# Patient Record
Sex: FEMALE | Race: WHITE | Employment: OTHER
[De-identification: names, ages, dates, MRNs, and addresses within clinical notes are randomized per-mention and may not be internally consistent; named-entity substitution may affect disease eponyms.]

---

## 2017-01-06 RX ORDER — MECLIZINE HCL 12.5 MG/1
TABLET ORAL
Qty: 270 TABLET | Refills: 1 | Status: SHIPPED | OUTPATIENT
Start: 2017-01-06 | End: 2017-10-12 | Stop reason: SDUPTHER

## 2017-01-11 DIAGNOSIS — K57.32 DIVERTICULITIS OF LARGE INTESTINE WITHOUT PERFORATION OR ABSCESS WITHOUT BLEEDING: Primary | ICD-10-CM

## 2017-01-11 DIAGNOSIS — K31.89 MASS OF DUODENUM: ICD-10-CM

## 2017-02-06 PROBLEM — R93.5 ABNORMAL CT OF THE ABDOMEN: Status: ACTIVE | Noted: 2017-02-06

## 2017-02-08 ENCOUNTER — TELEPHONE (OUTPATIENT)
Dept: FAMILY MEDICINE CLINIC | Facility: CLINIC | Age: 77
End: 2017-02-08

## 2017-02-08 DIAGNOSIS — Z12.39 BREAST CANCER SCREENING: ICD-10-CM

## 2017-02-08 DIAGNOSIS — Z92.89 HISTORY OF MAMMOGRAM: ICD-10-CM

## 2017-02-08 DIAGNOSIS — Z12.31 ENCOUNTER FOR SCREENING MAMMOGRAM FOR BREAST CANCER: Primary | ICD-10-CM

## 2017-02-10 ENCOUNTER — TELEPHONE (OUTPATIENT)
Dept: FAMILY MEDICINE CLINIC | Facility: CLINIC | Age: 77
End: 2017-02-10

## 2017-02-10 DIAGNOSIS — Z00.00 ROUTINE ADULT HEALTH MAINTENANCE: Primary | ICD-10-CM

## 2017-02-10 DIAGNOSIS — Z79.4 TYPE 2 DIABETES MELLITUS WITHOUT COMPLICATION, WITH LONG-TERM CURRENT USE OF INSULIN (HCC): ICD-10-CM

## 2017-02-10 DIAGNOSIS — E11.9 TYPE 2 DIABETES MELLITUS WITHOUT COMPLICATION, WITH LONG-TERM CURRENT USE OF INSULIN (HCC): ICD-10-CM

## 2017-02-10 DIAGNOSIS — R73.9 ELEVATED BLOOD SUGAR: ICD-10-CM

## 2017-02-16 ENCOUNTER — TELEPHONE (OUTPATIENT)
Dept: FAMILY MEDICINE CLINIC | Facility: CLINIC | Age: 77
End: 2017-02-16

## 2017-02-16 DIAGNOSIS — Z12.31 ENCOUNTER FOR SCREENING MAMMOGRAM FOR BREAST CANCER: Primary | ICD-10-CM

## 2017-02-20 ENCOUNTER — HOSPITAL ENCOUNTER (OUTPATIENT)
Dept: MAMMOGRAPHY | Age: 77
Discharge: HOME | End: 2017-02-20
Payer: MEDICARE

## 2017-02-20 DIAGNOSIS — Z12.31 ENCOUNTER FOR SCREENING MAMMOGRAM FOR BREAST CANCER: ICD-10-CM

## 2017-02-20 PROCEDURE — 7025F PT INFOSYS ALARM 4 NXT MAMMO: CPT | Performed by: RADIOLOGY

## 2017-02-20 PROCEDURE — G0202 SCR MAMMO BI INCL CAD: HCPCS | Performed by: RADIOLOGY

## 2017-02-20 PROCEDURE — G0202 SCR MAMMO BI INCL CAD: HCPCS

## 2017-03-10 ENCOUNTER — OFFICE VISIT (OUTPATIENT)
Dept: GASTROENTEROLOGY | Facility: CLINIC | Age: 77
End: 2017-03-10

## 2017-03-10 VITALS
OXYGEN SATURATION: 95 % | WEIGHT: 170 LBS | SYSTOLIC BLOOD PRESSURE: 143 MMHG | BODY MASS INDEX: 32.1 KG/M2 | HEART RATE: 93 BPM | DIASTOLIC BLOOD PRESSURE: 59 MMHG | HEIGHT: 61 IN | TEMPERATURE: 97.6 F | RESPIRATION RATE: 14 BRPM

## 2017-03-10 DIAGNOSIS — R93.5 ABNORMAL CT OF THE ABDOMEN: Primary | ICD-10-CM

## 2017-03-10 DIAGNOSIS — K57.92 DIVERTICULITIS OF INTESTINE WITHOUT PERFORATION OR ABSCESS WITHOUT BLEEDING, UNSPECIFIED PART OF INTESTINAL TRACT: ICD-10-CM

## 2017-03-10 DIAGNOSIS — K31.7 POLYP OF DUODENUM: ICD-10-CM

## 2017-03-10 PROCEDURE — 99204 OFFICE O/P NEW MOD 45 MIN: CPT | Performed by: INTERNAL MEDICINE

## 2017-03-10 ASSESSMENT — ENCOUNTER SYMPTOMS
RECTAL PAIN: 0
ALLERGIC/IMMUNOLOGIC NEGATIVE: 1
ANAL BLEEDING: 0
NAUSEA: 0
CONSTIPATION: 0
BLOOD IN STOOL: 0
VOMITING: 0
DIARRHEA: 0
BACK PAIN: 1
SHORTNESS OF BREATH: 1
ABDOMINAL PAIN: 0
EYES NEGATIVE: 1
ABDOMINAL DISTENTION: 0
GASTROINTESTINAL NEGATIVE: 1

## 2017-04-03 ENCOUNTER — HOSPITAL ENCOUNTER (OUTPATIENT)
Age: 77
Setting detail: SPECIMEN
Discharge: HOME | End: 2017-04-03
Payer: MEDICARE

## 2017-04-03 ENCOUNTER — HOSPITAL ENCOUNTER (OUTPATIENT)
Facility: CLINIC | Age: 77
Setting detail: OUTPATIENT SURGERY
Discharge: HOME | End: 2017-04-03
Attending: INTERNAL MEDICINE | Admitting: INTERNAL MEDICINE
Payer: MEDICARE

## 2017-04-03 ENCOUNTER — ANESTHESIA (OUTPATIENT)
Dept: OPERATING ROOM | Facility: CLINIC | Age: 77
End: 2017-04-03
Payer: MEDICARE

## 2017-04-03 ENCOUNTER — ANESTHESIA EVENT (OUTPATIENT)
Dept: OPERATING ROOM | Facility: CLINIC | Age: 77
End: 2017-04-03
Payer: MEDICARE

## 2017-04-03 VITALS
RESPIRATION RATE: 15 BRPM | TEMPERATURE: 97.7 F | WEIGHT: 165 LBS | OXYGEN SATURATION: 94 % | SYSTOLIC BLOOD PRESSURE: 155 MMHG | HEART RATE: 90 BPM | HEIGHT: 61 IN | BODY MASS INDEX: 31.15 KG/M2 | DIASTOLIC BLOOD PRESSURE: 86 MMHG

## 2017-04-03 VITALS
SYSTOLIC BLOOD PRESSURE: 148 MMHG | OXYGEN SATURATION: 97 % | DIASTOLIC BLOOD PRESSURE: 81 MMHG | RESPIRATION RATE: 19 BRPM

## 2017-04-03 LAB
GLUCOSE BLD-MCNC: 154 MG/DL (ref 65–105)
GLUCOSE BLD-MCNC: 156 MG/DL (ref 65–105)

## 2017-04-03 PROCEDURE — 3700000000 HC ANESTHESIA ATTENDED CARE: Performed by: INTERNAL MEDICINE

## 2017-04-03 PROCEDURE — 7100000001 HC PACU RECOVERY - ADDTL 15 MIN: Performed by: INTERNAL MEDICINE

## 2017-04-03 PROCEDURE — 7100000010 HC PHASE II RECOVERY - FIRST 15 MIN: Performed by: INTERNAL MEDICINE

## 2017-04-03 PROCEDURE — 2500000003 HC RX 250 WO HCPCS: Performed by: NURSE ANESTHETIST, CERTIFIED REGISTERED

## 2017-04-03 PROCEDURE — 7100000011 HC PHASE II RECOVERY - ADDTL 15 MIN: Performed by: INTERNAL MEDICINE

## 2017-04-03 PROCEDURE — 82947 ASSAY GLUCOSE BLOOD QUANT: CPT

## 2017-04-03 PROCEDURE — 2580000003 HC RX 258: Performed by: ANESTHESIOLOGY

## 2017-04-03 PROCEDURE — 6360000002 HC RX W HCPCS: Performed by: NURSE ANESTHETIST, CERTIFIED REGISTERED

## 2017-04-03 PROCEDURE — 3609013500 HC EGD REMOVAL TUMOR POLYP/OTHER LESION SNARE TECH: Performed by: INTERNAL MEDICINE

## 2017-04-03 PROCEDURE — 7100000000 HC PACU RECOVERY - FIRST 15 MIN: Performed by: INTERNAL MEDICINE

## 2017-04-03 RX ORDER — ONDANSETRON 2 MG/ML
4 INJECTION INTRAMUSCULAR; INTRAVENOUS
Status: DISCONTINUED | OUTPATIENT
Start: 2017-04-03 | End: 2017-04-03 | Stop reason: HOSPADM

## 2017-04-03 RX ORDER — SODIUM CHLORIDE, SODIUM LACTATE, POTASSIUM CHLORIDE, CALCIUM CHLORIDE 600; 310; 30; 20 MG/100ML; MG/100ML; MG/100ML; MG/100ML
INJECTION, SOLUTION INTRAVENOUS CONTINUOUS
Status: DISCONTINUED | OUTPATIENT
Start: 2017-04-03 | End: 2017-04-03 | Stop reason: HOSPADM

## 2017-04-03 RX ORDER — MIDAZOLAM HYDROCHLORIDE 1 MG/ML
1 INJECTION INTRAMUSCULAR; INTRAVENOUS EVERY 10 MIN PRN
Status: DISCONTINUED | OUTPATIENT
Start: 2017-04-03 | End: 2017-04-03 | Stop reason: HOSPADM

## 2017-04-03 RX ORDER — PROPOFOL 10 MG/ML
INJECTION, EMULSION INTRAVENOUS PRN
Status: DISCONTINUED | OUTPATIENT
Start: 2017-04-03 | End: 2017-04-03 | Stop reason: SDUPTHER

## 2017-04-03 RX ORDER — LIDOCAINE HYDROCHLORIDE 10 MG/ML
INJECTION, SOLUTION EPIDURAL; INFILTRATION; INTRACAUDAL; PERINEURAL PRN
Status: DISCONTINUED | OUTPATIENT
Start: 2017-04-03 | End: 2017-04-03 | Stop reason: SDUPTHER

## 2017-04-03 RX ORDER — MEPERIDINE HYDROCHLORIDE 50 MG/ML
12.5 INJECTION INTRAMUSCULAR; INTRAVENOUS; SUBCUTANEOUS EVERY 5 MIN PRN
Status: DISCONTINUED | OUTPATIENT
Start: 2017-04-03 | End: 2017-04-03 | Stop reason: HOSPADM

## 2017-04-03 RX ORDER — FENTANYL CITRATE 50 UG/ML
50 INJECTION, SOLUTION INTRAMUSCULAR; INTRAVENOUS EVERY 5 MIN PRN
Status: DISCONTINUED | OUTPATIENT
Start: 2017-04-03 | End: 2017-04-03 | Stop reason: HOSPADM

## 2017-04-03 RX ORDER — FENTANYL CITRATE 50 UG/ML
25 INJECTION, SOLUTION INTRAMUSCULAR; INTRAVENOUS EVERY 5 MIN PRN
Status: DISCONTINUED | OUTPATIENT
Start: 2017-04-03 | End: 2017-04-03 | Stop reason: HOSPADM

## 2017-04-03 RX ADMIN — PROPOFOL 10 MG: 10 INJECTION, EMULSION INTRAVENOUS at 09:28

## 2017-04-03 RX ADMIN — SODIUM CHLORIDE, POTASSIUM CHLORIDE, SODIUM LACTATE AND CALCIUM CHLORIDE: 600; 310; 30; 20 INJECTION, SOLUTION INTRAVENOUS at 08:45

## 2017-04-03 RX ADMIN — LIDOCAINE HYDROCHLORIDE 30 MG: 10 INJECTION, SOLUTION EPIDURAL; INFILTRATION; INTRACAUDAL; PERINEURAL at 09:24

## 2017-04-03 RX ADMIN — PROPOFOL 10 MG: 10 INJECTION, EMULSION INTRAVENOUS at 09:26

## 2017-04-03 RX ADMIN — PROPOFOL 10 MG: 10 INJECTION, EMULSION INTRAVENOUS at 09:27

## 2017-04-03 RX ADMIN — PROPOFOL 20 MG: 10 INJECTION, EMULSION INTRAVENOUS at 09:25

## 2017-04-03 RX ADMIN — PROPOFOL 100 MG: 10 INJECTION, EMULSION INTRAVENOUS at 09:24

## 2017-04-03 ASSESSMENT — PAIN - FUNCTIONAL ASSESSMENT: PAIN_FUNCTIONAL_ASSESSMENT: 0-10

## 2017-04-05 LAB — SURGICAL PATHOLOGY REPORT: NORMAL

## 2017-04-17 RX ORDER — LISINOPRIL 10 MG/1
TABLET ORAL
Qty: 90 TABLET | Refills: 3 | Status: SHIPPED | OUTPATIENT
Start: 2017-04-17 | End: 2018-03-23 | Stop reason: SDUPTHER

## 2017-04-17 RX ORDER — GLIMEPIRIDE 2 MG/1
TABLET ORAL
Qty: 90 TABLET | Refills: 3 | Status: SHIPPED | OUTPATIENT
Start: 2017-04-17 | End: 2018-03-23 | Stop reason: SDUPTHER

## 2017-04-17 RX ORDER — SYRINGE AND NEEDLE,INSULIN,1ML 30 GX5/16"
SYRINGE, EMPTY DISPOSABLE MISCELLANEOUS
Qty: 100 EACH | Refills: 1 | Status: SHIPPED | OUTPATIENT
Start: 2017-04-17 | End: 2017-07-10 | Stop reason: SDUPTHER

## 2017-04-18 PROBLEM — D13.2 DUODENAL ADENOMA: Status: ACTIVE | Noted: 2017-04-01

## 2017-05-01 ENCOUNTER — OFFICE VISIT (OUTPATIENT)
Dept: GASTROENTEROLOGY | Age: 77
End: 2017-05-01
Payer: MEDICARE

## 2017-05-01 VITALS
OXYGEN SATURATION: 96 % | TEMPERATURE: 98.1 F | BODY MASS INDEX: 32.85 KG/M2 | HEART RATE: 96 BPM | HEIGHT: 61 IN | SYSTOLIC BLOOD PRESSURE: 159 MMHG | WEIGHT: 174 LBS | DIASTOLIC BLOOD PRESSURE: 67 MMHG

## 2017-05-01 DIAGNOSIS — R93.5 ABNORMAL CT OF THE ABDOMEN: ICD-10-CM

## 2017-05-01 DIAGNOSIS — K31.89 DUODENAL MASS: Primary | ICD-10-CM

## 2017-05-01 PROCEDURE — 99214 OFFICE O/P EST MOD 30 MIN: CPT | Performed by: INTERNAL MEDICINE

## 2017-05-01 ASSESSMENT — ENCOUNTER SYMPTOMS
ABDOMINAL DISTENTION: 0
ABDOMINAL PAIN: 0
NAUSEA: 0
BLOOD IN STOOL: 0
DIARRHEA: 0
ANAL BLEEDING: 0
BACK PAIN: 1
GASTROINTESTINAL NEGATIVE: 1
ALLERGIC/IMMUNOLOGIC NEGATIVE: 1
RECTAL PAIN: 0
SHORTNESS OF BREATH: 1
CONSTIPATION: 0
EYES NEGATIVE: 1
VOMITING: 0

## 2017-05-03 ENCOUNTER — TELEPHONE (OUTPATIENT)
Dept: GASTROENTEROLOGY | Age: 77
End: 2017-05-03

## 2017-05-12 ENCOUNTER — CARE COORDINATION (OUTPATIENT)
Dept: CARE COORDINATION | Age: 77
End: 2017-05-12

## 2017-06-05 ENCOUNTER — TELEPHONE (OUTPATIENT)
Dept: FAMILY MEDICINE CLINIC | Age: 77
End: 2017-06-05

## 2017-06-05 DIAGNOSIS — Z79.4 TYPE 2 DIABETES MELLITUS WITH DIABETIC POLYNEUROPATHY, WITH LONG-TERM CURRENT USE OF INSULIN (HCC): ICD-10-CM

## 2017-06-05 DIAGNOSIS — E11.42 TYPE 2 DIABETES MELLITUS WITH DIABETIC POLYNEUROPATHY, WITH LONG-TERM CURRENT USE OF INSULIN (HCC): ICD-10-CM

## 2017-06-05 DIAGNOSIS — I10 ESSENTIAL HYPERTENSION: Primary | ICD-10-CM

## 2017-06-05 DIAGNOSIS — E78.5 DYSLIPIDEMIA: ICD-10-CM

## 2017-06-14 ENCOUNTER — HOSPITAL ENCOUNTER (OUTPATIENT)
Age: 77
Setting detail: SPECIMEN
Discharge: HOME | End: 2017-06-14
Payer: MEDICARE

## 2017-06-14 DIAGNOSIS — E11.42 TYPE 2 DIABETES MELLITUS WITH DIABETIC POLYNEUROPATHY, WITH LONG-TERM CURRENT USE OF INSULIN (HCC): ICD-10-CM

## 2017-06-14 DIAGNOSIS — I10 ESSENTIAL HYPERTENSION: ICD-10-CM

## 2017-06-14 DIAGNOSIS — Z79.4 TYPE 2 DIABETES MELLITUS WITH DIABETIC POLYNEUROPATHY, WITH LONG-TERM CURRENT USE OF INSULIN (HCC): ICD-10-CM

## 2017-06-14 DIAGNOSIS — E78.5 DYSLIPIDEMIA: ICD-10-CM

## 2017-06-14 LAB
ABSOLUTE EOS #: 0.2 K/UL (ref 0–0.4)
ABSOLUTE LYMPH #: 3.7 K/UL (ref 1–4.8)
ABSOLUTE MONO #: 0.9 K/UL (ref 0.1–1.2)
ALBUMIN SERPL-MCNC: 4.1 G/DL (ref 3.5–5.2)
ALBUMIN/GLOBULIN RATIO: 1.2 (ref 1–2.5)
ALP BLD-CCNC: 85 U/L (ref 35–104)
ALT SERPL-CCNC: 20 U/L (ref 5–33)
ANION GAP SERPL CALCULATED.3IONS-SCNC: 18 MMOL/L (ref 9–17)
AST SERPL-CCNC: 21 U/L
BASOPHILS # BLD: 0 %
BASOPHILS ABSOLUTE: 0 K/UL (ref 0–0.2)
BILIRUB SERPL-MCNC: 0.54 MG/DL (ref 0.3–1.2)
BUN BLDV-MCNC: 15 MG/DL (ref 8–23)
BUN/CREAT BLD: ABNORMAL (ref 9–20)
CALCIUM SERPL-MCNC: 9.6 MG/DL (ref 8.6–10.4)
CHLORIDE BLD-SCNC: 100 MMOL/L (ref 98–107)
CHOLESTEROL/HDL RATIO: 2.6
CHOLESTEROL: 208 MG/DL
CO2: 24 MMOL/L (ref 20–31)
CREAT SERPL-MCNC: 0.66 MG/DL (ref 0.5–0.9)
DIFFERENTIAL TYPE: NORMAL
EOSINOPHILS RELATIVE PERCENT: 2 %
ESTIMATED AVERAGE GLUCOSE: 220 MG/DL
GFR AFRICAN AMERICAN: >60 ML/MIN
GFR NON-AFRICAN AMERICAN: >60 ML/MIN
GFR SERPL CREATININE-BSD FRML MDRD: ABNORMAL ML/MIN/{1.73_M2}
GFR SERPL CREATININE-BSD FRML MDRD: ABNORMAL ML/MIN/{1.73_M2}
GLUCOSE BLD-MCNC: 108 MG/DL (ref 70–99)
HBA1C MFR BLD: 9.3 % (ref 4–6)
HCT VFR BLD CALC: 42.6 % (ref 36–46)
HDLC SERPL-MCNC: 81 MG/DL
HEMOGLOBIN: 13.7 G/DL (ref 12–16)
LDL CHOLESTEROL: 107 MG/DL (ref 0–130)
LYMPHOCYTES # BLD: 38 %
MCH RBC QN AUTO: 28.7 PG (ref 26–34)
MCHC RBC AUTO-ENTMCNC: 32.2 G/DL (ref 31–37)
MCV RBC AUTO: 89 FL (ref 80–100)
MONOCYTES # BLD: 9 %
PDW BLD-RTO: 15 % (ref 12.5–15.4)
PLATELET # BLD: 298 K/UL (ref 140–450)
PLATELET ESTIMATE: NORMAL
PMV BLD AUTO: 8.9 FL (ref 6–12)
POTASSIUM SERPL-SCNC: 4.4 MMOL/L (ref 3.7–5.3)
RBC # BLD: 4.79 M/UL (ref 4–5.2)
RBC # BLD: NORMAL 10*6/UL
SEG NEUTROPHILS: 51 %
SEGMENTED NEUTROPHILS ABSOLUTE COUNT: 5.1 K/UL (ref 1.8–7.7)
SODIUM BLD-SCNC: 142 MMOL/L (ref 135–144)
TOTAL PROTEIN: 7.5 G/DL (ref 6.4–8.3)
TRIGL SERPL-MCNC: 98 MG/DL
VLDLC SERPL CALC-MCNC: ABNORMAL MG/DL (ref 1–30)
WBC # BLD: 9.9 K/UL (ref 3.5–11)
WBC # BLD: NORMAL 10*3/UL

## 2017-06-16 ENCOUNTER — OFFICE VISIT (OUTPATIENT)
Dept: FAMILY MEDICINE CLINIC | Age: 77
End: 2017-06-16
Payer: MEDICARE

## 2017-06-16 VITALS
HEART RATE: 96 BPM | HEIGHT: 61 IN | WEIGHT: 170 LBS | BODY MASS INDEX: 32.1 KG/M2 | SYSTOLIC BLOOD PRESSURE: 120 MMHG | DIASTOLIC BLOOD PRESSURE: 64 MMHG

## 2017-06-16 DIAGNOSIS — D13.2 DUODENAL ADENOMA: Primary | ICD-10-CM

## 2017-06-16 DIAGNOSIS — E11.42 DIABETIC POLYNEUROPATHY ASSOCIATED WITH TYPE 2 DIABETES MELLITUS (HCC): ICD-10-CM

## 2017-06-16 DIAGNOSIS — E11.42 TYPE 2 DIABETES MELLITUS WITH DIABETIC POLYNEUROPATHY, WITH LONG-TERM CURRENT USE OF INSULIN (HCC): ICD-10-CM

## 2017-06-16 DIAGNOSIS — E78.5 DYSLIPIDEMIA: ICD-10-CM

## 2017-06-16 DIAGNOSIS — Z79.4 TYPE 2 DIABETES MELLITUS WITH DIABETIC POLYNEUROPATHY, WITH LONG-TERM CURRENT USE OF INSULIN (HCC): ICD-10-CM

## 2017-06-16 DIAGNOSIS — G89.29 CHRONIC BILATERAL LOW BACK PAIN WITHOUT SCIATICA: ICD-10-CM

## 2017-06-16 DIAGNOSIS — I10 ESSENTIAL HYPERTENSION: ICD-10-CM

## 2017-06-16 DIAGNOSIS — J44.9 CHRONIC OBSTRUCTIVE PULMONARY DISEASE, UNSPECIFIED COPD TYPE (HCC): ICD-10-CM

## 2017-06-16 DIAGNOSIS — L82.1 SK (SEBORRHEIC KERATOSIS): ICD-10-CM

## 2017-06-16 DIAGNOSIS — M54.50 CHRONIC BILATERAL LOW BACK PAIN WITHOUT SCIATICA: ICD-10-CM

## 2017-06-16 PROCEDURE — G8510 SCR DEP NEG, NO PLAN REQD: HCPCS | Performed by: FAMILY MEDICINE

## 2017-06-16 PROCEDURE — 3288F FALL RISK ASSESSMENT DOCD: CPT | Performed by: FAMILY MEDICINE

## 2017-06-16 PROCEDURE — 99213 OFFICE O/P EST LOW 20 MIN: CPT | Performed by: FAMILY MEDICINE

## 2017-06-16 RX ORDER — OXYCODONE HYDROCHLORIDE AND ACETAMINOPHEN 5; 325 MG/1; MG/1
1 TABLET ORAL EVERY 4 HOURS PRN
Qty: 100 TABLET | Refills: 0 | Status: SHIPPED | OUTPATIENT
Start: 2017-06-16 | End: 2017-06-23

## 2017-06-16 RX ORDER — GABAPENTIN 100 MG/1
100 CAPSULE ORAL 3 TIMES DAILY
Qty: 90 CAPSULE | Refills: 3 | Status: SHIPPED | OUTPATIENT
Start: 2017-06-16 | End: 2017-08-24

## 2017-06-16 ASSESSMENT — PATIENT HEALTH QUESTIONNAIRE - PHQ9
1. LITTLE INTEREST OR PLEASURE IN DOING THINGS: 0
SUM OF ALL RESPONSES TO PHQ QUESTIONS 1-9: 3
2. FEELING DOWN, DEPRESSED OR HOPELESS: 3
SUM OF ALL RESPONSES TO PHQ9 QUESTIONS 1 & 2: 3

## 2017-06-16 ASSESSMENT — ENCOUNTER SYMPTOMS
SORE THROAT: 0
NAUSEA: 0
SHORTNESS OF BREATH: 0
BACK PAIN: 1
VOMITING: 0
COUGH: 0
DIARRHEA: 0
SINUS PRESSURE: 0

## 2017-06-23 ENCOUNTER — TELEPHONE (OUTPATIENT)
Dept: FAMILY MEDICINE CLINIC | Age: 77
End: 2017-06-23

## 2017-06-23 RX ORDER — AZELASTINE 1 MG/ML
1 SPRAY, METERED NASAL 2 TIMES DAILY
Qty: 1 BOTTLE | Refills: 0 | Status: SHIPPED | OUTPATIENT
Start: 2017-06-23 | End: 2017-07-18 | Stop reason: ALTCHOICE

## 2017-06-23 RX ORDER — AMOXICILLIN AND CLAVULANATE POTASSIUM 875; 125 MG/1; MG/1
1 TABLET, FILM COATED ORAL 2 TIMES DAILY
Qty: 20 TABLET | Refills: 0 | Status: SHIPPED | OUTPATIENT
Start: 2017-06-23 | End: 2017-07-03

## 2017-07-11 RX ORDER — SYRINGE AND NEEDLE,INSULIN,1ML 30 GX5/16"
SYRINGE, EMPTY DISPOSABLE MISCELLANEOUS
Qty: 100 EACH | Refills: 0 | Status: SHIPPED | OUTPATIENT
Start: 2017-07-11 | End: 2017-10-12 | Stop reason: SDUPTHER

## 2017-07-13 ENCOUNTER — OFFICE VISIT (OUTPATIENT)
Dept: FAMILY MEDICINE CLINIC | Age: 77
End: 2017-07-13
Payer: MEDICARE

## 2017-07-13 VITALS — SYSTOLIC BLOOD PRESSURE: 120 MMHG | DIASTOLIC BLOOD PRESSURE: 72 MMHG | HEART RATE: 72 BPM

## 2017-07-13 DIAGNOSIS — M17.11 PRIMARY OSTEOARTHRITIS OF RIGHT KNEE: Primary | ICD-10-CM

## 2017-07-13 PROCEDURE — 99213 OFFICE O/P EST LOW 20 MIN: CPT

## 2017-07-13 RX ORDER — TRAMADOL HYDROCHLORIDE 50 MG/1
50 TABLET ORAL EVERY 6 HOURS PRN
Qty: 30 TABLET | Refills: 0 | Status: SHIPPED | OUTPATIENT
Start: 2017-07-13 | End: 2017-07-23

## 2017-07-14 DIAGNOSIS — M17.11 PRIMARY OSTEOARTHRITIS OF RIGHT KNEE: ICD-10-CM

## 2017-07-18 ENCOUNTER — OFFICE VISIT (OUTPATIENT)
Dept: FAMILY MEDICINE CLINIC | Age: 77
End: 2017-07-18
Payer: MEDICARE

## 2017-07-18 VITALS
WEIGHT: 171 LBS | HEART RATE: 88 BPM | DIASTOLIC BLOOD PRESSURE: 74 MMHG | BODY MASS INDEX: 32.31 KG/M2 | OXYGEN SATURATION: 89 % | SYSTOLIC BLOOD PRESSURE: 134 MMHG

## 2017-07-18 DIAGNOSIS — M17.11 PRIMARY OSTEOARTHRITIS OF RIGHT KNEE: Primary | ICD-10-CM

## 2017-07-18 PROCEDURE — 99213 OFFICE O/P EST LOW 20 MIN: CPT | Performed by: FAMILY MEDICINE

## 2017-07-18 RX ORDER — OXYCODONE HYDROCHLORIDE AND ACETAMINOPHEN 5; 325 MG/1; MG/1
1 TABLET ORAL EVERY 4 HOURS PRN
COMMUNITY
End: 2017-12-18 | Stop reason: ALTCHOICE

## 2017-07-18 ASSESSMENT — ENCOUNTER SYMPTOMS
BACK PAIN: 0
COUGH: 0
SINUS PRESSURE: 0
DIARRHEA: 0
SHORTNESS OF BREATH: 0
VOMITING: 0
NAUSEA: 0
SORE THROAT: 0

## 2017-08-22 ENCOUNTER — TELEPHONE (OUTPATIENT)
Dept: FAMILY MEDICINE CLINIC | Age: 77
End: 2017-08-22

## 2017-08-24 ENCOUNTER — OFFICE VISIT (OUTPATIENT)
Dept: FAMILY MEDICINE CLINIC | Age: 77
End: 2017-08-24
Payer: MEDICARE

## 2017-08-24 VITALS
WEIGHT: 171 LBS | DIASTOLIC BLOOD PRESSURE: 74 MMHG | HEART RATE: 94 BPM | BODY MASS INDEX: 32.31 KG/M2 | SYSTOLIC BLOOD PRESSURE: 130 MMHG

## 2017-08-24 DIAGNOSIS — M17.11 PRIMARY OSTEOARTHRITIS OF RIGHT KNEE: Primary | ICD-10-CM

## 2017-08-24 PROCEDURE — 96372 THER/PROPH/DIAG INJ SC/IM: CPT | Performed by: FAMILY MEDICINE

## 2017-08-24 PROCEDURE — 99213 OFFICE O/P EST LOW 20 MIN: CPT | Performed by: FAMILY MEDICINE

## 2017-08-24 RX ORDER — METHYLPREDNISOLONE ACETATE 80 MG/ML
80 INJECTION, SUSPENSION INTRA-ARTICULAR; INTRALESIONAL; INTRAMUSCULAR; SOFT TISSUE ONCE
Status: COMPLETED | OUTPATIENT
Start: 2017-08-24 | End: 2017-08-24

## 2017-08-24 RX ADMIN — METHYLPREDNISOLONE ACETATE 80 MG: 80 INJECTION, SUSPENSION INTRA-ARTICULAR; INTRALESIONAL; INTRAMUSCULAR; SOFT TISSUE at 14:22

## 2017-08-24 ASSESSMENT — ENCOUNTER SYMPTOMS
VOMITING: 0
BACK PAIN: 0
COUGH: 0
SHORTNESS OF BREATH: 0
NAUSEA: 0
SORE THROAT: 0
SINUS PRESSURE: 0
DIARRHEA: 0

## 2017-08-28 ENCOUNTER — TELEPHONE (OUTPATIENT)
Dept: FAMILY MEDICINE CLINIC | Age: 77
End: 2017-08-28

## 2017-08-28 DIAGNOSIS — M25.561 RIGHT KNEE PAIN, UNSPECIFIED CHRONICITY: Primary | ICD-10-CM

## 2017-09-01 ENCOUNTER — HOSPITAL ENCOUNTER (OUTPATIENT)
Dept: MRI IMAGING | Age: 77
Discharge: HOME | End: 2017-09-01
Payer: MEDICARE

## 2017-09-01 DIAGNOSIS — M25.561 RIGHT KNEE PAIN, UNSPECIFIED CHRONICITY: ICD-10-CM

## 2017-09-01 PROCEDURE — 73721 MRI JNT OF LWR EXTRE W/O DYE: CPT

## 2017-09-06 DIAGNOSIS — M25.561 RIGHT KNEE PAIN, UNSPECIFIED CHRONICITY: Primary | ICD-10-CM

## 2017-09-06 DIAGNOSIS — S83.8X1A MENISCAL INJURY, RIGHT, INITIAL ENCOUNTER: ICD-10-CM

## 2017-10-03 ENCOUNTER — OFFICE VISIT (OUTPATIENT)
Dept: ORTHOPEDIC SURGERY | Age: 77
End: 2017-10-03
Payer: MEDICARE

## 2017-10-03 VITALS — BODY MASS INDEX: 31.1 KG/M2 | WEIGHT: 169 LBS | HEIGHT: 62 IN

## 2017-10-03 DIAGNOSIS — M87.251: Primary | ICD-10-CM

## 2017-10-03 PROCEDURE — 99204 OFFICE O/P NEW MOD 45 MIN: CPT | Performed by: ORTHOPAEDIC SURGERY

## 2017-10-03 RX ORDER — HYDROCODONE BITARTRATE AND ACETAMINOPHEN 5; 325 MG/1; MG/1
1 TABLET ORAL EVERY 8 HOURS PRN
Qty: 50 TABLET | Refills: 0 | Status: SHIPPED | OUTPATIENT
Start: 2017-10-03 | End: 2017-12-18 | Stop reason: ALTCHOICE

## 2017-10-03 NOTE — MR AVS SNAPSHOT
your BMI, the greater your risk of heart disease, high blood pressure, type 2 diabetes, stroke, gallstones, arthritis, sleep apnea, and certain cancers. BMI is not perfect. It may overestimate body fat in athletes and people who are more muscular. Even a small weight loss (between 5 and 10 percent of your current weight) by decreasing your calorie intake and becoming more physically active will help lower your risk of developing or worsening diseases associated with obesity. Learn more at: [email or web address]             Today's Medication Changes          These changes are accurate as of: 10/3/17 12:00 PM.  If you have any questions, ask your nurse or doctor. START taking these medications           HYDROcodone-acetaminophen 5-325 MG per tablet   Commonly known as:  NORCO   Instructions: Take 1 tablet by mouth every 8 hours as needed for Pain . Quantity:  50 tablet   Refills:  0   Started by:  Franklin Bocanegra MD            Where to Get Your Medications      These medications were sent to [address] [place] [address] 16, [address] [facility] Syeda Drum [phone]  [facility]      Phone:  [phone]     HYDROcodone-acetaminophen 5-325 MG per tablet               Your Current Medications Are              HYDROcodone-acetaminophen (NORCO) 5-325 MG per tablet Take 1 tablet by mouth every 8 hours as needed for Pain . oxyCODONE-acetaminophen (PERCOCET) 5-325 MG per tablet Take 1 tablet by mouth every 4 hours as needed for Pain . MONOJECT INS SYR 1CC/30G 30G X 5/16\" 1 ML MISC USE  TO INJECT TWICE DAILY    insulin 70-30 (NOVOLIN 70/30) (70-30) 100 UNIT per ML injection vial 80 units in am before breakfast and 50 units before evening meal daily    lisinopril (PRINIVIL;ZESTRIL) 10 MG tablet TAKE 1 TABLET EVERY DAY    glimepiride (AMARYL) 2 MG tablet TAKE 1 TABLET EVERY MORNING (BEFORE BREAKFAST). meclizine (ANTIVERT) 12.5 MG tablet TAKE 1 TABLET THREE TIMES DAILY AS NEEDED    aspirin 325 MG tablet Take 325 mg by mouth daily. ezetimibe-simvastatin (VYTORIN) 10-20 MG per tablet Take 1 tablet by mouth nightly. naproxen sodium (ALEVE) 220 MG tablet Take 220 mg by mouth 2 times daily as needed. Allergies              Nubain [Nalbuphine Hcl]     Vistaril [Hydroxyzine Hcl] Nausea And Vomiting         Additional Information        Basic Information     Date Of Birth Sex Race Ethnicity Preferred Language    1940 Female White Non-/Non  English      Problem List as of 10/3/2017  Date Reviewed: 7/18/2017                Duodenal adenoma    Diverticulitis of intestine without perforation or abscess without bleeding    Abnormal CT of the abdomen    Type 2 diabetes mellitus with diabetic polyneuropathy (HCC)    COPD (chronic obstructive pulmonary disease) ([facility] [address]    Hypertension    Dyslipidemia    Osteoporosis    Osteoarthritis      Your Goals as of 10/3/2017 at 12:00 PM              6/14/17    10/4/16    3/28/16       Lifestyle    Affordable medications (pt-stated)              Result Component    HEMOGLOBIN A1C < 8.0   9.3  10.5  8.8      Immunizations as of 10/3/2017     Name Date    Influenza Virus Vaccine 10/27/2015, 10/22/2014, 10/22/2013, 10/15/2012    Influenza, Inoa Pettit, 3 Years and older, IM 10/5/2016    Pneumococcal 13-valent Conjugate (Veena Leonel) 10/27/2015      Preventive Care        Date Due    Tetanus Combination Vaccine (1 - Tdap) 4/21/1960    Zoster Vaccine 4/21/2001    Osteoporosis screening or a bone density scan (Dexa) is recommended once at age 72. Based upon the results and risk factors for bone loss, your provider will recommend whether this needs to be repeated.  4/21/2006    Pneumococcal Vaccines (two) for all adults aged 72 and over (2 of 2 - PPSV23) 10/27/2016    Yearly Flu Vaccine (1) 9/1/2017    Cholesterol Screening 6/14/2022            MyChart Signup Our records indicate that you have declined MyChart signup.

## 2017-10-03 NOTE — PROGRESS NOTES
This pleasant 52-year-old patient is seen here because of pain in the right knee which came on suddenly about 2-1/2 months ago. She woke up with pain. She had an x-ray carried out and then an MRI. The MRI suggested a possible fracture and therefore she was referred here. In the past she has tried 1 Tycron with some improvement. She is also still been on pain medication. She has had tramadol. However she says that the pain is definitely improved since this started. Initially she could not even walk on it. She is now able to ambulate weightbearing. The patient says that the pain has always been on the medial side. There is no history of instability or locking episodes. She denies any numbness or tingling. The patient does have a past medical history of osteoporosis, osteoarthritis, diabetes, spinal stenosis and a stress fracture in the left foot in the past.    The patient is also looking after her  who had a major stroke 14 years ago and he is hardly able to communicate. .  This makes it difficult for her to care for him with his knee problem. Examination: Her gait is still slightly antalgic. In standing position there is no malalignment. Her hip examination showed excellent painless motion. Knee examination shows definite effusion. There is tenderness over the medial femoral condyle and medial joint space. She has excellent range of motion and no flexion contracture. There is no instability. X-rays: She had plain films carried out at [address] which had shown a primary osteoarthritis particularly affecting the patellofemoral and medial compartments. The only have the report in the chart from that. 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 Treatment: I have given her a prescription for pain medication. She says that she did have a corticosteroid injection given by Dr. Jonathan Gomez but this did not help her. Because of the avascular necrosis and separation of the fragment I decided against her trying another corticosteroid injection particularly since he is spontaneously improving. I given her further pain medication and will see her again in 2 weeks' time. Next visit should have standing AP of both the knees and supine lateral and the sunrise view of the right knee.

## 2017-10-12 RX ORDER — SYRINGE AND NEEDLE,INSULIN,1ML 30 GX5/16"
SYRINGE, EMPTY DISPOSABLE MISCELLANEOUS
Qty: 100 EACH | Refills: 5 | Status: SHIPPED | OUTPATIENT
Start: 2017-10-12 | End: 2018-08-10 | Stop reason: SDUPTHER

## 2017-10-12 RX ORDER — MECLIZINE HCL 12.5 MG/1
TABLET ORAL
Qty: 270 TABLET | Refills: 1 | Status: SHIPPED | OUTPATIENT
Start: 2017-10-12 | End: 2018-03-07 | Stop reason: SDUPTHER

## 2017-10-18 RX ORDER — ATORVASTATIN CALCIUM 10 MG/1
TABLET, FILM COATED ORAL
Qty: 90 TABLET | Refills: 3 | Status: SHIPPED | OUTPATIENT
Start: 2017-10-18 | End: 2018-02-01 | Stop reason: SDUPTHER

## 2017-10-30 ENCOUNTER — HOSPITAL ENCOUNTER (OUTPATIENT)
Dept: GENERAL RADIOLOGY | Facility: CLINIC | Age: 77
Discharge: HOME | End: 2017-10-30
Payer: MEDICARE

## 2017-10-30 ENCOUNTER — HOSPITAL ENCOUNTER (OUTPATIENT)
Facility: CLINIC | Age: 77
Discharge: HOME | End: 2017-10-30
Payer: MEDICARE

## 2017-10-30 DIAGNOSIS — M87.051 AVASCULAR NECROSIS OF MEDIAL CONDYLE OF RIGHT FEMUR (HCC): Primary | ICD-10-CM

## 2017-10-30 DIAGNOSIS — M87.051 AVASCULAR NECROSIS OF MEDIAL CONDYLE OF RIGHT FEMUR (HCC): ICD-10-CM

## 2017-10-30 PROCEDURE — 73560 X-RAY EXAM OF KNEE 1 OR 2: CPT

## 2017-10-30 PROCEDURE — 73565 X-RAY EXAM OF KNEES: CPT

## 2017-10-31 ENCOUNTER — OFFICE VISIT (OUTPATIENT)
Dept: ORTHOPEDIC SURGERY | Age: 77
End: 2017-10-31
Payer: MEDICARE

## 2017-10-31 VITALS — WEIGHT: 169.09 LBS | HEIGHT: 62 IN | BODY MASS INDEX: 31.12 KG/M2

## 2017-10-31 DIAGNOSIS — M87.051 AVASCULAR NECROSIS OF MEDIAL CONDYLE OF RIGHT FEMUR (HCC): Primary | ICD-10-CM

## 2017-10-31 PROCEDURE — 1036F TOBACCO NON-USER: CPT | Performed by: ORTHOPAEDIC SURGERY

## 2017-10-31 PROCEDURE — 99213 OFFICE O/P EST LOW 20 MIN: CPT | Performed by: ORTHOPAEDIC SURGERY

## 2017-10-31 PROCEDURE — G8417 CALC BMI ABV UP PARAM F/U: HCPCS | Performed by: ORTHOPAEDIC SURGERY

## 2017-10-31 PROCEDURE — 1090F PRES/ABSN URINE INCON ASSESS: CPT | Performed by: ORTHOPAEDIC SURGERY

## 2017-10-31 PROCEDURE — G8427 DOCREV CUR MEDS BY ELIG CLIN: HCPCS | Performed by: ORTHOPAEDIC SURGERY

## 2017-10-31 PROCEDURE — 4040F PNEUMOC VAC/ADMIN/RCVD: CPT | Performed by: ORTHOPAEDIC SURGERY

## 2017-10-31 PROCEDURE — G8400 PT W/DXA NO RESULTS DOC: HCPCS | Performed by: ORTHOPAEDIC SURGERY

## 2017-10-31 PROCEDURE — G8484 FLU IMMUNIZE NO ADMIN: HCPCS | Performed by: ORTHOPAEDIC SURGERY

## 2017-10-31 PROCEDURE — 1123F ACP DISCUSS/DSCN MKR DOCD: CPT | Performed by: ORTHOPAEDIC SURGERY

## 2017-10-31 RX ORDER — OXYCODONE AND ACETAMINOPHEN 7.5; 325 MG/1; MG/1
1 TABLET ORAL NIGHTLY PRN
Qty: 60 TABLET | Refills: 0 | Status: SHIPPED | OUTPATIENT
Start: 2017-10-31 | End: 2017-12-18 | Stop reason: ALTCHOICE

## 2017-11-29 ENCOUNTER — TELEPHONE (OUTPATIENT)
Dept: FAMILY MEDICINE CLINIC | Age: 77
End: 2017-11-29

## 2017-11-30 NOTE — TELEPHONE ENCOUNTER
Spoke with pharmacist. Pharmacist says that Relion 70/30 is over the counter and the same as Novelin 70/30. Relion is [facility] brand and it cost significantly less and no prescription needed. Discussed with Dr. Beto Lopez and he says have patient purchase this and to please bring in prescription so that he can verify that this is accurate.  Left voicemail for patient to return phone call. :)

## 2017-12-01 NOTE — TELEPHONE ENCOUNTER
Health Maintenance   Topic Date Due    DTaP/Tdap/Td vaccine (1 - Tdap) 04/21/1960    Zostavax vaccine  04/21/2001    DEXA (modify frequency per FRAX score)  04/21/2006    Pneumococcal low/med risk (2 of 2 - PPSV23) 10/27/2016    Flu vaccine (1) 09/01/2017    Lipid screen  06/14/2022       Hemoglobin A1C (%)   Date Value   06/14/2017 9.3 (H)   10/04/2016 10.5 (H)   03/28/2016 8.8 (H)             ( goal A1C is < 7)   No results found for: LABMICR  LDL Cholesterol (mg/dL)   Date Value   06/14/2017 107     LDL Calculated (mg/dL)   Date Value   10/04/2016 71       (goal LDL is <100)   AST (U/L)   Date Value   06/14/2017 21     ALT (U/L)   Date Value   06/14/2017 20     BUN (mg/dL)   Date Value   06/14/2017 15     BP Readings from Last 3 Encounters:   08/24/17 130/74   07/18/17 134/74   07/13/17 120/72          (goal 120/80)    All Future Testing planned in [facility]  Lab Frequency Next Occurrence   RUMA Diagnostic Bilateral Once 12/19/2017   CBC Auto Differential Once 12/14/2017   Comprehensive Metabolic Panel Once 02/88/2019   Lipid Panel Once 12/14/2017   Hemoglobin A1C Once 12/14/2017       Next Visit Date:  Future Appointments  Date Time Provider Jessica Ashraf   12[facility]18/2017 11:00 AM [facility]            Patient Active Problem List:     COPD (chronic obstructive pulmonary disease) ([address]     Hypertension     Dyslipidemia     Osteoporosis     Osteoarthritis     Type 2 diabetes mellitus with diabetic polyneuropathy (HCC)     Abnormal CT of the abdomen     Diverticulitis of intestine without perforation or abscess without bleeding     Duodenal adenoma

## 2017-12-13 LAB
ABSOLUTE BASO #: 0.1 K/UL (ref 0–0.1)
ABSOLUTE EOS #: 0.1 K/UL (ref 0.1–0.4)
ABSOLUTE LYMPH #: 3.9 K/UL (ref 0.8–5.2)
ABSOLUTE MONO #: 0.8 K/UL (ref 0.1–0.9)
ABSOLUTE NEUT #: 5.1 K/UL (ref 1.3–9.1)
ALBUMIN SERPL-MCNC: 4.1 G/DL (ref 3.2–5.3)
ALBUMIN SERPL-MCNC: NORMAL G/DL
ALK PHOSPHATASE: 78 IU/L (ref 35–121)
ALP BLD-CCNC: NORMAL U/L
ALT SERPL-CCNC: 19 IU/L (ref 5–59)
ALT SERPL-CCNC: NORMAL U/L
ANION GAP SERPL CALCULATED.3IONS-SCNC: 10 MMOL/L
ANION GAP SERPL CALCULATED.3IONS-SCNC: NORMAL MMOL/L
AST SERPL-CCNC: 20 IU/L (ref 10–42)
AST SERPL-CCNC: NORMAL U/L
AVERAGE GLUCOSE: NORMAL
BASOPHILS ABSOLUTE: NORMAL /ΜL
BASOPHILS RELATIVE PERCENT: 0.7 %
BASOPHILS RELATIVE PERCENT: NORMAL %
BILIRUB SERPL-MCNC: 0.4 MG/DL (ref 0.2–1.3)
BILIRUB SERPL-MCNC: NORMAL MG/DL (ref 0.1–1.4)
BUN BLDV-MCNC: 13 MG/DL (ref 9–24)
BUN BLDV-MCNC: NORMAL MG/DL
CALCIUM SERPL-MCNC: 9.7 MG/DL (ref 8.7–10.8)
CALCIUM SERPL-MCNC: NORMAL MG/DL
CHLORIDE BLD-SCNC: 104 MMOL/L (ref 95–111)
CHLORIDE BLD-SCNC: NORMAL MMOL/L
CHOLESTEROL, TOTAL: 176 MG/DL
CHOLESTEROL/HDL RATIO: 2.3
CHOLESTEROL/HDL RATIO: 2.3
CHOLESTEROL: 176 MG/DL
CO2: 31 MMOL/L (ref 21–32)
CO2: NORMAL MMOL/L
CREAT SERPL-MCNC: 0.7 MG/DL (ref 0.5–1.3)
CREAT SERPL-MCNC: NORMAL MG/DL
EGFR AFRICAN AMERICAN: 98
EGFR IF NONAFRICAN AMERICAN: 81
EOSINOPHILS ABSOLUTE: NORMAL /ΜL
EOSINOPHILS RELATIVE PERCENT: 1.3 %
EOSINOPHILS RELATIVE PERCENT: NORMAL %
GFR CALCULATED: NORMAL
GLUCOSE BLD-MCNC: NORMAL MG/DL
GLUCOSE: 175 MG/DL (ref 70–100)
HBA1C MFR BLD: 7.5 %
HCT VFR BLD CALC: 41.2 % (ref 36–48)
HCT VFR BLD CALC: NORMAL % (ref 36–46)
HDLC SERPL-MCNC: 76 MG/DL (ref 35–70)
HDLC SERPL-MCNC: 76 MG/DL (ref 40–60)
HEMOGLOBIN: 13.3 G/DL (ref 12–16)
HEMOGLOBIN: NORMAL G/DL (ref 12–16)
LDL CHOLESTEROL CALCULATED: 78 MG/DL
LDL CHOLESTEROL CALCULATED: 78 MG/DL (ref 0–160)
LDL/HDL RATIO: 1
LYMPHOCYTE %: 38.8 %
LYMPHOCYTES ABSOLUTE: NORMAL /ΜL
LYMPHOCYTES RELATIVE PERCENT: NORMAL %
MCH RBC QN AUTO: 28.1 PG (ref 27–34)
MCH RBC QN AUTO: NORMAL PG
MCHC RBC AUTO-ENTMCNC: 32.3 G/DL (ref 31–36)
MCHC RBC AUTO-ENTMCNC: NORMAL G/DL
MCV RBC AUTO: 87.1 FL (ref 80–100)
MCV RBC AUTO: NORMAL FL
MONOCYTES # BLD: 8.1 %
MONOCYTES ABSOLUTE: NORMAL /ΜL
MONOCYTES RELATIVE PERCENT: NORMAL %
NEUTROPHILS ABSOLUTE: NORMAL /ΜL
NEUTROPHILS RELATIVE PERCENT: 50.8 %
NEUTROPHILS RELATIVE PERCENT: NORMAL %
PDW BLD-RTO: 13 % (ref 10.8–14.8)
PDW BLD-RTO: NORMAL %
PLATELET # BLD: NORMAL K/ΜL
PLATELETS: 338 K/UL (ref 150–450)
PMV BLD AUTO: NORMAL FL
POTASSIUM SERPL-SCNC: 4.5 MMOL/L (ref 3.5–5.4)
POTASSIUM SERPL-SCNC: NORMAL MMOL/L
RBC # BLD: NORMAL 10^6/ΜL
RBC: 4.73 M/UL (ref 4–5.5)
SODIUM BLD-SCNC: 140 MMOL/L (ref 134–147)
SODIUM BLD-SCNC: NORMAL MMOL/L
TOTAL PROTEIN: 6.8 G/DL (ref 5.8–8)
TOTAL PROTEIN: NORMAL
TRIGL SERPL-MCNC: 112 MG/DL
TRIGL SERPL-MCNC: 112 MG/DL
VLDLC SERPL CALC-MCNC: 22 MG/DL
VLDLC SERPL CALC-MCNC: ABNORMAL MG/DL
WBC # BLD: NORMAL 10^3/ML
WBC: 10.1 K/UL (ref 3.7–10.8)

## 2017-12-14 LAB
AVERAGE GLUCOSE: 169 MG/DL (ref 66–114)
HBA1C MFR BLD: 7.5 % (ref 4.2–5.8)

## 2017-12-18 ENCOUNTER — OFFICE VISIT (OUTPATIENT)
Dept: FAMILY MEDICINE CLINIC | Age: 77
End: 2017-12-18
Payer: MEDICARE

## 2017-12-18 VITALS
WEIGHT: 171 LBS | HEART RATE: 106 BPM | SYSTOLIC BLOOD PRESSURE: 130 MMHG | BODY MASS INDEX: 31.27 KG/M2 | DIASTOLIC BLOOD PRESSURE: 60 MMHG

## 2017-12-18 DIAGNOSIS — I10 ESSENTIAL HYPERTENSION: ICD-10-CM

## 2017-12-18 DIAGNOSIS — M17.11 PRIMARY OSTEOARTHRITIS OF RIGHT KNEE: ICD-10-CM

## 2017-12-18 DIAGNOSIS — Z23 IMMUNIZATION DUE: ICD-10-CM

## 2017-12-18 DIAGNOSIS — E78.5 DYSLIPIDEMIA: ICD-10-CM

## 2017-12-18 DIAGNOSIS — E11.42 TYPE 2 DIABETES MELLITUS WITH DIABETIC POLYNEUROPATHY, WITH LONG-TERM CURRENT USE OF INSULIN (HCC): Primary | ICD-10-CM

## 2017-12-18 DIAGNOSIS — Z79.4 TYPE 2 DIABETES MELLITUS WITH DIABETIC POLYNEUROPATHY, WITH LONG-TERM CURRENT USE OF INSULIN (HCC): Primary | ICD-10-CM

## 2017-12-18 PROCEDURE — G8427 DOCREV CUR MEDS BY ELIG CLIN: HCPCS | Performed by: FAMILY MEDICINE

## 2017-12-18 PROCEDURE — 99213 OFFICE O/P EST LOW 20 MIN: CPT | Performed by: FAMILY MEDICINE

## 2017-12-18 PROCEDURE — G0008 ADMIN INFLUENZA VIRUS VAC: HCPCS | Performed by: FAMILY MEDICINE

## 2017-12-18 PROCEDURE — 1090F PRES/ABSN URINE INCON ASSESS: CPT | Performed by: FAMILY MEDICINE

## 2017-12-18 PROCEDURE — 1123F ACP DISCUSS/DSCN MKR DOCD: CPT | Performed by: FAMILY MEDICINE

## 2017-12-18 PROCEDURE — 90688 IIV4 VACCINE SPLT 0.5 ML IM: CPT | Performed by: FAMILY MEDICINE

## 2017-12-18 PROCEDURE — G8417 CALC BMI ABV UP PARAM F/U: HCPCS | Performed by: FAMILY MEDICINE

## 2017-12-18 PROCEDURE — G8400 PT W/DXA NO RESULTS DOC: HCPCS | Performed by: FAMILY MEDICINE

## 2017-12-18 PROCEDURE — 1036F TOBACCO NON-USER: CPT | Performed by: FAMILY MEDICINE

## 2017-12-18 PROCEDURE — 4040F PNEUMOC VAC/ADMIN/RCVD: CPT | Performed by: FAMILY MEDICINE

## 2017-12-18 PROCEDURE — G8484 FLU IMMUNIZE NO ADMIN: HCPCS | Performed by: FAMILY MEDICINE

## 2017-12-18 RX ORDER — OXYCODONE AND ACETAMINOPHEN 7.5; 325 MG/1; MG/1
1 TABLET ORAL EVERY 4 HOURS PRN
Qty: 100 TABLET | Refills: 0 | Status: SHIPPED | OUTPATIENT
Start: 2017-12-18 | End: 2018-01-03 | Stop reason: SDUPTHER

## 2017-12-18 ASSESSMENT — ENCOUNTER SYMPTOMS
SORE THROAT: 0
SHORTNESS OF BREATH: 0
NAUSEA: 0
BACK PAIN: 1
VOMITING: 0
DIARRHEA: 0
COUGH: 0
SINUS PRESSURE: 0

## 2017-12-18 NOTE — PROGRESS NOTES
Topic Date Due    DTaP/Tdap/Td vaccine (1 - Tdap) 04/21/1960    Zostavax vaccine  04/21/2001    DEXA (modify frequency per FRAX score)  04/21/2006    Pneumococcal low/med risk (2 of 2 - PPSV23) 10/27/2016    Flu vaccine (1) 09/01/2017    Lipid screen  12/13/2022         Past Medical History:   Diagnosis Date    DM2 (diabetes mellitus, type 2) ([address]     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    Hyperlipidemia     Hypertension     Osteoarthritis       Past Surgical History:   Procedure Laterality Date    BREAST SURGERY      COLONOSCOPY      last one 4 years, pt is unsure of where    RI EGD REMOVAL TUMOR POLYP/OTHER LESION SNARE TECH N/A 4/3/2017    EGD POLYP SNARE performed by Emily Mina MD at [address] [place] [address]  04/03/2017    In the 2nd part of the duodenum there is a 3-4 cm mass lesion seen, occupies about half of the lumen-pathology--adenoma     Family History   Problem Relation Age of Onset    Heart Disease Mother     No Known Problems Father      Social History   Substance Use Topics    Smoking status: Former Smoker     Types: Cigarettes     Quit date: 10/15/2003    Smokeless tobacco: Never Used    Alcohol use No      Current Outpatient Prescriptions   Medication Sig Dispense Refill    oxyCODONE-acetaminophen (PERCOCET) 7.5-325 MG per tablet Take 1 tablet by mouth every 4 hours as needed for Pain . 100 tablet 0    insulin 70-30 (HUMULIN 70/30) (70-30) 100 UNIT per ML injection vial 80 units in am and 50 units in pm 4 vial 3    atorvastatin (LIPITOR) 10 MG tablet TAKE 1 TABLET BY MOUTH ONE TIME A DAY  90 tablet 3    MONOJECT INS SYR 1CC/30G 30G X 5/16\" 1 ML MISC USE  TO INJECT TWICE DAILY 100 each 5    meclizine (ANTIVERT) 12.5 MG tablet TAKE 1 TABLET THREE TIMES DAILY AS NEEDED 270 tablet 1    lisinopril (PRINIVIL;ZESTRIL) 10 MG tablet TAKE 1 TABLET EVERY DAY 90 tablet 3    glimepiride (AMARYL) 2 MG tablet TAKE 1 TABLET EVERY MORNING (BEFORE BREAKFAST). 90 tablet 3    aspirin 325 MG tablet Take 325 mg by mouth daily.  ezetimibe-simvastatin (VYTORIN) 10-20 MG per tablet Take 1 tablet by mouth nightly.  naproxen sodium (ALEVE) 220 MG tablet Take 220 mg by mouth 2 times daily as needed. No current facility-administered medications for this visit. Allergies   Allergen Reactions    Nubain [Nalbuphine Hcl]     Vistaril [Hydroxyzine Hcl] Nausea And Vomiting         Subjective:   Review of Systems   Constitutional: Negative for chills, diaphoresis and fever. HENT: Negative for congestion, sinus pressure and sore throat. Eyes: Negative for visual disturbance. Respiratory: Negative for cough and shortness of breath. Cardiovascular: Negative for chest pain and leg swelling. Gastrointestinal: Negative for diarrhea, nausea and vomiting. Genitourinary: Negative for dysuria, menstrual problem, urgency and vaginal discharge. Musculoskeletal: Positive for arthralgias, back pain and gait problem. Negative for myalgias. Skin: Negative for rash. Neurological: Positive for weakness and numbness. Negative for headaches. Psychiatric/Behavioral: Negative for sleep disturbance. Objective:   /60   Pulse 106   Wt 171 lb (77.6 kg)   BMI 31.27 kg/m²     Physical Exam   Constitutional: She is oriented to person, place, and time. She appears well-developed and well-nourished. No distress. HENT:   Head: Normocephalic and atraumatic. Mouth/Throat: No oropharyngeal exudate. Eyes: No scleral icterus. Neck: Neck supple. Carotid bruit is not present. Cardiovascular: Exam reveals no gallop and no friction rub. No murmur heard. Pulmonary/Chest: No respiratory distress. She has no wheezes. She has no rales. She exhibits no tenderness. Musculoskeletal: She exhibits tenderness. She exhibits no edema. Right knee: She exhibits decreased range of motion and bony tenderness. She exhibits normal patellar mobility. Tenderness found. Lateral joint line and patellar tendon tenderness noted. No medial joint line tenderness noted. Lymphadenopathy:     She has no cervical adenopathy. Neurological: She is alert and oriented to person, place, and time. No cranial nerve deficit. Skin: No rash noted. She is not diaphoretic. Psychiatric: She has a normal mood and affect. Her behavior is normal. Judgment and thought content normal.       Assessment:      1. Type 2 diabetes mellitus with diabetic polyneuropathy, with long-term current use of insulin ([facility] [address]     2. Immunization due  INFLUENZA, QUADV, 3 YRS AND OLDER, IM, MDV, 0.5ML (FLUZONE QUADV)   3. Essential hypertension     4. Dyslipidemia     5. Primary osteoarthritis of right knee  oxyCODONE-acetaminophen (PERCOCET) 7.5-325 MG per tablet         Plan:      No Follow-up on file. Orders Placed This Encounter   Procedures    INFLUENZA, QUADV, 3 YRS AND OLDER, IM, MDV, 0.5ML (FLUZONE QUADV)     Orders Placed This Encounter   Medications    oxyCODONE-acetaminophen (PERCOCET) 7.5-325 MG per tablet     Sig: Take 1 tablet by mouth every 4 hours as needed for Pain . Dispense:  100 tablet     Refill:  0     Controlled Substances Monitoring:     Attestation: The Prescription Monitoring Report for this patient was reviewed today.  Mekhi Jolly MD)

## 2017-12-20 DIAGNOSIS — Z79.4 TYPE 2 DIABETES MELLITUS WITH DIABETIC POLYNEUROPATHY, WITH LONG-TERM CURRENT USE OF INSULIN (HCC): ICD-10-CM

## 2017-12-20 DIAGNOSIS — E11.42 TYPE 2 DIABETES MELLITUS WITH DIABETIC POLYNEUROPATHY, WITH LONG-TERM CURRENT USE OF INSULIN (HCC): ICD-10-CM

## 2017-12-20 DIAGNOSIS — I10 ESSENTIAL HYPERTENSION: ICD-10-CM

## 2017-12-20 DIAGNOSIS — E78.5 DYSLIPIDEMIA: ICD-10-CM

## 2018-01-03 DIAGNOSIS — M17.11 PRIMARY OSTEOARTHRITIS OF RIGHT KNEE: ICD-10-CM

## 2018-01-03 RX ORDER — OXYCODONE AND ACETAMINOPHEN 7.5; 325 MG/1; MG/1
1 TABLET ORAL EVERY 4 HOURS PRN
Qty: 100 TABLET | Refills: 0 | Status: SHIPPED | OUTPATIENT
Start: 2018-01-03 | End: 2018-02-02

## 2018-02-01 RX ORDER — ATORVASTATIN CALCIUM 10 MG/1
TABLET, FILM COATED ORAL
Qty: 90 TABLET | Refills: 3 | Status: SHIPPED | OUTPATIENT
Start: 2018-02-01 | End: 2019-04-09 | Stop reason: SDUPTHER

## 2018-03-07 ENCOUNTER — TELEPHONE (OUTPATIENT)
Dept: FAMILY MEDICINE CLINIC | Age: 78
End: 2018-03-07

## 2018-03-07 RX ORDER — MECLIZINE HCL 12.5 MG/1
TABLET ORAL
Qty: 270 TABLET | Refills: 1 | Status: SHIPPED | OUTPATIENT
Start: 2018-03-07 | End: 2018-09-10 | Stop reason: SDUPTHER

## 2018-03-07 NOTE — TELEPHONE ENCOUNTER
LR:  10/12/17  LV:   12/18/17    Next Visit Date:  Future Appointments  Date Time Provider Jessica Webbisti   3/8/2018 11:30 AM [facility] [place] [facility]SE [facility] [place]   6/22/2018 11:00 AM Mic Dill  [address] [facility] Maintenance   Topic Date Due    DTaP/Tdap/Td vaccine (1 - Tdap) 04/21/1960    Shingles Vaccine (1 of 2 - 2 Dose Series) 04/21/1991    DEXA (modify frequency per FRAX score)  04/21/2006    Pneumococcal low/med risk (2 of 2 - PPSV23) 10/27/2016    Potassium monitoring  12/13/2018    Creatinine monitoring  12/13/2018    Lipid screen  12/13/2022    Flu vaccine  Completed       Hemoglobin A1C (%)   Date Value   12/13/2017 7.5 (H)   12/13/2017 7.5   06/14/2017 9.3 (H)             ( goal A1C is < 7)   No results found for: LABMICR  LDL Cholesterol (mg/dL)   Date Value   06/14/2017 107     LDL Calculated (mg/dL)   Date Value   12/13/2017 78       (goal LDL is <100)   AST (IU/L)   Date Value   12/13/2017 20     ALT (IU/L)   Date Value   12/13/2017 19     BUN (mg/dl)   Date Value   12/13/2017 13     BP Readings from Last 3 Encounters:   12/18/17 130/60   08/24/17 130/74   07/18/17 134/74          (goal 120/80)    All Future Testing planned in [facility]  Lab Frequency Next Occurrence   RUMA Diagnostic Bilateral Once 12/19/2017               Patient Active Problem List:     COPD (chronic obstructive pulmonary disease) ([facility] [address]     Hypertension     Dyslipidemia     Osteoporosis     Osteoarthritis     Type 2 diabetes mellitus with diabetic polyneuropathy (HCC)     Abnormal CT of the abdomen     Diverticulitis of intestine without perforation or abscess without bleeding     Duodenal adenoma

## 2018-03-08 ENCOUNTER — HOSPITAL ENCOUNTER (OUTPATIENT)
Dept: MAMMOGRAPHY | Age: 78
Discharge: HOME | End: 2018-03-10
Payer: MEDICARE

## 2018-03-08 DIAGNOSIS — Z12.39 BREAST CANCER SCREENING: ICD-10-CM

## 2018-03-08 PROCEDURE — 77067 SCR MAMMO BI INCL CAD: CPT

## 2018-03-21 ENCOUNTER — HOSPITAL ENCOUNTER (OUTPATIENT)
Dept: MAMMOGRAPHY | Age: 78
Discharge: HOME | End: 2018-03-23
Payer: MEDICARE

## 2018-03-21 DIAGNOSIS — R92.8 ABNORMAL MAMMOGRAM: ICD-10-CM

## 2018-03-21 PROCEDURE — G0279 TOMOSYNTHESIS, MAMMO: HCPCS

## 2018-03-23 RX ORDER — LISINOPRIL 10 MG/1
TABLET ORAL
Qty: 90 TABLET | Refills: 3 | Status: SHIPPED | OUTPATIENT
Start: 2018-03-23 | End: 2019-04-09 | Stop reason: SDUPTHER

## 2018-03-23 RX ORDER — GLIMEPIRIDE 2 MG/1
TABLET ORAL
Qty: 90 TABLET | Refills: 3 | Status: SHIPPED | OUTPATIENT
Start: 2018-03-23 | End: 2018-09-19 | Stop reason: SINTOL

## 2018-04-03 RX ORDER — HUMAN INSULIN 100 [IU]/ML
INJECTION, SUSPENSION SUBCUTANEOUS
Qty: 3 VIAL | Refills: 3 | Status: SHIPPED | OUTPATIENT
Start: 2018-04-03 | End: 2018-05-03 | Stop reason: SDUPTHER

## 2018-05-03 RX ORDER — GLUCOSAMINE HCL/CHONDROITIN SU 500-400 MG
CAPSULE ORAL
Qty: 100 STRIP | Refills: 11 | Status: SHIPPED | OUTPATIENT
Start: 2018-05-03 | End: 2018-05-04 | Stop reason: SDUPTHER

## 2018-05-04 RX ORDER — GLUCOSAMINE HCL/CHONDROITIN SU 500-400 MG
CAPSULE ORAL
Qty: 100 STRIP | Refills: 11 | Status: SHIPPED | OUTPATIENT
Start: 2018-05-04 | End: 2019-05-15 | Stop reason: SDUPTHER

## 2018-06-15 ENCOUNTER — TELEPHONE (OUTPATIENT)
Dept: FAMILY MEDICINE CLINIC | Age: 78
End: 2018-06-15

## 2018-06-15 ENCOUNTER — OFFICE VISIT (OUTPATIENT)
Dept: FAMILY MEDICINE CLINIC | Age: 78
End: 2018-06-15
Payer: MEDICARE

## 2018-06-15 VITALS
DIASTOLIC BLOOD PRESSURE: 50 MMHG | HEART RATE: 103 BPM | BODY MASS INDEX: 31.27 KG/M2 | SYSTOLIC BLOOD PRESSURE: 130 MMHG | OXYGEN SATURATION: 92 % | WEIGHT: 171 LBS

## 2018-06-15 DIAGNOSIS — I10 ESSENTIAL HYPERTENSION: Primary | ICD-10-CM

## 2018-06-15 DIAGNOSIS — M17.11 PRIMARY OSTEOARTHRITIS OF RIGHT KNEE: ICD-10-CM

## 2018-06-15 DIAGNOSIS — M75.81 TENDINITIS OF RIGHT ROTATOR CUFF: ICD-10-CM

## 2018-06-15 DIAGNOSIS — E78.5 DYSLIPIDEMIA: ICD-10-CM

## 2018-06-15 DIAGNOSIS — Z79.4 TYPE 2 DIABETES MELLITUS WITH DIABETIC POLYNEUROPATHY, WITH LONG-TERM CURRENT USE OF INSULIN (HCC): ICD-10-CM

## 2018-06-15 DIAGNOSIS — E11.42 TYPE 2 DIABETES MELLITUS WITH DIABETIC POLYNEUROPATHY, WITH LONG-TERM CURRENT USE OF INSULIN (HCC): ICD-10-CM

## 2018-06-15 LAB — HBA1C MFR BLD: 9.4 %

## 2018-06-15 PROCEDURE — 1090F PRES/ABSN URINE INCON ASSESS: CPT | Performed by: FAMILY MEDICINE

## 2018-06-15 PROCEDURE — 1123F ACP DISCUSS/DSCN MKR DOCD: CPT | Performed by: FAMILY MEDICINE

## 2018-06-15 PROCEDURE — G8427 DOCREV CUR MEDS BY ELIG CLIN: HCPCS | Performed by: FAMILY MEDICINE

## 2018-06-15 PROCEDURE — 4040F PNEUMOC VAC/ADMIN/RCVD: CPT | Performed by: FAMILY MEDICINE

## 2018-06-15 PROCEDURE — 99213 OFFICE O/P EST LOW 20 MIN: CPT | Performed by: FAMILY MEDICINE

## 2018-06-15 PROCEDURE — G8400 PT W/DXA NO RESULTS DOC: HCPCS | Performed by: FAMILY MEDICINE

## 2018-06-15 PROCEDURE — 1036F TOBACCO NON-USER: CPT | Performed by: FAMILY MEDICINE

## 2018-06-15 PROCEDURE — 83036 HEMOGLOBIN GLYCOSYLATED A1C: CPT | Performed by: FAMILY MEDICINE

## 2018-06-15 PROCEDURE — G8417 CALC BMI ABV UP PARAM F/U: HCPCS | Performed by: FAMILY MEDICINE

## 2018-06-15 RX ORDER — OXYCODONE HYDROCHLORIDE AND ACETAMINOPHEN 5; 325 MG/1; MG/1
1 TABLET ORAL EVERY 6 HOURS PRN
Qty: 120 TABLET | Refills: 0 | Status: SHIPPED | OUTPATIENT
Start: 2018-06-15 | End: 2019-03-07 | Stop reason: SDUPTHER

## 2018-06-15 ASSESSMENT — ENCOUNTER SYMPTOMS
COUGH: 0
SINUS PRESSURE: 0
VOMITING: 0
DIARRHEA: 0
BACK PAIN: 1
SHORTNESS OF BREATH: 0
NAUSEA: 0
SORE THROAT: 0

## 2018-06-15 NOTE — TELEPHONE ENCOUNTER
Does patient need lab orders for her September appointment? If yes please print them and I will mail them to her.       Thanks,  Trevor Gaffney

## 2018-07-25 ENCOUNTER — TELEPHONE (OUTPATIENT)
Dept: FAMILY MEDICINE CLINIC | Age: 78
End: 2018-07-25

## 2018-07-25 NOTE — TELEPHONE ENCOUNTER
Patient is calling to ask if you will prescribe gabapentin for the \"intense pain\" in both hands. She says she has been suffering with this for months. She has been using aspercreme, but it doesn't work. She says that she would only use it when she goes to bed at night because she is her husbands caregiver.   [facility] on [address]

## 2018-07-26 RX ORDER — GABAPENTIN 100 MG/1
CAPSULE ORAL
Qty: 60 CAPSULE | Refills: 2 | Status: SHIPPED | OUTPATIENT
Start: 2018-07-26 | End: 2018-09-19 | Stop reason: CLARIF

## 2018-07-26 NOTE — TELEPHONE ENCOUNTER
Ok gabapentin 100 mg one po hs for one week, may increase to 200 mg if not effective at 100 mg. #60 x 2 refill.

## 2018-08-10 RX ORDER — SYRINGE AND NEEDLE,INSULIN,1ML 30 GX5/16"
SYRINGE, EMPTY DISPOSABLE MISCELLANEOUS
Qty: 200 EACH | Refills: 5 | Status: SHIPPED | OUTPATIENT
Start: 2018-08-10 | End: 2019-12-27

## 2018-08-10 NOTE — TELEPHONE ENCOUNTER
Health Maintenance   Topic Date Due    DTaP/Tdap/Td vaccine (1 - Tdap) 04/21/1960    Shingles Vaccine (1 of 2 - 2 Dose Series) 04/21/1991    DEXA (modify frequency per FRAX score)  04/21/2006    Pneumococcal low/med risk (2 of 2 - PPSV23) 10/27/2016    Flu vaccine (1) 09/01/2018    Potassium monitoring  12/13/2018    Creatinine monitoring  12/13/2018       Hemoglobin A1C (%)   Date Value   06/15/2018 9.4   12/13/2017 7.5 (H)   12/13/2017 7.5             ( goal A1C is < 7)   No results found for: LABMICR  LDL Cholesterol (mg/dL)   Date Value   06/14/2017 107     LDL Calculated (mg/dL)   Date Value   12/13/2017 78       (goal LDL is <100)   AST (IU/L)   Date Value   12/13/2017 20     ALT (IU/L)   Date Value   12/13/2017 19     BUN (mg/dl)   Date Value   12/13/2017 13     BP Readings from Last 3 Encounters:   06/15/18 (!) 130/50   12/18/17 130/60   08/24/17 130/74          (goal 120/80)    All Future Testing planned in [facility]  Lab Frequency Next Occurrence       Next Visit Date:  Future Appointments  Date Time Provider Jessica Ashraf   9/17/2018 10:45 AM Lino Brooke MD [facility]            Patient Active Problem List:     COPD (chronic obstructive pulmonary disease) ([address]     Hypertension     Dyslipidemia     Osteoporosis     Osteoarthritis     Type 2 diabetes mellitus with diabetic polyneuropathy (HCC)     Abnormal CT of the abdomen     Diverticulitis of intestine without perforation or abscess without bleeding     Duodenal adenoma

## 2018-08-30 ENCOUNTER — TELEPHONE (OUTPATIENT)
Dept: FAMILY MEDICINE CLINIC | Age: 78
End: 2018-08-30

## 2018-08-30 RX ORDER — GABAPENTIN 300 MG/1
300 CAPSULE ORAL 3 TIMES DAILY
Qty: 90 CAPSULE | Refills: 0 | Status: SHIPPED | OUTPATIENT
Start: 2018-08-30 | End: 2018-11-18 | Stop reason: SDUPTHER

## 2018-08-30 NOTE — TELEPHONE ENCOUNTER
If she's not tired from it, there's a lot of room to increase the dose if needed. The maximum dose is 2,700mg per day. If she's not tired, I would suggest we write the new Rx for 300mg capsules starting at 3 per day and continue to increase it as needed. Please make sure she's ok with that.

## 2018-09-11 RX ORDER — MECLIZINE HCL 12.5 MG/1
TABLET ORAL
Qty: 270 TABLET | Refills: 1 | Status: SHIPPED | OUTPATIENT
Start: 2018-09-11 | End: 2019-09-19 | Stop reason: SDUPTHER

## 2018-09-11 NOTE — TELEPHONE ENCOUNTER
Health Maintenance   Topic Date Due    DTaP/Tdap/Td vaccine (1 - Tdap) 04/21/1960    Shingles Vaccine (1 of 2 - 2 Dose Series) 04/21/1991    DEXA (modify frequency per FRAX score)  04/21/2006    Pneumococcal low/med risk (2 of 2 - PPSV23) 10/27/2016    Flu vaccine (1) 09/01/2018    Potassium monitoring  12/13/2018    Creatinine monitoring  12/13/2018       Hemoglobin A1C (%)   Date Value   06/15/2018 9.4   12/13/2017 7.5 (H)   12/13/2017 7.5             ( goal A1C is < 7)   No results found for: LABMICR  LDL Cholesterol (mg/dL)   Date Value   06/14/2017 107     LDL Calculated (mg/dL)   Date Value   12/13/2017 78       (goal LDL is <100)   AST (IU/L)   Date Value   12/13/2017 20     ALT (IU/L)   Date Value   12/13/2017 19     BUN (mg/dl)   Date Value   12/13/2017 13     BP Readings from Last 3 Encounters:   06/15/18 (!) 130/50   12/18/17 130/60   08/24/17 130/74          (goal 120/80)    All Future Testing planned in [facility]  Lab Frequency Next Occurrence       Next Visit Date:  Future Appointments  Date Time Provider Jessica Ashraf   9/17/2018 10:45 AM MD DENZEL Pete            Patient Active Problem List:     COPD (chronic obstructive pulmonary disease) ([address]     Hypertension     Dyslipidemia     Osteoporosis     Osteoarthritis     Type 2 diabetes mellitus with diabetic polyneuropathy (HCC)     Abnormal CT of the abdomen     Diverticulitis of intestine without perforation or abscess without bleeding     Duodenal adenoma

## 2018-09-19 ENCOUNTER — OFFICE VISIT (OUTPATIENT)
Dept: FAMILY MEDICINE CLINIC | Age: 78
End: 2018-09-19
Payer: MEDICARE

## 2018-09-19 VITALS
HEART RATE: 99 BPM | OXYGEN SATURATION: 95 % | SYSTOLIC BLOOD PRESSURE: 134 MMHG | WEIGHT: 172 LBS | DIASTOLIC BLOOD PRESSURE: 68 MMHG | BODY MASS INDEX: 31.45 KG/M2

## 2018-09-19 DIAGNOSIS — Z23 IMMUNIZATION DUE: ICD-10-CM

## 2018-09-19 DIAGNOSIS — L21.9 SEBORRHEIC DERMATITIS: ICD-10-CM

## 2018-09-19 DIAGNOSIS — E11.42 TYPE 2 DIABETES MELLITUS WITH DIABETIC POLYNEUROPATHY, WITH LONG-TERM CURRENT USE OF INSULIN (HCC): ICD-10-CM

## 2018-09-19 DIAGNOSIS — Z79.4 TYPE 2 DIABETES MELLITUS WITH DIABETIC POLYNEUROPATHY, WITH LONG-TERM CURRENT USE OF INSULIN (HCC): ICD-10-CM

## 2018-09-19 DIAGNOSIS — I10 ESSENTIAL HYPERTENSION: Primary | ICD-10-CM

## 2018-09-19 PROCEDURE — G8400 PT W/DXA NO RESULTS DOC: HCPCS | Performed by: FAMILY MEDICINE

## 2018-09-19 PROCEDURE — G0008 ADMIN INFLUENZA VIRUS VAC: HCPCS | Performed by: FAMILY MEDICINE

## 2018-09-19 PROCEDURE — 1101F PT FALLS ASSESS-DOCD LE1/YR: CPT | Performed by: FAMILY MEDICINE

## 2018-09-19 PROCEDURE — 90688 IIV4 VACCINE SPLT 0.5 ML IM: CPT | Performed by: FAMILY MEDICINE

## 2018-09-19 PROCEDURE — 99213 OFFICE O/P EST LOW 20 MIN: CPT | Performed by: FAMILY MEDICINE

## 2018-09-19 PROCEDURE — 1123F ACP DISCUSS/DSCN MKR DOCD: CPT | Performed by: FAMILY MEDICINE

## 2018-09-19 PROCEDURE — G8427 DOCREV CUR MEDS BY ELIG CLIN: HCPCS | Performed by: FAMILY MEDICINE

## 2018-09-19 PROCEDURE — 4040F PNEUMOC VAC/ADMIN/RCVD: CPT | Performed by: FAMILY MEDICINE

## 2018-09-19 PROCEDURE — 1090F PRES/ABSN URINE INCON ASSESS: CPT | Performed by: FAMILY MEDICINE

## 2018-09-19 PROCEDURE — 1036F TOBACCO NON-USER: CPT | Performed by: FAMILY MEDICINE

## 2018-09-19 PROCEDURE — G8417 CALC BMI ABV UP PARAM F/U: HCPCS | Performed by: FAMILY MEDICINE

## 2018-09-19 RX ORDER — KETOCONAZOLE 20 MG/ML
SHAMPOO TOPICAL
Qty: 120 ML | Refills: 5 | Status: SHIPPED | OUTPATIENT
Start: 2018-09-19 | End: 2018-10-08 | Stop reason: SDUPTHER

## 2018-09-19 ASSESSMENT — ENCOUNTER SYMPTOMS
BACK PAIN: 0
DIARRHEA: 0
VOMITING: 0
COUGH: 0
SORE THROAT: 0
SHORTNESS OF BREATH: 0
NAUSEA: 0
SINUS PRESSURE: 0

## 2018-09-19 ASSESSMENT — PATIENT HEALTH QUESTIONNAIRE - PHQ9
1. LITTLE INTEREST OR PLEASURE IN DOING THINGS: 1
2. FEELING DOWN, DEPRESSED OR HOPELESS: 1
SUM OF ALL RESPONSES TO PHQ QUESTIONS 1-9: 2
SUM OF ALL RESPONSES TO PHQ QUESTIONS 1-9: 2
SUM OF ALL RESPONSES TO PHQ9 QUESTIONS 1 & 2: 2

## 2018-09-19 NOTE — PROGRESS NOTES
She is not diaphoretic. Psychiatric: She has a normal mood and affect. Her behavior is normal. Judgment and thought content normal.       Assessment:       Diagnosis Orders   1. Essential hypertension  CBC Auto Differential    Comprehensive Metabolic Panel    Lipid Panel   2. Immunization due  INFLUENZA, QUADV, 3 YRS AND OLDER, IM, MDV, 0.5ML (FLUZONE QUADV)   3. Seborrheic dermatitis  ketoconazole (NIZORAL) 2 % shampoo   4. Type 2 diabetes mellitus with diabetic polyneuropathy, with long-term current use of insulin ([facility])  Comprehensive Metabolic Panel    Lipid Panel    Hemoglobin A1C         Plan:      Return in about 3 months (around 12/19/2018). Orders Placed This Encounter   Procedures    INFLUENZA, QUADV, 3 YRS AND OLDER, IM, MDV, 0.5ML (FLUZONE QUADV)    CBC Auto Differential     Standing Status:   Future     Standing Expiration Date:   9/19/2019    Comprehensive Metabolic Panel     Standing Status:   Future     Standing Expiration Date:   9/19/2019    Lipid Panel     Standing Status:   Future     Standing Expiration Date:   9/19/2019     Order Specific Question:   Is Patient Fasting?/# of Hours     Answer:   12 hour fast    Hemoglobin A1C     Standing Status:   Future     Standing Expiration Date:   9/19/2019     Orders Placed This Encounter   Medications    ketoconazole (NIZORAL) 2 % shampoo     Sig: Apply topically daily as needed. Dispense:  120 mL     Refill:  5     Stop glimepiride.

## 2018-10-08 ENCOUNTER — TELEPHONE (OUTPATIENT)
Dept: FAMILY MEDICINE CLINIC | Age: 78
End: 2018-10-08

## 2018-10-08 DIAGNOSIS — L21.9 SEBORRHEIC DERMATITIS: ICD-10-CM

## 2018-10-08 RX ORDER — KETOCONAZOLE 20 MG/ML
SHAMPOO TOPICAL
Qty: 120 ML | Refills: 5 | Status: SHIPPED | OUTPATIENT
Start: 2018-10-08 | End: 2020-02-07

## 2018-10-08 RX ORDER — FLUOCINOLONE ACETONIDE 0.1 MG/ML
SOLUTION TOPICAL
Qty: 1 BOTTLE | Refills: 0 | Status: SHIPPED | OUTPATIENT
Start: 2018-10-08 | End: 2020-02-07

## 2018-11-19 RX ORDER — GABAPENTIN 300 MG/1
CAPSULE ORAL
Qty: 90 CAPSULE | Refills: 5 | Status: SHIPPED | OUTPATIENT
Start: 2018-11-19 | End: 2019-07-05 | Stop reason: SDUPTHER

## 2018-11-19 RX ORDER — GABAPENTIN 300 MG/1
CAPSULE ORAL
Qty: 90 CAPSULE | Refills: 0 | Status: SHIPPED | OUTPATIENT
Start: 2018-11-19 | End: 2018-11-19 | Stop reason: SDUPTHER

## 2018-12-17 LAB
ABSOLUTE BASO #: 0 K/UL (ref 0–0.1)
ABSOLUTE EOS #: 0.1 K/UL (ref 0.1–0.4)
ABSOLUTE LYMPH #: 2.8 K/UL (ref 0.8–5.2)
ABSOLUTE MONO #: 0.7 K/UL (ref 0.1–0.9)
ABSOLUTE NEUT #: 5.8 K/UL (ref 1.3–9.1)
BASOPHILS RELATIVE PERCENT: 0.4 %
EOSINOPHILS RELATIVE PERCENT: 0.5 %
HCT VFR BLD CALC: 40.3 % (ref 36–48)
HEMOGLOBIN: 13.2 G/DL (ref 12–16)
LYMPHOCYTE %: 30 %
MCH RBC QN AUTO: 27.8 PG (ref 27–34)
MCHC RBC AUTO-ENTMCNC: 32.8 G/DL (ref 31–36)
MCV RBC AUTO: 85 FL (ref 80–100)
MONOCYTES # BLD: 7.7 %
NEUTROPHILS RELATIVE PERCENT: 61.1 %
PDW BLD-RTO: 12.9 % (ref 10.8–14.8)
PLATELETS: 328 K/UL (ref 150–450)
RBC: 4.74 M/UL (ref 4–5.5)
WBC: 9.4 K/UL (ref 3.7–10.8)

## 2018-12-18 LAB
ALBUMIN SERPL-MCNC: 4.3 G/DL (ref 3.5–5.2)
ALK PHOSPHATASE: 98 U/L (ref 30–146)
ALT SERPL-CCNC: 19 U/L (ref 5–40)
ANION GAP SERPL CALCULATED.3IONS-SCNC: 11 MEQ/L (ref 10–19)
AST SERPL-CCNC: 23 U/L (ref 9–40)
AVERAGE GLUCOSE: 235 MG/DL (ref 66–114)
BILIRUB SERPL-MCNC: 0.4 MG/DL
BUN BLDV-MCNC: 16 MG/DL (ref 8–23)
CALCIUM SERPL-MCNC: 9.6 MG/DL (ref 8.5–10.5)
CHLORIDE BLD-SCNC: 100 MEQ/L (ref 95–107)
CHOLESTEROL/HDL RATIO: 2.4
CHOLESTEROL: 190 MG/DL
CO2: 31 MEQ/L (ref 19–31)
CREAT SERPL-MCNC: 0.7 MG/DL (ref 0.6–1.3)
EGFR AFRICAN AMERICAN: 96.9 ML/MIN/1.73 M2
EGFR IF NONAFRICAN AMERICAN: 83.6 ML/MIN/1.73 M2
GLUCOSE: 140 MG/DL (ref 70–99)
HBA1C MFR BLD: 9.8 %
HDLC SERPL-MCNC: 80.3 MG/DL
LDL CHOLESTEROL CALCULATED: 93 MG/DL
LDL/HDL RATIO: 1.2
POTASSIUM SERPL-SCNC: 5 MEQ/L (ref 3.5–5.4)
SODIUM BLD-SCNC: 142 MEQ/L (ref 135–146)
TOTAL PROTEIN: 7 G/DL (ref 6.1–8.3)
TRIGL SERPL-MCNC: 84 MG/DL
VLDLC SERPL CALC-MCNC: 17 MG/DL

## 2018-12-19 ENCOUNTER — OFFICE VISIT (OUTPATIENT)
Dept: FAMILY MEDICINE CLINIC | Age: 78
End: 2018-12-19
Payer: MEDICARE

## 2018-12-19 VITALS
HEIGHT: 62 IN | DIASTOLIC BLOOD PRESSURE: 64 MMHG | WEIGHT: 167 LBS | BODY MASS INDEX: 30.73 KG/M2 | HEART RATE: 99 BPM | SYSTOLIC BLOOD PRESSURE: 144 MMHG

## 2018-12-19 DIAGNOSIS — I10 ESSENTIAL HYPERTENSION: ICD-10-CM

## 2018-12-19 DIAGNOSIS — E11.42 DIABETIC POLYNEUROPATHY ASSOCIATED WITH TYPE 2 DIABETES MELLITUS (HCC): ICD-10-CM

## 2018-12-19 DIAGNOSIS — Z79.4 TYPE 2 DIABETES MELLITUS WITH DIABETIC POLYNEUROPATHY, WITH LONG-TERM CURRENT USE OF INSULIN (HCC): Primary | ICD-10-CM

## 2018-12-19 DIAGNOSIS — E11.42 TYPE 2 DIABETES MELLITUS WITH DIABETIC POLYNEUROPATHY, WITH LONG-TERM CURRENT USE OF INSULIN (HCC): Primary | ICD-10-CM

## 2018-12-19 DIAGNOSIS — E78.5 DYSLIPIDEMIA: ICD-10-CM

## 2018-12-19 PROCEDURE — 1101F PT FALLS ASSESS-DOCD LE1/YR: CPT | Performed by: FAMILY MEDICINE

## 2018-12-19 PROCEDURE — 4040F PNEUMOC VAC/ADMIN/RCVD: CPT | Performed by: FAMILY MEDICINE

## 2018-12-19 PROCEDURE — 1090F PRES/ABSN URINE INCON ASSESS: CPT | Performed by: FAMILY MEDICINE

## 2018-12-19 PROCEDURE — G8482 FLU IMMUNIZE ORDER/ADMIN: HCPCS | Performed by: FAMILY MEDICINE

## 2018-12-19 PROCEDURE — 99213 OFFICE O/P EST LOW 20 MIN: CPT | Performed by: FAMILY MEDICINE

## 2018-12-19 PROCEDURE — G8427 DOCREV CUR MEDS BY ELIG CLIN: HCPCS | Performed by: FAMILY MEDICINE

## 2018-12-19 PROCEDURE — G8417 CALC BMI ABV UP PARAM F/U: HCPCS | Performed by: FAMILY MEDICINE

## 2018-12-19 PROCEDURE — 1036F TOBACCO NON-USER: CPT | Performed by: FAMILY MEDICINE

## 2018-12-19 PROCEDURE — G8400 PT W/DXA NO RESULTS DOC: HCPCS | Performed by: FAMILY MEDICINE

## 2018-12-19 PROCEDURE — 1123F ACP DISCUSS/DSCN MKR DOCD: CPT | Performed by: FAMILY MEDICINE

## 2018-12-19 ASSESSMENT — ENCOUNTER SYMPTOMS
VOMITING: 0
COUGH: 0
NAUSEA: 0
SORE THROAT: 0
BACK PAIN: 0
SINUS PRESSURE: 0
SHORTNESS OF BREATH: 0
DIARRHEA: 0

## 2018-12-19 NOTE — PROGRESS NOTES
Anamaria Flores is a 69 y.o. female who presents todayfor her medical conditions/complaints as noted below. Anamaria Flores is c/o of Hypertension; Diabetes; and Discuss Labs  Routine follow up worsening hand pains burning in hands. HPI:     DIABETES and HYPERTENSION visit    BP Readings from Last 3 Encounters:   12/19/18 (!) 144/64   09/19/18 134/68   06/15/18 (!) 130/50        Hemoglobin A1C (%)   Date Value   12/17/2018 9.8 (H)   06/15/2018 9.4   12/13/2017 7.5 (H)     LDL Cholesterol (mg/dL)   Date Value   06/14/2017 107     LDL Calculated (mg/dL)   Date Value   12/17/2018 93     HDL (mg/dL)   Date Value   12/17/2018 80.3     BUN (mg/dL)   Date Value   12/17/2018 16     CREATININE (mg/dL)   Date Value   12/17/2018 0.7     Glucose (mg/dL)   Date Value   12/17/2018 140 (H)            Have you changed or started any medications since your last visit including any over-the-counter medicines, vitamins, or herbal medicines? no   Have you stopped taking any of your medications? Is so, why? -  no  Are you having any side effects from any of your medications? - no    Have you seen any other physician or provider since your last visit?  no   Have you had any other diagnostic tests since your last visit?  no   Have you been seen in the emergency room and/or had an admission in a hospital since we last saw you?  no   Have you had your routine dental cleaning in the past 6 months?  no     Have you had your annual diabetic retinal (eye) exam? No   (ensure copy of exam is in the chart)    Do you have an active MyChart account? If no, what is the barrier?   No:     Patient Care Team:  Jane Montoya MD as PCP - General (Family Medicine)    Medical History Review  Past Medical, Family, and Social History reviewed and  contribute to the patient presenting condition    Health Maintenance   Topic Date Due    DTaP/Tdap/Td vaccine (1 - Tdap) 04/21/1960    Shingles Vaccine (1 of 2 - 2 Dose Series) 04/21/1991    DEXA (modify

## 2019-01-11 RX ORDER — GABAPENTIN 300 MG/1
CAPSULE ORAL
Qty: 90 CAPSULE | Refills: 3 | Status: SHIPPED | OUTPATIENT
Start: 2019-01-11 | End: 2019-12-16 | Stop reason: SDUPTHER

## 2019-03-04 RX ORDER — HUMAN INSULIN 100 [IU]/ML
INJECTION, SUSPENSION SUBCUTANEOUS
Qty: 3 VIAL | Refills: 3 | Status: SHIPPED | OUTPATIENT
Start: 2019-03-04 | End: 2019-06-02 | Stop reason: SDUPTHER

## 2019-03-05 DIAGNOSIS — Z12.31 ENCOUNTER FOR SCREENING MAMMOGRAM FOR BREAST CANCER: Primary | ICD-10-CM

## 2019-03-07 DIAGNOSIS — M17.11 PRIMARY OSTEOARTHRITIS OF RIGHT KNEE: ICD-10-CM

## 2019-03-07 RX ORDER — OXYCODONE HYDROCHLORIDE AND ACETAMINOPHEN 5; 325 MG/1; MG/1
1 TABLET ORAL EVERY 6 HOURS PRN
Qty: 120 TABLET | Refills: 0 | Status: SHIPPED | OUTPATIENT
Start: 2019-03-07 | End: 2019-04-30 | Stop reason: SDUPTHER

## 2019-03-25 ENCOUNTER — HOSPITAL ENCOUNTER (OUTPATIENT)
Dept: MAMMOGRAPHY | Age: 79
Discharge: HOME | End: 2019-03-27
Payer: MEDICARE

## 2019-03-25 DIAGNOSIS — Z12.31 ENCOUNTER FOR SCREENING MAMMOGRAM FOR BREAST CANCER: ICD-10-CM

## 2019-03-25 PROCEDURE — 77063 BREAST TOMOSYNTHESIS BI: CPT

## 2019-04-09 RX ORDER — LISINOPRIL 10 MG/1
TABLET ORAL
Qty: 90 TABLET | Refills: 3 | Status: SHIPPED | OUTPATIENT
Start: 2019-04-09 | End: 2020-02-06

## 2019-04-09 RX ORDER — ATORVASTATIN CALCIUM 10 MG/1
TABLET, FILM COATED ORAL
Qty: 90 TABLET | Refills: 3 | Status: SHIPPED | OUTPATIENT
Start: 2019-04-09 | End: 2020-02-06

## 2019-04-09 NOTE — TELEPHONE ENCOUNTER
Last visit: 12/19/18    Next Visit Date:  Future Appointments   Date Time Provider Jessica Chawlai   6/19/2019 10:45 AM Artie Snowden  [address] [facility] Maintenance   Topic Date Due    DTaP/Tdap/Td vaccine (1 - Tdap) 04/21/1960    Shingles Vaccine (1 of 2) 04/21/1991    DEXA (modify frequency per FRAX score)  04/21/2006    Pneumococcal 65+ years Vaccine (2 of 2 - PPSV23) 10/27/2016    Potassium monitoring  12/17/2019    Creatinine monitoring  12/17/2019    Flu vaccine  Completed       Hemoglobin A1C (%)   Date Value   12/17/2018 9.8 (H)   06/15/2018 9.4   12/13/2017 7.5 (H)             ( goal A1C is < 7)   No results found for: LABMICR  LDL Cholesterol (mg/dL)   Date Value   06/14/2017 107   04/15/2014 95     LDL Calculated (mg/dL)   Date Value   12/17/2018 93   12/13/2017 78       (goal LDL is <100)   AST (U/L)   Date Value   12/17/2018 23     ALT (U/L)   Date Value   12/17/2018 19     BUN (mg/dL)   Date Value   12/17/2018 16     BP Readings from Last 3 Encounters:   12/19/18 (!) 144/64   09/19/18 134/68   06/15/18 (!) 130/50          (goal 120/80)    All Future Testing planned in [facility]  Lab Frequency Next Occurrence   CBC Auto Differential Once 12/18/2018   Comprehensive Metabolic Panel Once 78/56/0323   Lipid Panel Once 12/18/2018   Hemoglobin A1C Once 12/18/2018   CBC Auto Differential Once 06/19/2019   Comprehensive Metabolic Panel Once 49/88/7065   Lipid Panel Once 06/19/2019   Hemoglobin A1C Once 06/19/2019               Patient Active Problem List:     COPD (chronic obstructive pulmonary disease) ([address]     Hypertension     Dyslipidemia     Osteoporosis     Osteoarthritis     Type 2 diabetes mellitus with diabetic polyneuropathy (HCC)     Abnormal CT of the abdomen     Diverticulitis of intestine without perforation or abscess without bleeding     Duodenal adenoma     Diabetic polyneuropathy associated with type 2 diabetes mellitus ([address]

## 2019-04-30 DIAGNOSIS — M17.11 PRIMARY OSTEOARTHRITIS OF RIGHT KNEE: ICD-10-CM

## 2019-04-30 RX ORDER — OXYCODONE HYDROCHLORIDE AND ACETAMINOPHEN 5; 325 MG/1; MG/1
1 TABLET ORAL EVERY 6 HOURS PRN
Qty: 120 TABLET | Refills: 0 | Status: SHIPPED | OUTPATIENT
Start: 2019-04-30 | End: 2020-06-24

## 2019-04-30 NOTE — TELEPHONE ENCOUNTER
Oarrs  9/19/18    Last visit: 9/19/18    Last Med refill: 3/7/19    Does patient have enough medication for 72 hours: no    Next Visit Date:  Future Appointments   Date Time Provider Jessica Ashraf   6/14/2019 10:45 AM Jessica Jones  [address] [facility] Maintenance   Topic Date Due    DTaP/Tdap/Td vaccine (1 - Tdap) 04/21/1960    Shingles Vaccine (1 of 2) 04/21/1991    DEXA (modify frequency per FRAX score)  04/21/2006    Pneumococcal 65+ years Vaccine (2 of 2 - PPSV23) 10/27/2016    Potassium monitoring  12/17/2019    Creatinine monitoring  12/17/2019    Flu vaccine  Completed       Hemoglobin A1C (%)   Date Value   12/17/2018 9.8 (H)   06/15/2018 9.4   12/13/2017 7.5 (H)             ( goal A1C is < 7)   No results found for: LABMICR  LDL Cholesterol (mg/dL)   Date Value   06/14/2017 107   04/15/2014 95     LDL Calculated (mg/dL)   Date Value   12/17/2018 93   12/13/2017 78       (goal LDL is <100)   AST (U/L)   Date Value   12/17/2018 23     ALT (U/L)   Date Value   12/17/2018 19     BUN (mg/dL)   Date Value   12/17/2018 16     BP Readings from Last 3 Encounters:   12/19/18 (!) 144/64   09/19/18 134/68   06/15/18 (!) 130/50          (goal 120/80)    All Future Testing planned in [facility]  Lab Frequency Next Occurrence   CBC Auto Differential Once 12/18/2018   Comprehensive Metabolic Panel Once 97/08/6085   Lipid Panel Once 12/18/2018   Hemoglobin A1C Once 12/18/2018   CBC Auto Differential Once 06/19/2019   Comprehensive Metabolic Panel Once 37/00/3762   Lipid Panel Once 06/19/2019   Hemoglobin A1C Once 06/19/2019               Patient Active Problem List:     COPD (chronic obstructive pulmonary disease) ([address]     Hypertension     Dyslipidemia     Osteoporosis     Osteoarthritis     Type 2 diabetes mellitus with diabetic polyneuropathy (HCC)     Abnormal CT of the abdomen     Diverticulitis of intestine without perforation or abscess without bleeding     Duodenal adenoma     Diabetic

## 2019-05-15 NOTE — TELEPHONE ENCOUNTER
Next Visit Date:  Future Appointments   Date Time Provider Jessica Chawlai   6/14/2019 10:45 AM Fady Keane  [address] Maintenance   Topic Date Due    DTaP/Tdap/Td vaccine (1 - Tdap) 04/21/1960    Shingles Vaccine (1 of 2) 04/21/1991    DEXA (modify frequency per FRAX score)  04/21/2006    Pneumococcal 65+ years Vaccine (2 of 2 - PPSV23) 10/27/2016    Potassium monitoring  12/17/2019    Creatinine monitoring  12/17/2019    Flu vaccine  Completed       Hemoglobin A1C (%)   Date Value   12/17/2018 9.8 (H)   06/15/2018 9.4   12/13/2017 7.5 (H)             ( goal A1C is < 7)   No results found for: LABMICR  LDL Cholesterol (mg/dL)   Date Value   06/14/2017 107   04/15/2014 95     LDL Calculated (mg/dL)   Date Value   12/17/2018 93   12/13/2017 78       (goal LDL is <100)   AST (U/L)   Date Value   12/17/2018 23     ALT (U/L)   Date Value   12/17/2018 19     BUN (mg/dL)   Date Value   12/17/2018 16     BP Readings from Last 3 Encounters:   12/19/18 (!) 144/64   09/19/18 134/68   06/15/18 (!) 130/50          (goal 120/80)    All Future Testing planned in [facility]  Lab Frequency Next Occurrence   CBC Auto Differential Once 12/18/2018   Comprehensive Metabolic Panel Once 39/18/3373   Lipid Panel Once 12/18/2018   Hemoglobin A1C Once 12/18/2018   CBC Auto Differential Once 06/19/2019   Comprehensive Metabolic Panel Once 07/21/5403   Lipid Panel Once 06/19/2019   Hemoglobin A1C Once 06/19/2019               Patient Active Problem List:     COPD (chronic obstructive pulmonary disease) ([address]     Hypertension     Dyslipidemia     Osteoporosis     Osteoarthritis     Type 2 diabetes mellitus with diabetic polyneuropathy (HCC)     Abnormal CT of the abdomen     Diverticulitis of intestine without perforation or abscess without bleeding     Duodenal adenoma     Diabetic polyneuropathy associated with type 2 diabetes mellitus ([address]

## 2019-06-03 RX ORDER — HUMAN INSULIN 100 [IU]/ML
INJECTION, SUSPENSION SUBCUTANEOUS
Qty: 70 VIAL | Refills: 1 | Status: SHIPPED | OUTPATIENT
Start: 2019-06-03 | End: 2019-10-03 | Stop reason: SDUPTHER

## 2019-06-03 NOTE — TELEPHONE ENCOUNTER
Next Visit Date:  Future Appointments   Date Time Provider Jessica Chawlai   6/14/2019 10:45 AM Fady Keane  [address] Maintenance   Topic Date Due    DTaP/Tdap/Td vaccine (1 - Tdap) 04/21/1960    Shingles Vaccine (1 of 2) 04/21/1991    DEXA (modify frequency per FRAX score)  04/21/2006    Pneumococcal 65+ years Vaccine (2 of 2 - PPSV23) 10/27/2016    Potassium monitoring  12/17/2019    Creatinine monitoring  12/17/2019    Flu vaccine  Completed       Hemoglobin A1C (%)   Date Value   12/17/2018 9.8 (H)   06/15/2018 9.4   12/13/2017 7.5 (H)             ( goal A1C is < 7)   No results found for: LABMICR  LDL Cholesterol (mg/dL)   Date Value   06/14/2017 107   04/15/2014 95     LDL Calculated (mg/dL)   Date Value   12/17/2018 93   12/13/2017 78       (goal LDL is <100)   AST (U/L)   Date Value   12/17/2018 23     ALT (U/L)   Date Value   12/17/2018 19     BUN (mg/dL)   Date Value   12/17/2018 16     BP Readings from Last 3 Encounters:   12/19/18 (!) 144/64   09/19/18 134/68   06/15/18 (!) 130/50          (goal 120/80)    All Future Testing planned in [facility]  Lab Frequency Next Occurrence   CBC Auto Differential Once 12/18/2018   Comprehensive Metabolic Panel Once 47/01/5884   Lipid Panel Once 12/18/2018   Hemoglobin A1C Once 12/18/2018   CBC Auto Differential Once 06/19/2019   Comprehensive Metabolic Panel Once 85/04/4035   Lipid Panel Once 06/19/2019   Hemoglobin A1C Once 06/19/2019               Patient Active Problem List:     COPD (chronic obstructive pulmonary disease) ([address]     Hypertension     Dyslipidemia     Osteoporosis     Osteoarthritis     Type 2 diabetes mellitus with diabetic polyneuropathy (HCC)     Abnormal CT of the abdomen     Diverticulitis of intestine without perforation or abscess without bleeding     Duodenal adenoma     Diabetic polyneuropathy associated with type 2 diabetes mellitus ([address]

## 2019-06-14 ENCOUNTER — OFFICE VISIT (OUTPATIENT)
Dept: FAMILY MEDICINE CLINIC | Age: 79
End: 2019-06-14
Payer: MEDICARE

## 2019-06-14 VITALS
DIASTOLIC BLOOD PRESSURE: 78 MMHG | BODY MASS INDEX: 30.47 KG/M2 | HEART RATE: 96 BPM | SYSTOLIC BLOOD PRESSURE: 136 MMHG | WEIGHT: 166.6 LBS | OXYGEN SATURATION: 95 %

## 2019-06-14 DIAGNOSIS — E11.42 TYPE 2 DIABETES MELLITUS WITH DIABETIC POLYNEUROPATHY, WITH LONG-TERM CURRENT USE OF INSULIN (HCC): Primary | ICD-10-CM

## 2019-06-14 DIAGNOSIS — J44.9 CHRONIC OBSTRUCTIVE PULMONARY DISEASE, UNSPECIFIED COPD TYPE (HCC): ICD-10-CM

## 2019-06-14 DIAGNOSIS — E78.5 DYSLIPIDEMIA: ICD-10-CM

## 2019-06-14 DIAGNOSIS — I10 ESSENTIAL HYPERTENSION: ICD-10-CM

## 2019-06-14 DIAGNOSIS — Z79.4 TYPE 2 DIABETES MELLITUS WITH DIABETIC POLYNEUROPATHY, WITH LONG-TERM CURRENT USE OF INSULIN (HCC): Primary | ICD-10-CM

## 2019-06-14 LAB — HBA1C MFR BLD: 8.5 %

## 2019-06-14 PROCEDURE — G8926 SPIRO NO PERF OR DOC: HCPCS | Performed by: FAMILY MEDICINE

## 2019-06-14 PROCEDURE — G8427 DOCREV CUR MEDS BY ELIG CLIN: HCPCS | Performed by: FAMILY MEDICINE

## 2019-06-14 PROCEDURE — 4040F PNEUMOC VAC/ADMIN/RCVD: CPT | Performed by: FAMILY MEDICINE

## 2019-06-14 PROCEDURE — 3023F SPIROM DOC REV: CPT | Performed by: FAMILY MEDICINE

## 2019-06-14 PROCEDURE — 1036F TOBACCO NON-USER: CPT | Performed by: FAMILY MEDICINE

## 2019-06-14 PROCEDURE — G8400 PT W/DXA NO RESULTS DOC: HCPCS | Performed by: FAMILY MEDICINE

## 2019-06-14 PROCEDURE — G8417 CALC BMI ABV UP PARAM F/U: HCPCS | Performed by: FAMILY MEDICINE

## 2019-06-14 PROCEDURE — 1090F PRES/ABSN URINE INCON ASSESS: CPT | Performed by: FAMILY MEDICINE

## 2019-06-14 PROCEDURE — 83036 HEMOGLOBIN GLYCOSYLATED A1C: CPT | Performed by: FAMILY MEDICINE

## 2019-06-14 PROCEDURE — 99213 OFFICE O/P EST LOW 20 MIN: CPT | Performed by: FAMILY MEDICINE

## 2019-06-14 PROCEDURE — 1123F ACP DISCUSS/DSCN MKR DOCD: CPT | Performed by: FAMILY MEDICINE

## 2019-06-14 ASSESSMENT — ENCOUNTER SYMPTOMS
VOMITING: 0
COUGH: 0
SHORTNESS OF BREATH: 0
NAUSEA: 0
BACK PAIN: 1
SINUS PRESSURE: 0
DIARRHEA: 0
SORE THROAT: 0
COLOR CHANGE: 1

## 2019-06-14 ASSESSMENT — PATIENT HEALTH QUESTIONNAIRE - PHQ9
1. LITTLE INTEREST OR PLEASURE IN DOING THINGS: 0
SUM OF ALL RESPONSES TO PHQ QUESTIONS 1-9: 0
SUM OF ALL RESPONSES TO PHQ QUESTIONS 1-9: 0
2. FEELING DOWN, DEPRESSED OR HOPELESS: 0
SUM OF ALL RESPONSES TO PHQ9 QUESTIONS 1 & 2: 0

## 2019-06-14 NOTE — PROGRESS NOTES
of where    DE EGD REMOVAL TUMOR POLYP/OTHER LESION SNARE TECH N/A 4/3/2017    EGD POLYP SNARE performed by Shelly Palomares MD at [address] [place] [address] Street  04/03/2017    In the 2nd part of the duodenum there is a 3-4 cm mass lesion seen, occupies about half of the lumen-pathology--adenoma     Family History   Problem Relation Age of Onset    Heart Disease Mother     No Known Problems Father      Social History     Tobacco Use    Smoking status: Former Smoker     Packs/day: 1.00     Years: 27.00     Pack years: 27.00     Types: Cigarettes     Last attempt to quit: 10/15/2003     Years since quitting: 15.6    Smokeless tobacco: Never Used   Substance Use Topics    Alcohol use: No      Current Outpatient Medications   Medication Sig Dispense Refill    NOVOLIN 70/30 RELION (70-30) 100 UNIT/ML injection vial INJECT 80 UNITS SUBCUTANEOUSLY IN THE MORNING AND 50 UNITS IN THE EVENING 70 vial 1    blood glucose test strips (TRUE METRIX BLOOD GLUCOSE TEST) strip USE TO TEST TWICE DAILY 100 strip 11    atorvastatin (LIPITOR) 10 MG tablet TAKE 1 TABLET EVERY DAY 90 tablet 3    lisinopril (PRINIVIL;ZESTRIL) 10 MG tablet TAKE 1 TABLET EVERY DAY 90 tablet 3    ketoconazole (NIZORAL) 2 % shampoo Apply topically daily as needed. 120 mL 5    fluocinolone acetonide (SYNALAR) 0.01 % external solution Apply topically once daily. 1 Bottle 0    meclizine (ANTIVERT) 12.5 MG tablet TAKE 1 TABLET THREE TIMES DAILY AS NEEDED 270 tablet 1    MONOJECT INS SYR 1CC/30G 30G X 5/16\" 1 ML MISC USE TO INJECT TWICE DAILY 200 each 5    aspirin 325 MG tablet Take 325 mg by mouth daily.  naproxen sodium (ALEVE) 220 MG tablet Take 220 mg by mouth 2 times daily as needed.  gabapentin (NEURONTIN) 300 MG capsule TAKE 1 CAPSULE BY MOUTH THREE TIMES DAILY 90 capsule 3    gabapentin (NEURONTIN) 300 MG capsule 11/20/18TAKE 1 CAPSULE BY MOUTH THREE TIMES DAILY.  90 capsule 5     No current facility-administered medications for this visit. Allergies   Allergen Reactions    Nubain [Nalbuphine Hcl]     Vistaril [Hydroxyzine Hcl] Nausea And Vomiting         Subjective:   Review of Systems   Constitutional: Negative for chills, diaphoresis and fever. HENT: Negative for congestion, sinus pressure and sore throat. Eyes: Negative for visual disturbance. Respiratory: Negative for cough and shortness of breath. Cardiovascular: Negative for chest pain and leg swelling. Gastrointestinal: Negative for diarrhea, nausea and vomiting. Genitourinary: Negative for dysuria, menstrual problem, urgency and vaginal discharge. Musculoskeletal: Positive for arthralgias and back pain. Negative for myalgias. Skin: Positive for color change. Negative for rash. Neurological: Negative for weakness, numbness and headaches. Psychiatric/Behavioral: Positive for decreased concentration. Negative for sleep disturbance. The patient is nervous/anxious.        :   /78   Pulse 96   Wt 166 lb 9.6 oz (75.6 kg)   SpO2 95%   BMI 30.47 kg/m²     Physical Exam   Constitutional: She is oriented to person, place, and time. She appears well-developed and well-nourished. No distress. HENT:   Head: Normocephalic and atraumatic. Mouth/Throat: No oropharyngeal exudate. Eyes: No scleral icterus. Neck: Neck supple. Carotid bruit is not present. Cardiovascular: Exam reveals no gallop and no friction rub. No murmur heard. Pulmonary/Chest: No respiratory distress. She has no wheezes. She has no rales. She exhibits no tenderness. Musculoskeletal: She exhibits no edema. Lymphadenopathy:     She has no cervical adenopathy. Neurological: She is alert and oriented to person, place, and time. No cranial nerve deficit. Skin: Lesion (SK right hip frozen with liquid nitrogen) noted. No rash noted. She is not diaphoretic. Psychiatric: She has a normal mood and affect.  Her behavior is normal. Judgment and thought content normal.       Assessment:       Diagnosis Orders   1. Type 2 diabetes mellitus with diabetic polyneuropathy, with long-term current use of insulin ([facility])  POCT glycosylated hemoglobin (Hb A1C)    Comprehensive Metabolic Panel    Lipid Panel    Hemoglobin A1C   2. Essential hypertension  CBC Auto Differential    Comprehensive Metabolic Panel    Lipid Panel   3. Dyslipidemia  Comprehensive Metabolic Panel    Lipid Panel   4. Chronic obstructive pulmonary disease, unspecified COPD type ([facility]  Comprehensive Metabolic Panel    Lipid Panel         Plan:      Return in about 6 months (around 12/14/2019). Orders Placed This Encounter   Procedures    CBC Auto Differential     Standing Status:   Future     Standing Expiration Date:   6/14/2020    Comprehensive Metabolic Panel     Standing Status:   Future     Standing Expiration Date:   6/14/2020    Lipid Panel     Standing Status:   Future     Standing Expiration Date:   6/14/2020     Order Specific Question:   Is Patient Fasting?/# of Hours     Answer:   12 hour fast    Hemoglobin A1C     Standing Status:   Future     Standing Expiration Date:   6/14/2020    POCT glycosylated hemoglobin (Hb A1C)     No orders of the defined types were placed in this encounter. Above labs prior to her next 6 month follow up appt.

## 2019-07-05 RX ORDER — GABAPENTIN 300 MG/1
CAPSULE ORAL
Qty: 90 CAPSULE | Refills: 0 | Status: SHIPPED | OUTPATIENT
Start: 2019-07-05 | End: 2019-08-05 | Stop reason: SDUPTHER

## 2019-07-09 ENCOUNTER — TELEPHONE (OUTPATIENT)
Dept: FAMILY MEDICINE CLINIC | Age: 79
End: 2019-07-09

## 2019-08-05 RX ORDER — GABAPENTIN 300 MG/1
CAPSULE ORAL
Qty: 90 CAPSULE | Refills: 0 | Status: SHIPPED | OUTPATIENT
Start: 2019-08-05 | End: 2019-09-01 | Stop reason: SDUPTHER

## 2019-09-02 RX ORDER — GABAPENTIN 300 MG/1
CAPSULE ORAL
Qty: 90 CAPSULE | Refills: 0 | Status: SHIPPED | OUTPATIENT
Start: 2019-09-02 | End: 2019-10-16 | Stop reason: SDUPTHER

## 2019-09-20 RX ORDER — MECLIZINE HCL 12.5 MG/1
TABLET ORAL
Qty: 270 TABLET | Refills: 1 | Status: SHIPPED | OUTPATIENT
Start: 2019-09-20 | End: 2020-02-06

## 2019-10-03 RX ORDER — HUMAN INSULIN 100 [IU]/ML
INJECTION, SUSPENSION SUBCUTANEOUS
Qty: 5 VIAL | Refills: 3 | Status: SHIPPED | OUTPATIENT
Start: 2019-10-03 | End: 2020-03-02 | Stop reason: SDUPTHER

## 2019-10-16 RX ORDER — GABAPENTIN 300 MG/1
CAPSULE ORAL
Qty: 90 CAPSULE | Refills: 0 | Status: SHIPPED | OUTPATIENT
Start: 2019-10-16 | End: 2019-11-20 | Stop reason: SDUPTHER

## 2019-11-01 RX ORDER — HUMAN INSULIN 100 [IU]/ML
INJECTION, SUSPENSION SUBCUTANEOUS
Qty: 1 VIAL | Refills: 3 | Status: SHIPPED | OUTPATIENT
Start: 2019-11-01 | End: 2019-12-16 | Stop reason: SDUPTHER

## 2019-11-05 NOTE — TELEPHONE ENCOUNTER
If this is identical and insulin then I am ok  But all insulin needs Rx so what exactly are we talking about? 36.5

## 2019-11-20 RX ORDER — GABAPENTIN 300 MG/1
CAPSULE ORAL
Qty: 90 CAPSULE | Refills: 0 | Status: SHIPPED | OUTPATIENT
Start: 2019-11-20 | End: 2020-02-07

## 2019-12-09 ENCOUNTER — TELEPHONE (OUTPATIENT)
Dept: FAMILY MEDICINE CLINIC | Age: 79
End: 2019-12-09

## 2019-12-09 RX ORDER — ORPHENADRINE CITRATE 100 MG/1
100 TABLET, EXTENDED RELEASE ORAL 2 TIMES DAILY
Qty: 20 TABLET | Refills: 0 | Status: SHIPPED | OUTPATIENT
Start: 2019-12-09 | End: 2019-12-16 | Stop reason: ALTCHOICE

## 2019-12-12 LAB
ABSOLUTE BASO #: 0.1 X10E9/L (ref 0–0.9)
ABSOLUTE EOS #: 0 X10E9/L (ref 0–0.4)
ABSOLUTE LYMPH #: 1.7 X10E9/L (ref 1–3.5)
ABSOLUTE MONO #: 0.4 X10E9/L (ref 0–0.9)
ABSOLUTE NEUT #: 10.2 X10E9/L (ref 1.5–6.6)
ALBUMIN SERPL-MCNC: 3.8 G/DL (ref 3.2–5.3)
ALK PHOSPHATASE: 143 U/L (ref 39–130)
ALT SERPL-CCNC: 23 U/L (ref 0–31)
ANION GAP SERPL CALCULATED.3IONS-SCNC: 10 MMOL/L (ref 4–12)
AST SERPL-CCNC: 24 U/L (ref 0–41)
AVERAGE GLUCOSE: 200 MG/DL
BASOPHILS RELATIVE PERCENT: 0.5 %
BILIRUB SERPL-MCNC: 0.4 MG/DL (ref 0.3–1.2)
BUN BLDV-MCNC: 11 MG/DL (ref 5–27)
CALCIUM SERPL-MCNC: 9 MG/DL (ref 8.5–10.5)
CHLORIDE BLD-SCNC: 100 MMOL/L (ref 98–109)
CHOLESTEROL/HDL RATIO: 2.4 (ref 1–5)
CHOLESTEROL: 151 MG/DL (ref 150–200)
CO2: 30 MMOL/L (ref 22–32)
CREAT SERPL-MCNC: 0.57 MG/DL (ref 0.4–1)
EGFR AFRICAN AMERICAN: >60 ML/MIN/1.73SQ.M
EGFR IF NONAFRICAN AMERICAN: >60 ML/MIN/1.73SQ.M
EOSINOPHILS RELATIVE PERCENT: 0.4 %
GLUCOSE: 158 MG/DL (ref 65–99)
HBA1C MFR BLD: 8.6 % (ref 4.4–6.4)
HCT VFR BLD CALC: 28.2 % (ref 34–48)
HDLC SERPL-MCNC: 64 MG/DL
HEMOGLOBIN: 8.6 G/DL (ref 11.7–16.1)
LDL CHOLESTEROL CALCULATED: 76 MG/DL
LDL/HDL RATIO: 1.2
LYMPHOCYTE %: 13.3 %
MCH RBC QN AUTO: 22.6 PG (ref 27–35)
MCHC RBC AUTO-ENTMCNC: 30.6 G/DL (ref 31–36)
MCV RBC AUTO: 74 FL (ref 81–101)
MONOCYTES # BLD: 3.4 %
NEUTROPHILS RELATIVE PERCENT: 82.4 %
PDW BLD-RTO: 18.3 % (ref 11.5–14.7)
PLATELETS: 409 X10E9/L (ref 150–450)
PMV BLD AUTO: 7.8 FL (ref 7–12)
POTASSIUM SERPL-SCNC: 4.4 MMOL/L (ref 3.5–5)
RBC: 3.81 X10E12/L (ref 3.3–5.5)
SODIUM BLD-SCNC: 140 MMOL/L (ref 134–146)
TOTAL PROTEIN: 7 G/DL (ref 6–8)
TRIGL SERPL-MCNC: 56 MG/DL (ref 27–150)
VLDLC SERPL CALC-MCNC: 11 MG/DL (ref 0–30)
WBC: 12.4 X10E9/L (ref 4–11.8)

## 2019-12-16 ENCOUNTER — OFFICE VISIT (OUTPATIENT)
Dept: FAMILY MEDICINE CLINIC | Age: 79
End: 2019-12-16
Payer: MEDICARE

## 2019-12-16 VITALS
SYSTOLIC BLOOD PRESSURE: 140 MMHG | OXYGEN SATURATION: 98 % | WEIGHT: 163.2 LBS | DIASTOLIC BLOOD PRESSURE: 60 MMHG | BODY MASS INDEX: 29.85 KG/M2 | HEART RATE: 97 BPM

## 2019-12-16 DIAGNOSIS — I10 ESSENTIAL HYPERTENSION: ICD-10-CM

## 2019-12-16 DIAGNOSIS — D50.9 MICROCYTIC ANEMIA: Primary | ICD-10-CM

## 2019-12-16 DIAGNOSIS — Z79.4 TYPE 2 DIABETES MELLITUS WITH DIABETIC POLYNEUROPATHY, WITH LONG-TERM CURRENT USE OF INSULIN (HCC): ICD-10-CM

## 2019-12-16 DIAGNOSIS — Z23 IMMUNIZATION DUE: ICD-10-CM

## 2019-12-16 DIAGNOSIS — E11.42 TYPE 2 DIABETES MELLITUS WITH DIABETIC POLYNEUROPATHY, WITH LONG-TERM CURRENT USE OF INSULIN (HCC): ICD-10-CM

## 2019-12-16 PROCEDURE — 4040F PNEUMOC VAC/ADMIN/RCVD: CPT | Performed by: FAMILY MEDICINE

## 2019-12-16 PROCEDURE — 99214 OFFICE O/P EST MOD 30 MIN: CPT | Performed by: FAMILY MEDICINE

## 2019-12-16 PROCEDURE — 1036F TOBACCO NON-USER: CPT | Performed by: FAMILY MEDICINE

## 2019-12-16 PROCEDURE — 1090F PRES/ABSN URINE INCON ASSESS: CPT | Performed by: FAMILY MEDICINE

## 2019-12-16 PROCEDURE — G8400 PT W/DXA NO RESULTS DOC: HCPCS | Performed by: FAMILY MEDICINE

## 2019-12-16 PROCEDURE — 90653 IIV ADJUVANT VACCINE IM: CPT | Performed by: FAMILY MEDICINE

## 2019-12-16 PROCEDURE — G8482 FLU IMMUNIZE ORDER/ADMIN: HCPCS | Performed by: FAMILY MEDICINE

## 2019-12-16 PROCEDURE — G0008 ADMIN INFLUENZA VIRUS VAC: HCPCS | Performed by: FAMILY MEDICINE

## 2019-12-16 PROCEDURE — G8427 DOCREV CUR MEDS BY ELIG CLIN: HCPCS | Performed by: FAMILY MEDICINE

## 2019-12-16 PROCEDURE — 1123F ACP DISCUSS/DSCN MKR DOCD: CPT | Performed by: FAMILY MEDICINE

## 2019-12-16 PROCEDURE — G8417 CALC BMI ABV UP PARAM F/U: HCPCS | Performed by: FAMILY MEDICINE

## 2019-12-16 RX ORDER — OMEPRAZOLE 20 MG/1
20 CAPSULE, DELAYED RELEASE ORAL
Qty: 30 CAPSULE | Refills: 5 | Status: SHIPPED | OUTPATIENT
Start: 2019-12-16 | End: 2020-06-24 | Stop reason: ALTCHOICE

## 2019-12-16 ASSESSMENT — ENCOUNTER SYMPTOMS
BACK PAIN: 0
SHORTNESS OF BREATH: 0
SINUS PRESSURE: 0
ANAL BLEEDING: 0
COUGH: 0
SORE THROAT: 0
BLOOD IN STOOL: 0
VOMITING: 0
DIARRHEA: 0
NAUSEA: 0

## 2019-12-17 LAB
CONTROL: PRESENT
HEMOCCULT STL QL: POSITIVE

## 2019-12-17 PROCEDURE — 82274 ASSAY TEST FOR BLOOD FECAL: CPT | Performed by: FAMILY MEDICINE

## 2019-12-23 DIAGNOSIS — R19.5 POSITIVE FIT (FECAL IMMUNOCHEMICAL TEST): ICD-10-CM

## 2019-12-23 DIAGNOSIS — D50.9 MICROCYTIC ANEMIA: Primary | ICD-10-CM

## 2019-12-23 LAB
ABSOLUTE BASO #: 0.1 X10E9/L (ref 0–0.9)
ABSOLUTE EOS #: 0.2 X10E9/L (ref 0–0.4)
ABSOLUTE LYMPH #: 2.2 X10E9/L (ref 1–3.5)
ABSOLUTE MONO #: 0.9 X10E9/L (ref 0–0.9)
ABSOLUTE NEUT #: 9.2 X10E9/L (ref 1.5–6.6)
BASOPHILS RELATIVE PERCENT: 0.6 %
EOSINOPHILS RELATIVE PERCENT: 1.7 %
FERRITIN: 7 NG/ML (ref 11–307)
FOLATE: 20.4 NG/ML
HCT VFR BLD CALC: 24.6 % (ref 34–48)
HEMOGLOBIN: 7.9 G/DL (ref 11.7–16.1)
IRON SATURATION: 3 % SATURATION (ref 15–50)
IRON, SERUM: 12 UG/DL (ref 50–170)
LYMPHOCYTE %: 17.3 %
MCH RBC QN AUTO: 23 PG (ref 27–35)
MCHC RBC AUTO-ENTMCNC: 31.9 G/DL (ref 31–36)
MCV RBC AUTO: 72 FL (ref 81–101)
MONOCYTES # BLD: 7.1 %
NEUTROPHILS RELATIVE PERCENT: 73.3 %
PDW BLD-RTO: 18.8 % (ref 11.5–14.7)
PLATELETS: 644 X10E9/L (ref 150–450)
PMV BLD AUTO: 7.6 FL (ref 7–12)
RBC: 3.42 X10E12/L (ref 3.3–5.5)
TOTAL IRON BINDING CAPACITY: 448 UG/DL (ref 250–425)
VITAMIN B-12: 212 PG/ML (ref 180–914)
WBC: 12.5 X10E9/L (ref 4–11.8)

## 2019-12-27 RX ORDER — SYRINGE AND NEEDLE,INSULIN,1ML 30 GX5/16"
SYRINGE, EMPTY DISPOSABLE MISCELLANEOUS
Qty: 200 EACH | Refills: 5 | Status: SHIPPED | OUTPATIENT
Start: 2019-12-27 | End: 2021-02-18

## 2020-01-10 ENCOUNTER — TELEPHONE (OUTPATIENT)
Dept: FAMILY MEDICINE CLINIC | Age: 80
End: 2020-01-10

## 2020-01-10 NOTE — TELEPHONE ENCOUNTER
GI appt 2/ 7/20. She is taking 65 mgs. iron  Pills 2 pills twice a day. She is also taking Omeprazole 20 mgs.

## 2020-01-10 NOTE — TELEPHONE ENCOUNTER
[id number] Ariadna Batista since it has been some time since last cbc check and still few weeks for GI appt. I would like her to have CBC checked next week to make sure numbers are not getting worse.

## 2020-01-10 NOTE — TELEPHONE ENCOUNTER
Made patient aware she verbally understood and will go to path [facility] [phone] we need the fax number to fax

## 2020-01-13 LAB
ABSOLUTE BASO #: 0.1 X10E9/L (ref 0–0.9)
ABSOLUTE EOS #: 0.1 X10E9/L (ref 0–0.4)
ABSOLUTE LYMPH #: 2.4 X10E9/L (ref 1–3.5)
ABSOLUTE MONO #: 0.6 X10E9/L (ref 0–0.9)
ABSOLUTE NEUT #: 6.2 X10E9/L (ref 1.5–6.6)
BASOPHILS RELATIVE PERCENT: 0.8 %
EOSINOPHILS RELATIVE PERCENT: 1.5 %
HCT VFR BLD CALC: 28.8 % (ref 34–48)
HEMOGLOBIN: 8.7 G/DL (ref 11.7–16.1)
LYMPHOCYTE %: 25.6 %
MCH RBC QN AUTO: 23 PG (ref 27–35)
MCHC RBC AUTO-ENTMCNC: 30.2 G/DL (ref 31–36)
MCV RBC AUTO: 76 FL (ref 81–101)
MONOCYTES # BLD: 6.4 %
NEUTROPHILS RELATIVE PERCENT: 65.7 %
PDW BLD-RTO: 24.8 % (ref 11.5–14.7)
PLATELETS: 426 X10E9/L (ref 150–450)
PMV BLD AUTO: 8.4 FL (ref 7–12)
RBC: 3.79 X10E12/L (ref 3.3–5.5)
WBC: 9.5 X10E9/L (ref 4–11.8)

## 2020-02-06 RX ORDER — MECLIZINE HCL 12.5 MG/1
TABLET ORAL
Qty: 270 TABLET | Refills: 1 | Status: SHIPPED | OUTPATIENT
Start: 2020-02-06 | End: 2020-06-26

## 2020-02-06 RX ORDER — LISINOPRIL 10 MG/1
TABLET ORAL
Qty: 90 TABLET | Refills: 3 | Status: SHIPPED | OUTPATIENT
Start: 2020-02-06 | End: 2021-02-18

## 2020-02-06 RX ORDER — ATORVASTATIN CALCIUM 10 MG/1
TABLET, FILM COATED ORAL
Qty: 90 TABLET | Refills: 3 | Status: SHIPPED | OUTPATIENT
Start: 2020-02-06 | End: 2021-02-18 | Stop reason: SDUPTHER

## 2020-02-06 NOTE — TELEPHONE ENCOUNTER
Osteoarthritis     Type 2 diabetes mellitus with diabetic polyneuropathy (HCC)     Abnormal CT of the abdomen     Diverticulitis of intestine without perforation or abscess without bleeding     Duodenal adenoma     Diabetic polyneuropathy associated with type 2 diabetes mellitus ([facility]

## 2020-02-07 ENCOUNTER — OFFICE VISIT (OUTPATIENT)
Dept: GASTROENTEROLOGY | Age: 80
End: 2020-02-07
Payer: MEDICARE

## 2020-02-07 ENCOUNTER — HOSPITAL ENCOUNTER (OUTPATIENT)
Age: 80
Setting detail: SPECIMEN
Discharge: HOME | End: 2020-02-07
Payer: MEDICARE

## 2020-02-07 VITALS
DIASTOLIC BLOOD PRESSURE: 65 MMHG | HEART RATE: 102 BPM | WEIGHT: 160.6 LBS | SYSTOLIC BLOOD PRESSURE: 138 MMHG | BODY MASS INDEX: 29.37 KG/M2

## 2020-02-07 LAB
HCT VFR BLD CALC: 34.6 % (ref 36.3–47.1)
HEMOGLOBIN: 9.9 G/DL (ref 11.9–15.1)
MCH RBC QN AUTO: 26.3 PG (ref 25.2–33.5)
MCHC RBC AUTO-ENTMCNC: 28.6 G/DL (ref 28.4–34.8)
MCV RBC AUTO: 92 FL (ref 82.6–102.9)
NRBC AUTOMATED: 0 PER 100 WBC
PDW BLD-RTO: 24.6 % (ref 11.8–14.4)
PLATELET # BLD: 432 K/UL (ref 138–453)
PMV BLD AUTO: 10.6 FL (ref 8.1–13.5)
RBC # BLD: 3.76 M/UL (ref 3.95–5.11)
WBC # BLD: 10.8 K/UL (ref 3.5–11.3)

## 2020-02-07 PROCEDURE — G8427 DOCREV CUR MEDS BY ELIG CLIN: HCPCS | Performed by: INTERNAL MEDICINE

## 2020-02-07 PROCEDURE — G8482 FLU IMMUNIZE ORDER/ADMIN: HCPCS | Performed by: INTERNAL MEDICINE

## 2020-02-07 PROCEDURE — 99214 OFFICE O/P EST MOD 30 MIN: CPT | Performed by: INTERNAL MEDICINE

## 2020-02-07 PROCEDURE — 1090F PRES/ABSN URINE INCON ASSESS: CPT | Performed by: INTERNAL MEDICINE

## 2020-02-07 PROCEDURE — G8417 CALC BMI ABV UP PARAM F/U: HCPCS | Performed by: INTERNAL MEDICINE

## 2020-02-07 ASSESSMENT — ENCOUNTER SYMPTOMS
COUGH: 0
VOMITING: 0
DIARRHEA: 0
RECTAL PAIN: 0
SORE THROAT: 0
ABDOMINAL PAIN: 0
CHOKING: 0
BACK PAIN: 1
NAUSEA: 0
TROUBLE SWALLOWING: 0
SINUS PRESSURE: 0
ANAL BLEEDING: 1
WHEEZING: 0
CONSTIPATION: 0
BLOOD IN STOOL: 1
VOICE CHANGE: 0
ABDOMINAL DISTENTION: 0

## 2020-02-07 NOTE — PROGRESS NOTES
Subjective:      Patient ID: Rubi Danielle is a 67 y.o. female. HPI  Dr. Nataly Contreras MD has requested that I see Rubi Danielle for a consult for No diagnosis found. .  Patient seen with a history of anemia. Last time this patient was seen by me was about 3 years ago. Patient states that she was feeling weak tired. She had a labs done and was told that she has anemia. Also patient was placed on iron therapy and she has dark stools. She denies obvious hematochezia. No history of dysphagia or dyspepsia. No history of NSAID use. Does not take anticoagulation therapy. No GERD symptoms. Denies abdominal pain, bloating, nausea vomiting. Bowel movements satisfactory. Has good appetite and there is no weight loss. Her previous records reviewed. Patient had EGD done in April 2017 and was found to have polypoid lesion in the duodenum. Biopsies revealed adenoma. Patient was referred to [place] [address] for further evaluation and management of this lesion. However it appears that patient did not follow the advice. At that time discussed with Dr. Kassie Pierre, her family physician and also patient's son regarding this lesion and management. Patient did not follow the advice. Also it appears Dr. Kusum Riddle discussed with the patient regarding the lesion and further management, patient refused to have further intervention. .  On chart review, looks like patient did not want ` to have further intervention regarding this duodenal lesion. Other labs that are done recently reviewed. Iron level is low. She has anemia. Past Medical, Family, and Social History reviewed and does contribute to the patient presenting condition. patient\"s PMH/PSH,SH,PSYCH hx, MEDs, ALLERGIES, and ROS was all reviewed and updated ion the appropriate sections    Review of Systems   Constitutional: Positive for fatigue. Negative for appetite change and unexpected weight change.    HENT: Negative for dental problem, result negative. Musculoskeletal:         General: No tenderness. Comments: No joint swelling   Lymphadenopathy:      Cervical: No cervical adenopathy. Skin:     General: Skin is warm. Findings: No bruising, ecchymosis, erythema or rash. Neurological:      Mental Status: She is alert and oriented to person, place, and time. Cranial Nerves: No cranial nerve deficit. Psychiatric:         Thought Content: Thought content normal.         Assessment:       Diagnosis Orders   1. Melena  CBC    EGD           Plan:      Patient looks stable. Her stool is positive for occult blood. I have a suspicion that the lesion in the duodenum may be responsible for her anemia and GI blood loss. However, colonic pathology need to be evaluated as well. Patient did not have colonoscopy for more than 15 years. Patient is advised to have CBC done. She may need EGD and this will be arranged. Basing on the results of this EGD will decide whether she needs colonoscopy. After discussion, patient understood the procedure, risks and benefits and verbalized the consent.

## 2020-02-10 ENCOUNTER — TELEPHONE (OUTPATIENT)
Dept: FAMILY MEDICINE CLINIC | Age: 80
End: 2020-02-10

## 2020-02-10 NOTE — TELEPHONE ENCOUNTER
Pt is calling to say that she is having an EGD on Thursday at 12:15 pm.  She will by NPO after midnight on the night before. She was told she can take the lipitor the morning of the procedure. she wants to know how much insulin to take that morning?

## 2020-02-10 NOTE — TELEPHONE ENCOUNTER
I would take normal pm dose night before. Check glucose morning of procedure and if under 200 hold that am dose. If over 201 take 25% of normal am dosage.

## 2020-02-12 ENCOUNTER — ANESTHESIA EVENT (OUTPATIENT)
Dept: OPERATING ROOM | Age: 80
End: 2020-02-12
Payer: MEDICARE

## 2020-02-13 ENCOUNTER — ANESTHESIA (OUTPATIENT)
Dept: OPERATING ROOM | Age: 80
End: 2020-02-13
Payer: MEDICARE

## 2020-02-13 ENCOUNTER — HOSPITAL ENCOUNTER (OUTPATIENT)
Age: 80
Setting detail: OUTPATIENT SURGERY
Discharge: HOME | End: 2020-02-13
Attending: INTERNAL MEDICINE | Admitting: INTERNAL MEDICINE
Payer: MEDICARE

## 2020-02-13 VITALS
DIASTOLIC BLOOD PRESSURE: 70 MMHG | OXYGEN SATURATION: 98 % | SYSTOLIC BLOOD PRESSURE: 162 MMHG | HEART RATE: 88 BPM | RESPIRATION RATE: 20 BRPM | BODY MASS INDEX: 29.77 KG/M2 | WEIGHT: 157.7 LBS | TEMPERATURE: 97.2 F | HEIGHT: 61 IN

## 2020-02-13 VITALS — SYSTOLIC BLOOD PRESSURE: 157 MMHG | OXYGEN SATURATION: 100 % | DIASTOLIC BLOOD PRESSURE: 73 MMHG

## 2020-02-13 LAB
GLUCOSE BLD-MCNC: 174 MG/DL (ref 65–105)
GLUCOSE BLD-MCNC: 174 MG/DL (ref 65–105)
HCT VFR BLD CALC: 34 % (ref 36.3–47.1)
HEMOGLOBIN: 9.8 G/DL (ref 11.9–15.1)
MCH RBC QN AUTO: 26.9 PG (ref 25.2–33.5)
MCHC RBC AUTO-ENTMCNC: 28.8 G/DL (ref 28.4–34.8)
MCV RBC AUTO: 93.4 FL (ref 82.6–102.9)
NRBC AUTOMATED: 0 PER 100 WBC
PDW BLD-RTO: 22.4 % (ref 11.8–14.4)
PLATELET # BLD: 330 K/UL (ref 138–453)
PMV BLD AUTO: 9.6 FL (ref 8.1–13.5)
RBC # BLD: 3.64 M/UL (ref 3.95–5.11)
WBC # BLD: 8.5 K/UL (ref 3.5–11.3)

## 2020-02-13 PROCEDURE — 85027 COMPLETE CBC AUTOMATED: CPT

## 2020-02-13 PROCEDURE — 7100000010 HC PHASE II RECOVERY - FIRST 15 MIN: Performed by: INTERNAL MEDICINE

## 2020-02-13 PROCEDURE — 2580000003 HC RX 258: Performed by: NURSE ANESTHETIST, CERTIFIED REGISTERED

## 2020-02-13 PROCEDURE — 3700000000 HC ANESTHESIA ATTENDED CARE: Performed by: INTERNAL MEDICINE

## 2020-02-13 PROCEDURE — 3609012400 HC EGD TRANSORAL BIOPSY SINGLE/MULTIPLE: Performed by: INTERNAL MEDICINE

## 2020-02-13 PROCEDURE — 7100000011 HC PHASE II RECOVERY - ADDTL 15 MIN: Performed by: INTERNAL MEDICINE

## 2020-02-13 PROCEDURE — 88305 TISSUE EXAM BY PATHOLOGIST: CPT

## 2020-02-13 PROCEDURE — 3700000001 HC ADD 15 MINUTES (ANESTHESIA): Performed by: INTERNAL MEDICINE

## 2020-02-13 PROCEDURE — 2580000003 HC RX 258: Performed by: ANESTHESIOLOGY

## 2020-02-13 PROCEDURE — 2709999900 HC NON-CHARGEABLE SUPPLY: Performed by: INTERNAL MEDICINE

## 2020-02-13 PROCEDURE — 43239 EGD BIOPSY SINGLE/MULTIPLE: CPT | Performed by: INTERNAL MEDICINE

## 2020-02-13 PROCEDURE — 36415 COLL VENOUS BLD VENIPUNCTURE: CPT

## 2020-02-13 PROCEDURE — 82947 ASSAY GLUCOSE BLOOD QUANT: CPT

## 2020-02-13 PROCEDURE — 2500000003 HC RX 250 WO HCPCS: Performed by: NURSE ANESTHETIST, CERTIFIED REGISTERED

## 2020-02-13 PROCEDURE — 6360000002 HC RX W HCPCS: Performed by: NURSE ANESTHETIST, CERTIFIED REGISTERED

## 2020-02-13 RX ORDER — SODIUM CHLORIDE 0.9 % (FLUSH) 0.9 %
10 SYRINGE (ML) INJECTION EVERY 12 HOURS SCHEDULED
Status: DISCONTINUED | OUTPATIENT
Start: 2020-02-13 | End: 2020-02-13 | Stop reason: HOSPADM

## 2020-02-13 RX ORDER — SODIUM CHLORIDE, SODIUM LACTATE, POTASSIUM CHLORIDE, CALCIUM CHLORIDE 600; 310; 30; 20 MG/100ML; MG/100ML; MG/100ML; MG/100ML
INJECTION, SOLUTION INTRAVENOUS CONTINUOUS PRN
Status: DISCONTINUED | OUTPATIENT
Start: 2020-02-13 | End: 2020-02-13 | Stop reason: SDUPTHER

## 2020-02-13 RX ORDER — ATORVASTATIN CALCIUM 10 MG/1
10 TABLET, FILM COATED ORAL DAILY
Status: ON HOLD | COMMUNITY
Start: 2020-02-06 | End: 2020-02-13

## 2020-02-13 RX ORDER — LIDOCAINE HYDROCHLORIDE 20 MG/ML
INJECTION, SOLUTION EPIDURAL; INFILTRATION; INTRACAUDAL; PERINEURAL PRN
Status: DISCONTINUED | OUTPATIENT
Start: 2020-02-13 | End: 2020-02-13 | Stop reason: SDUPTHER

## 2020-02-13 RX ORDER — SODIUM CHLORIDE 0.9 % (FLUSH) 0.9 %
10 SYRINGE (ML) INJECTION PRN
Status: DISCONTINUED | OUTPATIENT
Start: 2020-02-13 | End: 2020-02-13 | Stop reason: HOSPADM

## 2020-02-13 RX ORDER — SODIUM CHLORIDE, SODIUM LACTATE, POTASSIUM CHLORIDE, CALCIUM CHLORIDE 600; 310; 30; 20 MG/100ML; MG/100ML; MG/100ML; MG/100ML
INJECTION, SOLUTION INTRAVENOUS CONTINUOUS
Status: DISCONTINUED | OUTPATIENT
Start: 2020-02-14 | End: 2020-02-13 | Stop reason: HOSPADM

## 2020-02-13 RX ORDER — ONDANSETRON 2 MG/ML
INJECTION INTRAMUSCULAR; INTRAVENOUS PRN
Status: DISCONTINUED | OUTPATIENT
Start: 2020-02-13 | End: 2020-02-13 | Stop reason: SDUPTHER

## 2020-02-13 RX ORDER — SODIUM CHLORIDE 9 MG/ML
INJECTION, SOLUTION INTRAVENOUS CONTINUOUS
Status: DISCONTINUED | OUTPATIENT
Start: 2020-02-14 | End: 2020-02-13 | Stop reason: HOSPADM

## 2020-02-13 RX ORDER — LIDOCAINE HYDROCHLORIDE 10 MG/ML
1 INJECTION, SOLUTION EPIDURAL; INFILTRATION; INTRACAUDAL; PERINEURAL
Status: DISCONTINUED | OUTPATIENT
Start: 2020-02-13 | End: 2020-02-13 | Stop reason: HOSPADM

## 2020-02-13 RX ORDER — PROPOFOL 10 MG/ML
INJECTION, EMULSION INTRAVENOUS PRN
Status: DISCONTINUED | OUTPATIENT
Start: 2020-02-13 | End: 2020-02-13 | Stop reason: SDUPTHER

## 2020-02-13 RX ADMIN — ONDANSETRON 4 MG: 2 INJECTION, SOLUTION INTRAMUSCULAR; INTRAVENOUS at 11:29

## 2020-02-13 RX ADMIN — PROPOFOL 80 MG: 10 INJECTION, EMULSION INTRAVENOUS at 11:30

## 2020-02-13 RX ADMIN — PROPOFOL 20 MG: 10 INJECTION, EMULSION INTRAVENOUS at 11:34

## 2020-02-13 RX ADMIN — SODIUM CHLORIDE, POTASSIUM CHLORIDE, SODIUM LACTATE AND CALCIUM CHLORIDE: 600; 310; 30; 20 INJECTION, SOLUTION INTRAVENOUS at 11:29

## 2020-02-13 RX ADMIN — SODIUM CHLORIDE, POTASSIUM CHLORIDE, SODIUM LACTATE AND CALCIUM CHLORIDE: 600; 310; 30; 20 INJECTION, SOLUTION INTRAVENOUS at 11:04

## 2020-02-13 RX ADMIN — PROPOFOL 20 MG: 10 INJECTION, EMULSION INTRAVENOUS at 11:37

## 2020-02-13 RX ADMIN — LIDOCAINE HYDROCHLORIDE 80 MG: 20 INJECTION, SOLUTION EPIDURAL; INFILTRATION; INTRACAUDAL; PERINEURAL at 11:30

## 2020-02-13 ASSESSMENT — PAIN SCALES - GENERAL
PAINLEVEL_OUTOF10: 0

## 2020-02-13 ASSESSMENT — PULMONARY FUNCTION TESTS
PIF_VALUE: 1

## 2020-02-13 ASSESSMENT — PAIN - FUNCTIONAL ASSESSMENT: PAIN_FUNCTIONAL_ASSESSMENT: 0-10

## 2020-02-13 NOTE — ANESTHESIA PRE PROCEDURE
Cardiovascular:  Exercise tolerance: no interval change,   (+) hypertension:,           Rate: normal                    Neuro/Psych:               GI/Hepatic/Renal:             Endo/Other:    (+) Diabetes, blood dyscrasia: anemia, arthritis:., .                 Abdominal:           Vascular:                                        Anesthesia Plan      MAC and general     ASA 3       Induction: intravenous. Anesthetic plan and risks discussed with patient and child/children. Plan discussed with CRNA.     Attending anesthesiologist reviewed and agrees with Pre Eval content              Naga Nieto DO   2/13/2020

## 2020-02-13 NOTE — PROGRESS NOTES
Attempted to call lab results to Dr Callie Redman office per order instructions and there was no answer, left message for office staff to call back.     [id number]-still waiting for office to return phone call

## 2020-02-13 NOTE — H&P
x3, in no acute distress. Heart: Heart sounds are normal.  HR regular rate and rhythm without murmur, gallop or rub. Lungs: Normal respiratory effort with good air exchange and clear to auscultation without wheezes or rales bilaterally   Abdomen: soft, nontender, nondistended with bowel sounds . Labs:  Recent Labs     02/07/20  1540   HGB 9.9*   HCT 34.6*   WBC 10.8   MCV 92.0          LORENA WINN, ANP-BC  Electronically signed 2/13/2020 at 10:49 Jay Jay Talamantes MD   Physician   Gastroenterology   Progress Notes   Signed   Encounter Date:  2/7/2020          Related encounter: Office Visit from 2/7/2020 in [address] all  [x]Manual[x]Template[]Copied    Added by:  Elier Chand MD    []Dena for details  Subjective:      Patient ID: Roxann De La O is a 67 y.o. female. HPI  Dr. Thuan Yousif MD has requested that I see Roxann De La O for a consult for No diagnosis found. .  Patient seen with a history of anemia. Last time this patient was seen by me was about 3 years ago. Patient states that she was feeling weak tired. She had a labs done and was told that she has anemia. Also patient was placed on iron therapy and she has dark stools. She denies obvious hematochezia. No history of dysphagia or dyspepsia. No history of NSAID use. Does not take anticoagulation therapy. No GERD symptoms. Denies abdominal pain, bloating, nausea vomiting. Bowel movements satisfactory. Has good appetite and there is no weight loss. Her previous records reviewed. Patient had EGD done in April 2017 and was found to have polypoid lesion in the duodenum. Biopsies revealed adenoma. [facility] and management of this lesion. However it appears that patient did not follow the advice.   At that time discussed with Musculoskeletal: Normal range of motion and neck supple. Thyroid: No thyromegaly. Vascular: No hepatojugular reflux or JVD. Trachea: No tracheal deviation. Cardiovascular:      Rate and Rhythm: Normal rate and regular rhythm. Heart sounds: Normal heart sounds. Pulmonary:      Effort: Pulmonary effort is normal. No respiratory distress. Breath sounds: Normal breath sounds. No wheezing or rales. Abdominal:      General: Bowel sounds are normal. There is no distension. Palpations: Abdomen is soft. There is no hepatomegaly or mass. Tenderness: There is no abdominal tenderness. There is no rebound. Hernia: No hernia is present. Genitourinary:     Rectum: Guaiac result negative. Musculoskeletal:         General: No tenderness. Comments: No joint swelling   Lymphadenopathy:      Cervical: No cervical adenopathy. Skin:     General: Skin is warm. Findings: No bruising, ecchymosis, erythema or rash. Neurological:      Mental Status: She is alert and oriented to person, place, and time. Cranial Nerves: No cranial nerve deficit. Psychiatric:         Thought Content: Thought content normal.            Assessment:     Diagnosis Orders   1. Melena  CBC     EGD                       Plan:   Patient looks stable. Her stool is positive for occult blood. I have a suspicion that the lesion in the duodenum may be responsible for her anemia and GI blood loss. However, colonic pathology need to be evaluated as well. Patient did not have colonoscopy for more than 15 years. Patient is advised to have CBC done. She may need EGD and this will be arranged. Basing on the results of this EGD will decide whether she needs colonoscopy. After discussion, patient understood the procedure, risks and benefits and verbalized the consent.                                                                  Revision History

## 2020-02-14 LAB — SURGICAL PATHOLOGY REPORT: NORMAL

## 2020-02-17 ENCOUNTER — TELEPHONE (OUTPATIENT)
Dept: GASTROENTEROLOGY | Age: 80
End: 2020-02-17

## 2020-02-21 ENCOUNTER — HOSPITAL ENCOUNTER (OUTPATIENT)
Dept: CT IMAGING | Facility: CLINIC | Age: 80
Discharge: HOME | End: 2020-02-23
Payer: MEDICARE

## 2020-02-21 ENCOUNTER — TELEPHONE (OUTPATIENT)
Dept: GASTROENTEROLOGY | Age: 80
End: 2020-02-21

## 2020-02-21 PROBLEM — C17.0 DUODENAL ADENOCARCINOMA (HCC): Status: ACTIVE | Noted: 2020-02-13

## 2020-02-21 LAB — POC CREATININE: 0.6 MG/DL (ref 0.6–1.4)

## 2020-02-21 PROCEDURE — 74177 CT ABD & PELVIS W/CONTRAST: CPT

## 2020-02-21 PROCEDURE — 82565 ASSAY OF CREATININE: CPT

## 2020-02-21 PROCEDURE — 2580000003 HC RX 258: Performed by: INTERNAL MEDICINE

## 2020-02-21 PROCEDURE — 6360000004 HC RX CONTRAST MEDICATION: Performed by: INTERNAL MEDICINE

## 2020-02-21 RX ORDER — 0.9 % SODIUM CHLORIDE 0.9 %
80 INTRAVENOUS SOLUTION INTRAVENOUS ONCE
Status: COMPLETED | OUTPATIENT
Start: 2020-02-21 | End: 2020-02-21

## 2020-02-21 RX ORDER — SODIUM CHLORIDE 0.9 % (FLUSH) 0.9 %
10 SYRINGE (ML) INJECTION PRN
Status: DISCONTINUED | OUTPATIENT
Start: 2020-02-21 | End: 2020-02-24 | Stop reason: HOSPADM

## 2020-02-21 RX ADMIN — IOVERSOL 100 ML: 741 INJECTION INTRA-ARTERIAL; INTRAVENOUS at 11:38

## 2020-02-21 RX ADMIN — Medication 10 ML: at 11:38

## 2020-02-21 RX ADMIN — SODIUM CHLORIDE 80 ML: 9 INJECTION, SOLUTION INTRAVENOUS at 11:38

## 2020-02-21 RX ADMIN — IOHEXOL 50 ML: 240 INJECTION, SOLUTION INTRATHECAL; INTRAVASCULAR; INTRAVENOUS; ORAL at 11:38

## 2020-02-21 NOTE — PROGRESS NOTES
Subjective:      Patient ID: Horacio Singer is a 67 y.o. female. HPI  Chief Complaint   Patient presents with    Follow-up     Adenocarcinoma - per Dr. Trujillo Senior    Patient seen along with her son. Recently she had a work-up done regarding anemia and stool positive for occult blood. EGD revealed mass lesion in the second part of the duodenum, biopsies from this area revealed moderately well-differentiated, invasive adenocarcinoma. CT of the abdomen did not reveal metastatic disease. However the lesion was noted in the duodenum. At present she denies symptoms of nausea vomiting. No postprandial abdominal discomfort. No weight loss. No melanotic stools. I did discuss with the patient and patient's son in detail regarding the lesion and histology of the lesion. Review of Systems   Constitutional: Positive for fatigue and unexpected weight change. Negative for appetite change. HENT: Negative for dental problem, sore throat, trouble swallowing and voice change. Eyes: Positive for visual disturbance. Respiratory: Positive for shortness of breath. Negative for cough and choking. Cardiovascular: Negative for chest pain and leg swelling. Gastrointestinal: Positive for anal bleeding and blood in stool. Negative for abdominal distention, abdominal pain, constipation, diarrhea, nausea, rectal pain and vomiting. Musculoskeletal: Positive for back pain and myalgias. Neurological: Positive for dizziness, weakness and light-headedness. Negative for tremors. Hematological: Does not bruise/bleed easily. Psychiatric/Behavioral: Negative for sleep disturbance. The patient is nervous/anxious. Dr. Alma Castro MD our mutual patient Horacio Singer was seen  for No diagnosis found. .    Past Medical, Family, and Social History reviewed and does contribute to the patient presenting condition.     patient\"s PMH/PSH,SH,PSYCH hx, MEDs, ALLERGIES, and ROS was all reviewed and updated ion the

## 2020-02-21 NOTE — TELEPHONE ENCOUNTER
Dr Esther Ambrocio reviewed pt's path report with writer. Dr Esther Ambrocio did call pt and explain path results to her and need for surgery. He did instruct for pt to come to office on [place] on Monday, April 24th with son to discuss treatment plan in person. Writer did leave vm for pt's son with this info. Writer did speak with pt and did give instructions to office and did ask for her to contact son to also bring him. Pt verbalizes understanding.

## 2020-02-24 ENCOUNTER — OFFICE VISIT (OUTPATIENT)
Dept: GASTROENTEROLOGY | Age: 80
End: 2020-02-24
Payer: MEDICARE

## 2020-02-24 ENCOUNTER — TELEPHONE (OUTPATIENT)
Dept: GASTROENTEROLOGY | Age: 80
End: 2020-02-24

## 2020-02-24 VITALS
SYSTOLIC BLOOD PRESSURE: 152 MMHG | HEART RATE: 105 BPM | OXYGEN SATURATION: 93 % | BODY MASS INDEX: 30.23 KG/M2 | WEIGHT: 160 LBS | DIASTOLIC BLOOD PRESSURE: 59 MMHG

## 2020-02-24 PROCEDURE — G8400 PT W/DXA NO RESULTS DOC: HCPCS | Performed by: INTERNAL MEDICINE

## 2020-02-24 PROCEDURE — G8417 CALC BMI ABV UP PARAM F/U: HCPCS | Performed by: INTERNAL MEDICINE

## 2020-02-24 PROCEDURE — 1036F TOBACCO NON-USER: CPT | Performed by: INTERNAL MEDICINE

## 2020-02-24 PROCEDURE — G8482 FLU IMMUNIZE ORDER/ADMIN: HCPCS | Performed by: INTERNAL MEDICINE

## 2020-02-24 PROCEDURE — 99214 OFFICE O/P EST MOD 30 MIN: CPT | Performed by: INTERNAL MEDICINE

## 2020-02-24 PROCEDURE — 1123F ACP DISCUSS/DSCN MKR DOCD: CPT | Performed by: INTERNAL MEDICINE

## 2020-02-24 PROCEDURE — G8427 DOCREV CUR MEDS BY ELIG CLIN: HCPCS | Performed by: INTERNAL MEDICINE

## 2020-02-24 PROCEDURE — 4040F PNEUMOC VAC/ADMIN/RCVD: CPT | Performed by: INTERNAL MEDICINE

## 2020-02-24 PROCEDURE — 1090F PRES/ABSN URINE INCON ASSESS: CPT | Performed by: INTERNAL MEDICINE

## 2020-02-24 ASSESSMENT — ENCOUNTER SYMPTOMS
ABDOMINAL PAIN: 0
NAUSEA: 0
COUGH: 0
CONSTIPATION: 0
TROUBLE SWALLOWING: 0
SHORTNESS OF BREATH: 1
DIARRHEA: 0
RECTAL PAIN: 0
VOMITING: 0
ABDOMINAL DISTENTION: 0
ANAL BLEEDING: 1
CHOKING: 0
VOICE CHANGE: 0
BLOOD IN STOOL: 1
SORE THROAT: 0
BACK PAIN: 1

## 2020-02-26 LAB
A/G RATIO: NORMAL
ALBUMIN SERPL-MCNC: 3.9 G/DL
ALP BLD-CCNC: 99 U/L
ALT SERPL-CCNC: 14 U/L
AST SERPL-CCNC: 19 U/L
BASOPHILS ABSOLUTE: 0.1 /ΜL
BASOPHILS RELATIVE PERCENT: 1 %
BILIRUB SERPL-MCNC: 0.3 MG/DL (ref 0.1–1.4)
BILIRUBIN DIRECT: 0.1 MG/DL
BILIRUBIN, INDIRECT: NORMAL
EOSINOPHILS ABSOLUTE: 0.2 /ΜL
EOSINOPHILS RELATIVE PERCENT: 2.9 %
GLOBULIN: NORMAL
HCT VFR BLD CALC: 31.2 % (ref 36–46)
HEMOGLOBIN: 10 G/DL (ref 12–16)
LIPASE: 71 UNITS/L
LYMPHOCYTES ABSOLUTE: 1.9 /ΜL
LYMPHOCYTES RELATIVE PERCENT: 25.2 %
MCH RBC QN AUTO: 28.9 PG
MCHC RBC AUTO-ENTMCNC: 32.1 G/DL
MCV RBC AUTO: 90 FL
MONOCYTES ABSOLUTE: 0.5 /ΜL
MONOCYTES RELATIVE PERCENT: 6.8 %
NEUTROPHILS ABSOLUTE: 4.9 /ΜL
NEUTROPHILS RELATIVE PERCENT: 63.1 %
PDW BLD-RTO: 20.2 %
PLATELET # BLD: 368 K/ΜL
PMV BLD AUTO: 8 FL
PROTEIN TOTAL: 6.8 G/DL
RBC # BLD: 3.47 10^6/ΜL
WBC # BLD: 7.6 10^3/ML

## 2020-03-02 ENCOUNTER — TELEPHONE (OUTPATIENT)
Dept: GASTROENTEROLOGY | Age: 80
End: 2020-03-02

## 2020-03-02 NOTE — TELEPHONE ENCOUNTER
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

## 2020-03-02 NOTE — TELEPHONE ENCOUNTER
Patient is referred to Dr. Sandra Corley, the pancreatobiliary surgeon on 02/24/2020. Alessia Wall from the referred office is calling checking on the authorization to see patient due to not in network. abimael states she spoke with someone last week in regards to this and has not heard anything back yet. Pt is scheduled to be seen tomorrow 03/03/2020. Alessia Wall is requesting a call back.

## 2020-03-03 NOTE — TELEPHONE ENCOUNTER
Please work on [employer] authorization with KEVIN. Per conversation with Radha Friend at Dr. Annabelle Mar office if [facility] is told that the need for PA is life threatening then an answer will be provided within 72 hours. OP note, path report, office note, face sheet and insurance information faxed to Dr. Annabelle Mar office.     F: [phone]  P: [phone]
Pt left vm that she is still waiting to talk to Tho Rodriguez.   Thanks
Response to new TE created on 03/02/20: Returned call to Memo Palacio at Dr Frey office and she is already aware patient has out of network coverage and also has reference number provided by [facility]. Patient is being seen today, 03/03/20.
Writer spoke with Kym Lopez from [employer]. After receiving approval code for New patient visit to out of network, she states \"let me check that CPT code again. \"  She then states she has to void the authorization, as the patient has an out of network policy. No prior Nader Ayala is needed.
the appointment date and time. AVS with date, time and address for the appointment with Dr. Orquidea Solis given to [facility] with lab tests ordered today at check out.

## 2020-04-13 NOTE — TELEPHONE ENCOUNTER
Diverticulitis of intestine without perforation or abscess without bleeding     Duodenal adenoma     Diabetic polyneuropathy associated with type 2 diabetes mellitus ([address]     Malignant neoplasm of duodenum ([address]     Duodenal adenocarcinoma ([address]

## 2020-04-22 ENCOUNTER — TELEPHONE (OUTPATIENT)
Dept: FAMILY MEDICINE CLINIC | Age: 80
End: 2020-04-22

## 2020-05-01 ENCOUNTER — VIRTUAL VISIT (OUTPATIENT)
Dept: FAMILY MEDICINE CLINIC | Age: 80
End: 2020-05-01
Payer: MEDICARE

## 2020-05-01 PROCEDURE — 99442 PR PHYS/QHP TELEPHONE EVALUATION 11-20 MIN: CPT | Performed by: FAMILY MEDICINE

## 2020-05-01 PROCEDURE — 1111F DSCHRG MED/CURRENT MED MERGE: CPT | Performed by: FAMILY MEDICINE

## 2020-05-01 ASSESSMENT — ENCOUNTER SYMPTOMS
SINUS PRESSURE: 0
CONSTIPATION: 1
VOMITING: 0
NAUSEA: 1
SORE THROAT: 0
ABDOMINAL PAIN: 1
SHORTNESS OF BREATH: 0
ABDOMINAL DISTENTION: 1
DIARRHEA: 0
BACK PAIN: 0
COUGH: 0

## 2020-05-07 ENCOUNTER — TELEPHONE (OUTPATIENT)
Dept: FAMILY MEDICINE CLINIC | Age: 80
End: 2020-05-07

## 2020-05-07 NOTE — TELEPHONE ENCOUNTER
PT TAKES 30 UNITS OF INSULIN IN AM AND 30 UNITS AROUND 6 PM. THEN SHE EATS DINNER. AT BEDTIME IT SEEMS TO DROP SOMETIMES . SHE HAS TO TAKE 4 GLUCOSE TABLETS AND THE NEXT MORNING IT IS 80. SHE THINKS SHE IS GETTING TOO MUCH INSULIN BECAUSE SHE IS NOT EATING AS MUCH AS USUAL. PLEASE ADVISE.  YOU DO NOT HAVE ANY APPTS LEFT TODAY.

## 2020-05-12 ENCOUNTER — TELEPHONE (OUTPATIENT)
Dept: FAMILY MEDICINE CLINIC | Age: 80
End: 2020-05-12

## 2020-05-12 RX ORDER — CEPHALEXIN 500 MG/1
500 CAPSULE ORAL 3 TIMES DAILY
Qty: 30 CAPSULE | Refills: 0 | Status: SHIPPED | OUTPATIENT
Start: 2020-05-12 | End: 2020-05-22

## 2020-05-12 NOTE — TELEPHONE ENCOUNTER
Jessica Yenydanial is with pt to change wound vac and she noticed that on the top of her right foot has a red streak that goes from shin to the top of the foot. It is  16 X 7. Warm to the touch. Appears to be spreading up , pt states it feels \"tight\" she denies any trauma to her foot. What do you advise?

## 2020-05-21 ENCOUNTER — OFFICE VISIT (OUTPATIENT)
Dept: FAMILY MEDICINE CLINIC | Age: 80
End: 2020-05-21
Payer: MEDICARE

## 2020-05-21 VITALS
DIASTOLIC BLOOD PRESSURE: 60 MMHG | HEART RATE: 106 BPM | BODY MASS INDEX: 29.6 KG/M2 | HEIGHT: 61 IN | WEIGHT: 156.8 LBS | SYSTOLIC BLOOD PRESSURE: 128 MMHG | OXYGEN SATURATION: 97 % | TEMPERATURE: 97.4 F

## 2020-05-21 PROCEDURE — G8427 DOCREV CUR MEDS BY ELIG CLIN: HCPCS | Performed by: FAMILY MEDICINE

## 2020-05-21 PROCEDURE — G8400 PT W/DXA NO RESULTS DOC: HCPCS | Performed by: FAMILY MEDICINE

## 2020-05-21 PROCEDURE — G8417 CALC BMI ABV UP PARAM F/U: HCPCS | Performed by: FAMILY MEDICINE

## 2020-05-21 PROCEDURE — 1090F PRES/ABSN URINE INCON ASSESS: CPT | Performed by: FAMILY MEDICINE

## 2020-05-21 PROCEDURE — 99213 OFFICE O/P EST LOW 20 MIN: CPT | Performed by: FAMILY MEDICINE

## 2020-05-21 PROCEDURE — 1036F TOBACCO NON-USER: CPT | Performed by: FAMILY MEDICINE

## 2020-05-21 PROCEDURE — 4040F PNEUMOC VAC/ADMIN/RCVD: CPT | Performed by: FAMILY MEDICINE

## 2020-05-21 PROCEDURE — 1123F ACP DISCUSS/DSCN MKR DOCD: CPT | Performed by: FAMILY MEDICINE

## 2020-05-21 RX ORDER — BUMETANIDE 0.5 MG/1
0.5 TABLET ORAL DAILY
Qty: 14 TABLET | Refills: 0 | Status: SHIPPED | OUTPATIENT
Start: 2020-05-21 | End: 2020-06-24

## 2020-05-21 RX ORDER — FLUOCINONIDE 0.5 MG/G
OINTMENT TOPICAL
Qty: 60 G | Refills: 0 | Status: SHIPPED | OUTPATIENT
Start: 2020-05-21 | End: 2020-05-28

## 2020-05-21 RX ORDER — CEFDINIR 300 MG/1
300 CAPSULE ORAL 2 TIMES DAILY
Qty: 20 CAPSULE | Refills: 0 | Status: SHIPPED | OUTPATIENT
Start: 2020-05-21 | End: 2020-05-31

## 2020-05-21 ASSESSMENT — ENCOUNTER SYMPTOMS
NAUSEA: 0
SINUS PRESSURE: 0
COUGH: 0
BACK PAIN: 0
SORE THROAT: 0
VOMITING: 0
SHORTNESS OF BREATH: 0
DIARRHEA: 0

## 2020-05-21 ASSESSMENT — PATIENT HEALTH QUESTIONNAIRE - PHQ9
2. FEELING DOWN, DEPRESSED OR HOPELESS: 0
SUM OF ALL RESPONSES TO PHQ QUESTIONS 1-9: 0
SUM OF ALL RESPONSES TO PHQ QUESTIONS 1-9: 0
SUM OF ALL RESPONSES TO PHQ9 QUESTIONS 1 & 2: 0
1. LITTLE INTEREST OR PLEASURE IN DOING THINGS: 0

## 2020-05-26 RX ORDER — CALCIUM CITRATE/VITAMIN D3 200MG-6.25
TABLET ORAL
Qty: 100 EACH | Refills: 0 | Status: SHIPPED | OUTPATIENT
Start: 2020-05-26 | End: 2020-07-06

## 2020-05-26 NOTE — TELEPHONE ENCOUNTER
Next Visit Date:  Future Appointments   Date Time Provider Jessica Pascale   6/4/2020  1:30 PM Cindy Nam MD [id number]   8/6/2020  1:00 PM Cindy Nam  [address] [facility]   Topic Date Due    DTaP/Tdap/Td vaccine (1 - Tdap) 04/21/1960    Shingles Vaccine (1 of 2) 04/21/1991    DEXA (modify frequency per FRAX score)  04/21/1996    Annual Wellness Visit (AWV)  06/14/2019    Pneumococcal 65+ years Vaccine (2 of 2 - PPSV23) 06/14/2022 (Originally 10/27/2016)    Lipid screen  12/12/2020    Potassium monitoring  12/12/2020    Creatinine monitoring  12/12/2020    Flu vaccine  Completed    Hepatitis A vaccine  Aged Out    Hib vaccine  Aged Out    Meningococcal (ACWY) vaccine  Aged Out       Hemoglobin A1C (%)   Date Value   12/12/2019 8.6 (H)   06/14/2019 8.5   12/17/2018 9.8 (H)             ( goal A1C is < 7)   No results found for: LABMICR  LDL Cholesterol (mg/dL)   Date Value   06/14/2017 107   04/15/2014 95     LDL Calculated (mg/dL)   Date Value   12/12/2019 76   12/17/2018 93       (goal LDL is <100)   AST (U/L)   Date Value   02/26/2020 19     ALT (U/L)   Date Value   02/26/2020 14     BUN (mg/dL)   Date Value   12/12/2019 11     BP Readings from Last 3 Encounters:   05/21/20 128/60   02/24/20 (!) 152/59   02/13/20 (!) 157/73          (goal 120/80)    All Future Testing planned in [facility]  Lab Frequency Next Occurrence   CBC Auto Differential Once 12/14/2019   Comprehensive Metabolic Panel Once 19/89/0141   Lipid Panel Once 12/14/2019   Hemoglobin A1C Once 12/14/2019   Ferritin Once 12/16/2019   Iron and TIBC Once 12/16/2019   Vitamin B12 & Folate Once 12/16/2019   CBC Auto Differential Once 12/16/2019   CBC Once 01/10/2020   EGD Once 05/09/2020               Patient Active Problem List:     COPD (chronic obstructive pulmonary disease) ([address]     Hypertension     Dyslipidemia     Osteoporosis     Osteoarthritis     Type 2 diabetes mellitus with diabetic polyneuropathy (HCC)     Abnormal CT of the abdomen     Diverticulitis of intestine without perforation or abscess without bleeding     Duodenal adenoma     Diabetic polyneuropathy associated with type 2 diabetes mellitus ([address]     Malignant neoplasm of duodenum (HCC)     Duodenal adenocarcinoma ([address]

## 2020-06-04 ENCOUNTER — OFFICE VISIT (OUTPATIENT)
Dept: FAMILY MEDICINE CLINIC | Age: 80
End: 2020-06-04
Payer: MEDICARE

## 2020-06-04 VITALS
DIASTOLIC BLOOD PRESSURE: 68 MMHG | TEMPERATURE: 97.6 F | SYSTOLIC BLOOD PRESSURE: 134 MMHG | BODY MASS INDEX: 27.89 KG/M2 | WEIGHT: 147.6 LBS

## 2020-06-04 PROCEDURE — G8427 DOCREV CUR MEDS BY ELIG CLIN: HCPCS | Performed by: FAMILY MEDICINE

## 2020-06-04 PROCEDURE — G8400 PT W/DXA NO RESULTS DOC: HCPCS | Performed by: FAMILY MEDICINE

## 2020-06-04 PROCEDURE — 1036F TOBACCO NON-USER: CPT | Performed by: FAMILY MEDICINE

## 2020-06-04 PROCEDURE — G8417 CALC BMI ABV UP PARAM F/U: HCPCS | Performed by: FAMILY MEDICINE

## 2020-06-04 PROCEDURE — 4040F PNEUMOC VAC/ADMIN/RCVD: CPT | Performed by: FAMILY MEDICINE

## 2020-06-04 PROCEDURE — 1123F ACP DISCUSS/DSCN MKR DOCD: CPT | Performed by: FAMILY MEDICINE

## 2020-06-04 PROCEDURE — 1090F PRES/ABSN URINE INCON ASSESS: CPT | Performed by: FAMILY MEDICINE

## 2020-06-04 PROCEDURE — 99213 OFFICE O/P EST LOW 20 MIN: CPT | Performed by: FAMILY MEDICINE

## 2020-06-04 ASSESSMENT — ENCOUNTER SYMPTOMS
VOMITING: 0
SHORTNESS OF BREATH: 0
NAUSEA: 0
SINUS PRESSURE: 0
SORE THROAT: 0
DIARRHEA: 0
COUGH: 0
BACK PAIN: 0

## 2020-06-04 NOTE — PROGRESS NOTES
(LIPITOR) 10 MG tablet TAKE 1 TABLET EVERY DAY 90 tablet 3    lisinopril (PRINIVIL;ZESTRIL) 10 MG tablet TAKE 1 TABLET EVERY DAY 90 tablet 3    meclizine (ANTIVERT) 12.5 MG tablet TAKE 1 TABLET THREE TIMES DAILY AS NEEDED 270 tablet 1    DROPLET INSULIN SYRINGE 30G X 5/16\" 1 ML MISC USE TO INJECT TWICE DAILY 200 each 5    omeprazole (PRILOSEC) 20 MG delayed release capsule Take 1 capsule by mouth every morning (before breakfast) 30 capsule 5    aspirin 325 MG tablet Take 325 mg by mouth daily. No current facility-administered medications for this visit. Allergies   Allergen Reactions    Nubain [Nalbuphine Hcl]     Hydroxyzine Other (See Comments), Nausea Only and Nausea And Vomiting    Vistaril [Hydroxyzine Hcl] Nausea And Vomiting         Subjective:   Review of Systems   Constitutional: Negative for chills, diaphoresis and fever. HENT: Negative for congestion, sinus pressure and sore throat. Eyes: Negative for visual disturbance. Respiratory: Negative for cough and shortness of breath. Cardiovascular: Positive for leg swelling. Negative for chest pain. Gastrointestinal: Negative for diarrhea, nausea and vomiting. Genitourinary: Negative for dysuria, menstrual problem, urgency and vaginal discharge. Musculoskeletal: Negative for arthralgias, back pain and myalgias. Skin: Negative for rash. Neurological: Negative for weakness, numbness and headaches. Psychiatric/Behavioral: Negative for sleep disturbance.       :   /68   Temp 97.6 °F (36.4 °C) (Temporal)   Wt 147 lb 9.6 oz (67 kg)   BMI 27.89 kg/m²     Physical Exam  Constitutional:       General: She is not in acute distress. Appearance: She is well-developed. She is not diaphoretic. HENT:      Head: Normocephalic and atraumatic. Mouth/Throat:      Pharynx: No oropharyngeal exudate. Eyes:      General: No scleral icterus. Neck:      Musculoskeletal: Neck supple. Vascular: No carotid bruit. Cardiovascular:      Heart sounds: No murmur. No friction rub. No gallop. Pulmonary:      Effort: No respiratory distress. Breath sounds: No wheezing or rales. Chest:      Chest wall: No tenderness. Lymphadenopathy:      Cervical: No cervical adenopathy. Skin:     Findings: Erythema (minimal errythema) present. No rash. Neurological:      Mental Status: She is alert and oriented to person, place, and time. Cranial Nerves: No cranial nerve deficit. Psychiatric:         Behavior: Behavior normal.         Thought Content: Thought content normal.         Judgment: Judgment normal.         Assessment:       Diagnosis Orders   1. Essential hypertension     2. Type 2 diabetes mellitus with diabetic polyneuropathy, with long-term current use of insulin ([facility]     3. Bilateral lower leg cellulitis           Plan:      No follow-ups on file. No orders of the defined types were placed in this encounter. No orders of the defined types were placed in this encounter. Stop diuretic, lidex and elevate legs prn edema.   See me in August sooner prn

## 2020-06-09 ENCOUNTER — TELEPHONE (OUTPATIENT)
Dept: ONCOLOGY | Age: 80
End: 2020-06-09

## 2020-06-09 NOTE — TELEPHONE ENCOUNTER
Name: Gurpreet Sidhu  : 1940  MRN: [id number]    Oncology Navigation Follow-Up Note    Contact Type:  Telephone  Notes: Abby Bedoya, Dr. Melvina Man nurse, called in stating referral placed for MO consultation. Sade stated pt recently completed whipple surgery for duodenal cancer. Upon review of Epic noted Terrell Calero,  [facility] oncology navigation,  assigned to pt in past.  Attempted to contact Lenora Roger, no answer,  left updated on pt.     Electronically signed by Brianne Webb RN on 2020 at 3:08 PM Neutropenic fever

## 2020-06-10 ENCOUNTER — TELEPHONE (OUTPATIENT)
Dept: ONCOLOGY | Age: 80
End: 2020-06-10

## 2020-06-10 NOTE — TELEPHONE ENCOUNTER
Writer called patient and introduced self as navigator. Name and contact number provided. Patient did say she received a VM from ANDREA VALLES to schedule consult and she will call in am to do that. The pt reports to writer that her  is paralyzed and aphasic from a previous CVA and that shes worried about leaving him alone. She has very little help. Writer will place referal for LAVELLE Sanchez to assess any needs that may arise. Pt appreciative of call and support. Will continue to follow.

## 2020-06-11 ENCOUNTER — TELEPHONE (OUTPATIENT)
Dept: ONCOLOGY | Age: 80
End: 2020-06-11

## 2020-06-11 NOTE — TELEPHONE ENCOUNTER
SW received a referral from Ryley Boo, RN Navigator, to follow-up with patient. Sil Durham is an 71-year old female who is the primary caregiver for her . LAVELLE called and spoke with Sil Durham, who was very anxious about going to see Med Onc for follow-up. She stated that \"Dr. Razia Meléndez got all my cancer, I don't have cancer anymore so why do I have to go?\". SW explained best practices and why it would be a good idea to follow-up with the Oncologist and that she may/may not even need anything further. Patient then agreeable, but voices concerns with leaving her . Her  is on hospice care and has [employer] involved with home healthcare for RN and aide services. She can schedule them to come in while she goes for her appointment and was given the number to Med Onc in case she would need to change the time of her upcoming appointment. She states her daughter-in-law often takes her to appointments, but would need to take off work. Patient has [employer] Medicare in place and they do not assist with rides, so SW told her about [facility] and she could utilize this as needed. Sil Durham was given the number for scheduling rides and SW called to [facility] for the [address] also given SW contact and encouraged her to call anytime. LAVELLE further explained different roles of SW and Navigator, she voices understanding. Turner Paulino.  Blair Minors

## 2020-06-12 ENCOUNTER — TELEPHONE (OUTPATIENT)
Dept: FAMILY MEDICINE CLINIC | Age: 80
End: 2020-06-12

## 2020-06-12 RX ORDER — BACLOFEN 10 MG/1
10 TABLET ORAL 3 TIMES DAILY
Qty: 21 TABLET | Refills: 0 | Status: SHIPPED | OUTPATIENT
Start: 2020-06-12 | End: 2020-06-24 | Stop reason: ALTCHOICE

## 2020-06-12 NOTE — TELEPHONE ENCOUNTER
Patient fell 06/06/20 nothing is broke would like to have a muscle relaxer for the discomfort in her muscles    Emerson Sage and [employer] [address]

## 2020-06-12 NOTE — TELEPHONE ENCOUNTER
Patient advised will get back to make vincenzo't has an vincenzo't with her cancer doctor has to check her schedule

## 2020-06-19 ENCOUNTER — HOSPITAL ENCOUNTER (OUTPATIENT)
Facility: MEDICAL CENTER | Age: 80
End: 2020-06-19
Payer: MEDICARE

## 2020-06-19 ENCOUNTER — OFFICE VISIT (OUTPATIENT)
Dept: FAMILY MEDICINE CLINIC | Age: 80
End: 2020-06-19
Payer: MEDICARE

## 2020-06-19 VITALS
TEMPERATURE: 98.4 F | BODY MASS INDEX: 29.1 KG/M2 | DIASTOLIC BLOOD PRESSURE: 74 MMHG | HEART RATE: 107 BPM | OXYGEN SATURATION: 97 % | SYSTOLIC BLOOD PRESSURE: 132 MMHG | WEIGHT: 154 LBS

## 2020-06-19 PROCEDURE — G8417 CALC BMI ABV UP PARAM F/U: HCPCS | Performed by: FAMILY MEDICINE

## 2020-06-19 PROCEDURE — 99213 OFFICE O/P EST LOW 20 MIN: CPT | Performed by: FAMILY MEDICINE

## 2020-06-19 PROCEDURE — 1123F ACP DISCUSS/DSCN MKR DOCD: CPT | Performed by: FAMILY MEDICINE

## 2020-06-19 PROCEDURE — 1090F PRES/ABSN URINE INCON ASSESS: CPT | Performed by: FAMILY MEDICINE

## 2020-06-19 PROCEDURE — 1036F TOBACCO NON-USER: CPT | Performed by: FAMILY MEDICINE

## 2020-06-19 PROCEDURE — G8427 DOCREV CUR MEDS BY ELIG CLIN: HCPCS | Performed by: FAMILY MEDICINE

## 2020-06-19 PROCEDURE — G8400 PT W/DXA NO RESULTS DOC: HCPCS | Performed by: FAMILY MEDICINE

## 2020-06-19 PROCEDURE — 4040F PNEUMOC VAC/ADMIN/RCVD: CPT | Performed by: FAMILY MEDICINE

## 2020-06-19 ASSESSMENT — ENCOUNTER SYMPTOMS
SINUS PRESSURE: 0
COUGH: 0
NAUSEA: 0
DIARRHEA: 0
BACK PAIN: 1
SHORTNESS OF BREATH: 0
VOMITING: 0
SORE THROAT: 0

## 2020-06-19 NOTE — PROGRESS NOTES
Keven Newman is a 79 y.o. female who presents todayfor her medical conditions/complaints as noted below. Keven Newman is here today c/oFall (2 weeks ago and its her knee and hip hurt ) and Lower Back Pain      :     HPI    She fell in her bedroom two weeks ago injuring her left lower back and hip region. She has been taking baclofen but can only tolerate this at bedtime. She did try some \"oxycodone\" which she had leftover from her surgery but does not feel this was very helpful either.     Past Medical History:   Diagnosis Date    DM2 (diabetes mellitus, type 2) ([address] [facility]     Duodenal adenocarcinoma ([address] 2020    WELL TO MODERATELY DIFFERENTIATED INVASIVE    Duodenal adenoma 2017    Hyperlipidemia     Hypertension     Osteoarthritis       Past Surgical History:   Procedure Laterality Date    BREAST SURGERY      COLONOSCOPY      last one 4 years, pt is unsure of where    [place] E[place] REMOVAL TUMOR POLYP/OTHER LESION SNARE TECH N/A 4/3/2017    EGD POLYP SNARE performed by Kailey Horner MD at [address] [place] [address]  2017    In the 2nd part of the duodenum there is a 3-4 cm mass lesion seen, occupies about half of the lumen-pathology--adenoma    UPPER GASTROINTESTINAL ENDOSCOPY N/A 2020    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     Family History   Problem Relation Age of Onset    Heart Disease Mother     No Known Problems Father      Social History     Tobacco Use    Smoking status: Former Smoker     Packs/day: 1.00     Years: 27.00     Pack years: 27.00     Types: Cigarettes     Last attempt to quit: 10/15/2003     Years since quittin.6    Smokeless tobacco: Never Used   Substance Use Topics    Alcohol use: No      Current Outpatient Medications   Medication Sig Dispense Refill    baclofen (LIORESAL) 10 MG tablet Take 1 tablet by mouth 3 times daily 21 tablet 0    blood glucose test strips (TRUE METRIX BLOOD

## 2020-06-24 ENCOUNTER — TELEPHONE (OUTPATIENT)
Dept: ONCOLOGY | Age: 80
End: 2020-06-24

## 2020-06-24 ENCOUNTER — INITIAL CONSULT (OUTPATIENT)
Dept: ONCOLOGY | Age: 80
End: 2020-06-24
Payer: MEDICARE

## 2020-06-24 VITALS
HEART RATE: 93 BPM | WEIGHT: 145.4 LBS | TEMPERATURE: 99 F | SYSTOLIC BLOOD PRESSURE: 136 MMHG | BODY MASS INDEX: 27.47 KG/M2 | DIASTOLIC BLOOD PRESSURE: 62 MMHG

## 2020-06-24 PROCEDURE — G8417 CALC BMI ABV UP PARAM F/U: HCPCS | Performed by: INTERNAL MEDICINE

## 2020-06-24 PROCEDURE — G8427 DOCREV CUR MEDS BY ELIG CLIN: HCPCS | Performed by: INTERNAL MEDICINE

## 2020-06-24 PROCEDURE — 1090F PRES/ABSN URINE INCON ASSESS: CPT | Performed by: INTERNAL MEDICINE

## 2020-06-24 PROCEDURE — 99205 OFFICE O/P NEW HI 60 MIN: CPT | Performed by: INTERNAL MEDICINE

## 2020-06-24 PROCEDURE — 99202 OFFICE O/P NEW SF 15 MIN: CPT | Performed by: INTERNAL MEDICINE

## 2020-06-25 PROBLEM — C24.1 AMPULLARY CARCINOMA (HCC): Status: ACTIVE | Noted: 2020-06-25

## 2020-06-25 NOTE — PROGRESS NOTES
Patient ID: Birgit Mejias, 1940, [id number], 79 y.o. 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 female with diagnosis of ampullary carcinoma was seen during initial consultation visit. Patient has history of anemia and following with her PCP. She also had a EGD in 2017 which showed polypoid lesion in the duodenum and she was advised to follow with [facility] however patient seems like did not have a follow-up and recently in February she was seen by gastroenterology for working up for anemia. Patient had upper endoscopy on 2/13/2020 which showed malignant-looking lesion in the second portion of the duodenum. Final pathology showed duodenal adenocarcinoma. She had CT abdomen pelvis which showed duodenal mass at the level of ampulla with mild pancreatic and biliary ductal dilatation. She reports weight loss about 5 to 10 pounds prior to surgery. 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    He is accompanied by her son during today's office visit  Past Medical History:   Diagnosis Date    DM2 (diabetes mellitus, type 2) ([address]     Duodenal adenocarcinoma ([facility] 02/13/2020    WELL TO MODERATELY DIFFERENTIATED INVASIVE    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    Hyperlipidemia     Hypertension     Osteoarthritis        Past Surgical History:   Procedure Laterality Date    BREAST SURGERY      COLONOSCOPY      last one 4 years, pt is unsure of where    [place] EGD REMOVAL TUMOR POLYP/OTHER LESION SNARE TECH N/A 4/3/2017    EGD POLYP SNARE performed by Alejandro Herrera MD at [address] [place] [address]  04/03/2017    In the 2nd part of the duodenum there is a 3-4 cm mass lesion seen, occupies about half of the lumen-pathology--adenoma    UPPER 06/24/20 1506   BP: 136/62   Pulse: 93   Temp: 99 °F (37.2 °C)       PHYSICAL EXAM:   General appearance - well appearing, no in pain or distress   Mental status - alert and cooperative   Eyes - pupils equal and reactive, extraocular eye movements intact   Ears - bilateral TM's and external ear canals normal   Mouth - mucous membranes moist, pharynx normal without lesions   Neck - supple, no significant adenopathy   Lymphatics - no palpable lymphadenopathy, no hepatosplenomegaly   Chest - clear to auscultation, no wheezes, rales or rhonchi, symmetric air entry   Heart - normal rate, regular rhythm, normal S1, S2, no murmurs, rubs, clicks or gallops   Abdomen - soft, nontender, nondistended, no masses or organomegaly   Neurological - alert, oriented, normal speech, no focal findings or movement disorder noted   Musculoskeletal - no joint tenderness, deformity or swelling   Extremities - peripheral pulses normal, no pedal edema, no clubbing or cyanosis   Skin - normal coloration and turgor, no rashes, no suspicious skin lesions noted ,      LABORATORY DATA:     Lab Results   Component Value Date    WBC 7.6 02/26/2020    HGB 10.0 (A) 02/26/2020    HCT 31.2 (A) 02/26/2020    MCV 90 02/26/2020     02/26/2020    LYMPHOPCT 25.2 02/26/2020    RBC 3.47 02/26/2020    MCH 28.9 02/26/2020    MCHC 32.1 02/26/2020    RDW 20.2 02/26/2020    NEUTOPHILPCT 63.1 02/26/2020    MONOPCT 6.8 02/26/2020    EOSPCT 2.9 02/26/2020    BASOPCT 1.0 02/26/2020    NEUTROABS 4.9 02/26/2020    LYMPHSABS 1.9 02/26/2020    MONOSABS 0.5 02/26/2020    EOSABS 0.2 02/26/2020    BASOSABS 0.1 02/26/2020         Chemistry        Component Value Date/Time     12/12/2019 0855    K 4.4 12/12/2019 0855     12/12/2019 0855    CO2 30 12/12/2019 0855    BUN 11 12/12/2019 0855    CREATININE 0.6 02/21/2020 1130    CREATININE 0.57 12/12/2019 0855        Component Value Date/Time    CALCIUM 9.0 12/12/2019 0855    ALKPHOS 99 02/26/2020    AST 19 02/26/2020    ALT 14 02/26/2020    BILITOT 0.3 02/26/2020            PATHOLOGY DATA:   SURGICAL PATHOLOGY CONSULTATION Collected: 2/13/2020   -- Diagnosis --   DUODENUM, BIOPSIES:        -  WELL-TO MODERATELY DIFFERENTIATED INVASIVE ADENOCARCINOMA. Pathology report, [facility] 4/15/20:  Final diagnosis:  A. Stomach, small intestine, and head of pancreas, Whipple resection:  Intra-and periampullary adenocarcinoma, moderately differentiated, mixed intestinal and pancreaticobiliary type, invasive carcinoma length 2.5 cm, carcinoma invades duodenal submucosa and muscularis propria. Lymphovascular invasion present present, duodenal and intra-ampullary adenomatous change margins are negative for invasive carcinoma and high-grade dysplasia  Metastatic  Carcinoma, 4 out of 18 lymph nodes  B. Gallbladder, resection:  Chronic cholecystitis with adenomyoma, negative for malignancy or dysplasia  C; lymph node, hepatic artery excision:  Lymph node with lipogranulomata, negative for malignancy  D. Lymph nodes, portal, regional resection:  8 lymph nodes with lipogranulomata, negative for malignancy  E.  Bile duct, final margin, excision:  Benign bile duct and peripheral nerves, negative for malignancy    Pathology stage: [id number]     IMAGING DATA:    Reviewed  CT abdomen pelvis 2/21/2020:  Redemonstration of duodenal mass at the level of the ampulla resulting in   mild pancreatic duct and biliary enlargement.  No evidence for bowel   obstruction or adjacent inflammatory change.       Diverticulosis.  No colonic mass delineated on this exam.     ASSESSMENT:    In total, I spent greater than 80 minutes in face-to-face consultation with the patient and the majority of this time was spent in education, counseling, and coordination of care.   During our encounter, I personally reviewed the above history and evaluation, including radiology and pathology data, in detail    Earl Madden Is a 79 y.o. 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 I reviewed the laboratory data, imaging studies, surgical pathology, diagnosis, prognosis, treatment options and goals of care with patient and family. Patient is recovering from surgery well. She is accompanied by her son during today's office visit. I explained that as per the NCCN guidelines patient would need adjuvant systemic chemotherapy due to high rate of recurrence. Patient and her son are adamant that if the cancer is out, they do not want to consider any chemotherapy or radiation therapy. I discussed the risks benefits and explained that without chemotherapy her chance of cancer coming back is very high. Patient understand the risk. During today's visit, the patient and the family had a number of reasonable questions which were answered to their satisfaction. They verbalized understanding of the information provided and they agreed to proceed as outlined above. PLAN:   I reviewed the goals of care  I discussed the role of adjuvant treatment, however patient and son very adamant about not getting any chemotherapy or radiation therapy  She understand the risk and benefits  We will get restaging scans and labs in 6 weeks      Mckay Flores MD  Hematologist/Medical Oncologist      This note is created with the assistance of a speech recognition program.  While intending to generate a document that actually reflects the content of the visit, the document can still have some errors including those of syntax and sound a like substitutions which may escape proof reading. It such instances, actual meaning can be extrapolated by contextual diversion.

## 2020-06-26 RX ORDER — MECLIZINE HCL 12.5 MG/1
TABLET ORAL
Qty: 270 TABLET | Refills: 1 | Status: SHIPPED | OUTPATIENT
Start: 2020-06-26 | End: 2020-12-16

## 2020-06-30 ENCOUNTER — TELEPHONE (OUTPATIENT)
Dept: FAMILY MEDICINE CLINIC | Age: 80
End: 2020-06-30

## 2020-06-30 DIAGNOSIS — T81.31XD POSTOPERATIVE DEHISCENCE OF SKIN WOUND, SUBSEQUENT ENCOUNTER: Primary | ICD-10-CM

## 2020-06-30 RX ORDER — OXYCODONE HYDROCHLORIDE AND ACETAMINOPHEN 5; 325 MG/1; MG/1
1 TABLET ORAL EVERY 6 HOURS PRN
Qty: 28 TABLET | Refills: 0 | Status: SHIPPED | OUTPATIENT
Start: 2020-06-30 | End: 2020-07-29 | Stop reason: SDUPTHER

## 2020-06-30 NOTE — TELEPHONE ENCOUNTER
Patient is calling wanting Oxycodone sent to her pharmacy, she said you asked her at her last appointment if she wanted some at the time she said no but now would like you to call a script in please.     Maria Elena Reyes [phone]  Tiera Willard and [employer]

## 2020-06-30 NOTE — TELEPHONE ENCOUNTER
ERROR
Differential Once 06/24/2020   Comprehensive Metabolic Panel Once 98/72/1636   Cancer Antigen 19-9 Once 06/24/2020   CT ABDOMEN W WO CONTRAST Once 06/24/2020               Patient Active Problem List:     COPD (chronic obstructive pulmonary disease) ([address]     Hypertension     Dyslipidemia     Osteoporosis     Osteoarthritis     Type 2 diabetes mellitus with diabetic polyneuropathy (HCC)     Abnormal CT of the abdomen     Diverticulitis of intestine without perforation or abscess without bleeding     Duodenal adenoma     Diabetic polyneuropathy associated with type 2 diabetes mellitus ([address]     Malignant neoplasm of duodenum (HCC)     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[address]

## 2020-06-30 NOTE — TELEPHONE ENCOUNTER
Patient is all upset because her sugar is all over the place.  She has been waking up between 2-3 am and her sugar has been as low as 47.     2:45 am this morning it was 75, she has been taking glucose tablets orange juice cottage cheese    Please advise

## 2020-07-06 RX ORDER — CALCIUM CITRATE/VITAMIN D3 200MG-6.25
TABLET ORAL
Qty: 100 EACH | Refills: 0 | Status: SHIPPED | OUTPATIENT
Start: 2020-07-06 | End: 2020-07-08 | Stop reason: SDUPTHER

## 2020-07-08 ENCOUNTER — TELEPHONE (OUTPATIENT)
Dept: FAMILY MEDICINE CLINIC | Age: 80
End: 2020-07-08

## 2020-07-08 RX ORDER — CALCIUM CITRATE/VITAMIN D3 200MG-6.25
TABLET ORAL
Qty: 100 EACH | Refills: 0 | Status: SHIPPED | OUTPATIENT
Start: 2020-07-08 | End: 2020-08-13

## 2020-07-08 NOTE — TELEPHONE ENCOUNTER
Dr. Marylu Stewart called all upset because she hadn't received her test strips yet. I told her it was confirmed by the pharmacy. She said [facility] says there's something wrong with the script. I called [facility] and the pharmacist said you need to send in a new script with a diagnosis on it why she needs test strips. Medicare won't approve without a diagnosis.

## 2020-07-13 ENCOUNTER — TELEPHONE (OUTPATIENT)
Dept: ONCOLOGY | Age: 80
End: 2020-07-13

## 2020-07-13 NOTE — TELEPHONE ENCOUNTER
Spoke with Safia via phone,. She is scheduled 08/03/2020 @ 1500 for CT scan of abd w / wo contrast and she does not remember her instructions. Writer clarified with Radiology. Food up to two hours prior and clear liquids up to test.    Understanding voiced and repeated.

## 2020-07-29 RX ORDER — OXYCODONE HYDROCHLORIDE AND ACETAMINOPHEN 5; 325 MG/1; MG/1
1 TABLET ORAL EVERY 6 HOURS PRN
Qty: 28 TABLET | Refills: 0 | Status: SHIPPED | OUTPATIENT
Start: 2020-07-29 | End: 2020-08-31 | Stop reason: SDUPTHER

## 2020-07-29 NOTE — TELEPHONE ENCOUNTER
oarrs 6/30/20    Last visit: 6/19/20    Last Med refill: 6/30/20    Next Visit Date:  Future Appointments   Date Time Provider Jessica Ashraf   8/3/2020  2:00 PM SCHEDULE, [facility]   8/3/2020  3:00 PM STA CT SCAN RM 1 STAZ CT SCAN STA Radiolog   8/6/2020  1:00 PM Keri Pollack MD [facility]   8/12/2020  2:15 PM Flaquito Serrano MD [id number] [facility] Maintenance   Topic Date Due    DEXA (modify frequency per FRAX score)  04/21/1996    Annual Wellness Visit (AWV)  06/14/2019    DTaP/Tdap/Td vaccine (1 - Tdap) 06/19/2021 (Originally 4/21/1959)    Shingles Vaccine (1 of 2) 06/19/2021 (Originally 4/21/1991)    Pneumococcal 65+ years Vaccine (2 of 2 - PPSV23) 06/14/2022 (Originally 10/27/2016)    Flu vaccine (1) 09/01/2020    Lipid screen  12/12/2020    Potassium monitoring  12/12/2020    Creatinine monitoring  12/12/2020    Hepatitis A vaccine  Aged Out    Hib vaccine  Aged Out    Meningococcal (ACWY) vaccine  Aged Out       Hemoglobin A1C (%)   Date Value   12/12/2019 8.6 (H)   06/14/2019 8.5   12/17/2018 9.8 (H)             ( goal A1C is < 7)   No results found for: LABMICR  LDL Cholesterol (mg/dL)   Date Value   06/14/2017 107   04/15/2014 95     LDL Calculated (mg/dL)   Date Value   12/12/2019 76   12/17/2018 93       (goal LDL is <100)   AST (U/L)   Date Value   02/26/2020 19     ALT (U/L)   Date Value   02/26/2020 14     BUN (mg/dL)   Date Value   12/12/2019 11     BP Readings from Last 3 Encounters:   06/24/20 136/62   06/19/20 132/74   06/04/20 134/68          (goal 120/80)    All Future Testing planned in [facility]  Lab Frequency Next Occurrence   Ferritin Once 12/16/2019   Iron and TIBC Once 12/16/2019   Vitamin B12 & Folate Once 12/16/2019   CBC Auto Differential Once 12/16/2019   CBC Once 01/10/2021   XR LUMBAR SPINE (2-3 VIEWS) Once 06/19/2020   XR HIP LEFT (2-3 VIEWS) Once 06/19/2020   CBC With Auto Differential Once 06/24/2020   Comprehensive

## 2020-08-03 ENCOUNTER — HOSPITAL ENCOUNTER (OUTPATIENT)
Dept: CT IMAGING | Age: 80
Discharge: HOME | End: 2020-08-05
Payer: MEDICARE

## 2020-08-03 ENCOUNTER — HOSPITAL ENCOUNTER (OUTPATIENT)
Facility: MEDICAL CENTER | Age: 80
Discharge: HOME | End: 2020-08-03
Payer: MEDICARE

## 2020-08-03 DIAGNOSIS — C24.1 AMPULLARY CARCINOMA (HCC): ICD-10-CM

## 2020-08-03 LAB
ABSOLUTE EOS #: 0.11 K/UL (ref 0–0.4)
ABSOLUTE IMMATURE GRANULOCYTE: 0.11 K/UL (ref 0–0.3)
ABSOLUTE LYMPH #: 2.78 K/UL (ref 1–4.8)
ABSOLUTE MONO #: 0.89 K/UL (ref 0.2–0.8)
ALBUMIN SERPL-MCNC: 2.8 G/DL (ref 3.5–5.2)
ALBUMIN/GLOBULIN RATIO: ABNORMAL (ref 1–2.5)
ALP BLD-CCNC: 476 U/L (ref 35–104)
ALT SERPL-CCNC: 41 U/L (ref 5–33)
ANION GAP SERPL CALCULATED.3IONS-SCNC: 8 MMOL/L (ref 9–17)
AST SERPL-CCNC: 79 U/L
BASOPHILS # BLD: 1 %
BASOPHILS ABSOLUTE: 0.11 K/UL (ref 0–0.2)
BILIRUB SERPL-MCNC: 0.19 MG/DL (ref 0.3–1.2)
BUN BLDV-MCNC: 10 MG/DL (ref 8–23)
BUN/CREAT BLD: 20 (ref 9–20)
CA 19-9: 38 U/ML (ref 0–35)
CALCIUM SERPL-MCNC: 8.2 MG/DL (ref 8.6–10.4)
CHLORIDE BLD-SCNC: 99 MMOL/L (ref 98–107)
CO2: 29 MMOL/L (ref 20–31)
CREAT SERPL-MCNC: 0.49 MG/DL (ref 0.5–0.9)
DIFFERENTIAL TYPE: ABNORMAL
EOSINOPHILS RELATIVE PERCENT: 1 % (ref 1–4)
GFR AFRICAN AMERICAN: >60 ML/MIN
GFR NON-AFRICAN AMERICAN: >60 ML/MIN
GFR SERPL CREATININE-BSD FRML MDRD: ABNORMAL ML/MIN/{1.73_M2}
GFR SERPL CREATININE-BSD FRML MDRD: ABNORMAL ML/MIN/{1.73_M2}
GLUCOSE BLD-MCNC: 312 MG/DL (ref 70–99)
HCT VFR BLD CALC: 33.7 % (ref 36.3–47.1)
HEMOGLOBIN: 10 G/DL (ref 11.9–15.1)
IMMATURE GRANULOCYTES: 1 %
LYMPHOCYTES # BLD: 25 % (ref 24–44)
MCH RBC QN AUTO: 25.1 PG (ref 25.2–33.5)
MCHC RBC AUTO-ENTMCNC: 29.7 G/DL (ref 28.4–34.8)
MCV RBC AUTO: 84.7 FL (ref 82.6–102.9)
MONOCYTES # BLD: 8 % (ref 1–7)
MORPHOLOGY: ABNORMAL
NRBC AUTOMATED: 0 PER 100 WBC
PDW BLD-RTO: 20.6 % (ref 11.8–14.4)
PLATELET # BLD: 471 K/UL (ref 138–453)
PLATELET ESTIMATE: ABNORMAL
PMV BLD AUTO: 9.4 FL (ref 8.1–13.5)
POTASSIUM SERPL-SCNC: 4.1 MMOL/L (ref 3.7–5.3)
RBC # BLD: 3.98 M/UL (ref 3.95–5.11)
RBC # BLD: ABNORMAL 10*6/UL
SEG NEUTROPHILS: 64 % (ref 36–66)
SEGMENTED NEUTROPHILS ABSOLUTE COUNT: 7.1 K/UL (ref 1.8–7.7)
SODIUM BLD-SCNC: 136 MMOL/L (ref 135–144)
TOTAL PROTEIN: 5.9 G/DL (ref 6.4–8.3)
WBC # BLD: 11.1 K/UL (ref 3.5–11.3)
WBC # BLD: ABNORMAL 10*3/UL

## 2020-08-03 PROCEDURE — 85025 COMPLETE CBC W/AUTO DIFF WBC: CPT

## 2020-08-03 PROCEDURE — 80053 COMPREHEN METABOLIC PANEL: CPT

## 2020-08-03 PROCEDURE — 6360000004 HC RX CONTRAST MEDICATION: Performed by: INTERNAL MEDICINE

## 2020-08-03 PROCEDURE — 74160 CT ABDOMEN W/CONTRAST: CPT

## 2020-08-03 PROCEDURE — 2580000003 HC RX 258: Performed by: INTERNAL MEDICINE

## 2020-08-03 PROCEDURE — 36415 COLL VENOUS BLD VENIPUNCTURE: CPT

## 2020-08-03 PROCEDURE — 86301 IMMUNOASSAY TUMOR CA 19-9: CPT

## 2020-08-03 RX ORDER — 0.9 % SODIUM CHLORIDE 0.9 %
80 INTRAVENOUS SOLUTION INTRAVENOUS ONCE
Status: DISCONTINUED | OUTPATIENT
Start: 2020-08-03 | End: 2020-08-06 | Stop reason: HOSPADM

## 2020-08-03 RX ORDER — SODIUM CHLORIDE 0.9 % (FLUSH) 0.9 %
10 SYRINGE (ML) INJECTION
Status: COMPLETED | OUTPATIENT
Start: 2020-08-03 | End: 2020-08-03

## 2020-08-03 RX ADMIN — IOPAMIDOL 75 ML: 755 INJECTION, SOLUTION INTRAVENOUS at 15:47

## 2020-08-03 RX ADMIN — IOHEXOL 30 ML: 300 INJECTION, SOLUTION INTRAVENOUS at 15:47

## 2020-08-03 RX ADMIN — Medication 10 ML: at 15:47

## 2020-08-04 ENCOUNTER — TELEPHONE (OUTPATIENT)
Dept: FAMILY MEDICINE CLINIC | Age: 80
End: 2020-08-04

## 2020-08-04 NOTE — TELEPHONE ENCOUNTER
This is up to her however the labs she had completed yesterday for her oncologist had nothing to do with her DM control in fact her blood sugar was 312 but no A1c was completed. If she wishes to postpone visit for a period of time I will leave up to her but she probably needs an A1c sometime this fall.

## 2020-08-04 NOTE — TELEPHONE ENCOUNTER
Dr. Yumi Orourke,    Patient called wanting to cancel her appointment 08/06/20 she states she is taking 30 U. Am and 10 U. Pm    She had labs done 08/03/20 please look at her lab results.     She also had a CT Abdomen 08/03/20    Please advise

## 2020-08-07 ENCOUNTER — HOSPITAL ENCOUNTER (OUTPATIENT)
Facility: MEDICAL CENTER | Age: 80
End: 2020-08-07
Payer: MEDICARE

## 2020-08-11 ENCOUNTER — TELEPHONE (OUTPATIENT)
Dept: ONCOLOGY | Age: 80
End: 2020-08-11

## 2020-08-11 NOTE — TELEPHONE ENCOUNTER
Called patient to remind her of appt. on 8/12/20 with Dr. Ev Mackey . Patient stated she knew nothing about scheduled appointment , stated she never made the appt. ,  was not able to come tomorrow or any other time, as her  is very ill , she has to call hospice to have them come out to house to watch him if she needs to leave the house. Patient would like a call from Dr. Ev Mackey to go over test results   (CT Abdomen / Waylan Sniff ) all done on 8/03/20. Offered Virtual appt. / My Chart appt. , patient does not have smart phone or computer, patient asking for Dr. Ev Mackey to please call and give her test results . Canceled appointment for 8/12/20 and putting encounter in triage office. Patient can be reached at [phone].

## 2020-08-12 ENCOUNTER — TELEPHONE (OUTPATIENT)
Dept: ONCOLOGY | Age: 80
End: 2020-08-12

## 2020-08-12 NOTE — TELEPHONE ENCOUNTER
Dr. Sherry Fink reviewed recent scan and lab and updated pt was unable to keep md appt today due to care of her elderly . Writer phoned [address] with updates and she scheduled md follow up 08/26/2020 @ 1330 ( pink slip to clerical). Safia isn't certain about chemo but has questions on what kind and how long would it be necessary ect. Explained those details could be discussed with md. Understanding voiced.

## 2020-08-13 RX ORDER — CALCIUM CITRATE/VITAMIN D3 200MG-6.25
TABLET ORAL
Qty: 100 EACH | Refills: 0 | Status: SHIPPED | OUTPATIENT
Start: 2020-08-13 | End: 2020-09-28

## 2020-08-14 ENCOUNTER — HOSPITAL ENCOUNTER (OUTPATIENT)
Facility: MEDICAL CENTER | Age: 80
End: 2020-08-14
Payer: MEDICARE

## 2020-08-19 ENCOUNTER — OFFICE VISIT (OUTPATIENT)
Dept: ONCOLOGY | Age: 80
End: 2020-08-19
Payer: MEDICARE

## 2020-08-19 ENCOUNTER — TELEPHONE (OUTPATIENT)
Dept: ONCOLOGY | Age: 80
End: 2020-08-19

## 2020-08-19 VITALS
WEIGHT: 137.2 LBS | SYSTOLIC BLOOD PRESSURE: 143 MMHG | DIASTOLIC BLOOD PRESSURE: 60 MMHG | TEMPERATURE: 98.3 F | BODY MASS INDEX: 25.92 KG/M2 | HEART RATE: 95 BPM

## 2020-08-19 PROCEDURE — 99214 OFFICE O/P EST MOD 30 MIN: CPT | Performed by: INTERNAL MEDICINE

## 2020-08-19 PROCEDURE — 1036F TOBACCO NON-USER: CPT | Performed by: INTERNAL MEDICINE

## 2020-08-19 PROCEDURE — G8417 CALC BMI ABV UP PARAM F/U: HCPCS | Performed by: INTERNAL MEDICINE

## 2020-08-19 PROCEDURE — G8427 DOCREV CUR MEDS BY ELIG CLIN: HCPCS | Performed by: INTERNAL MEDICINE

## 2020-08-19 PROCEDURE — 99211 OFF/OP EST MAY X REQ PHY/QHP: CPT | Performed by: INTERNAL MEDICINE

## 2020-08-19 PROCEDURE — 1090F PRES/ABSN URINE INCON ASSESS: CPT | Performed by: INTERNAL MEDICINE

## 2020-08-19 PROCEDURE — 4040F PNEUMOC VAC/ADMIN/RCVD: CPT | Performed by: INTERNAL MEDICINE

## 2020-08-19 PROCEDURE — G8400 PT W/DXA NO RESULTS DOC: HCPCS | Performed by: INTERNAL MEDICINE

## 2020-08-19 PROCEDURE — 1123F ACP DISCUSS/DSCN MKR DOCD: CPT | Performed by: INTERNAL MEDICINE

## 2020-08-31 RX ORDER — OXYCODONE HYDROCHLORIDE AND ACETAMINOPHEN 5; 325 MG/1; MG/1
1 TABLET ORAL EVERY 6 HOURS PRN
Qty: 28 TABLET | Refills: 0 | Status: SHIPPED | OUTPATIENT
Start: 2020-08-31 | End: 2020-09-28 | Stop reason: SDUPTHER

## 2020-09-04 NOTE — TELEPHONE ENCOUNTER
Birgit Mejias is calling to request a refill on the following medication(s):    Medication Request:  Requested Prescriptions     Pending Prescriptions Disp Refills    insulin 70-30 (NOVOLIN 70/30 RELION) (70-30) 100 UNIT per ML injection vial [Pharmacy Med Name: NovoLIN 70/30 ReliOn (70-30) 100 UNIT/ML Subcutaneous Suspension] 40 mL 0     Sig: INJECT 80 UNITS SUBCUTANEOUSLY IN THE MORNING AND  INJECT  50  UNITS  IN  THE  EVENING       Last Visit Date (If Applicable):  8/85/6440    Next Visit Date:    Visit date not found

## 2020-09-08 NOTE — TELEPHONE ENCOUNTER
PATIENT IS TAKING 30 UNITS IN THE MORNING AND 10 UNITS AT [facility] is calling to request a refill on the following medication(s):    Medication Request:  Requested Prescriptions     Pending Prescriptions Disp Refills    insulin 70-30 (NOVOLIN 70/30 RELION) (70-30) 100 UNIT per ML injection vial 40 mL 0     Sig: INJECT 30 UNITS SUBCUTANEOUSLY IN THE MORNING AND  INJECT  10  UNITS  IN  THE  EVENING       Last Visit Date (If Applicable):  0/54/6759    Next Visit Date:    10/20/2020

## 2020-09-28 RX ORDER — CALCIUM CITRATE/VITAMIN D3 200MG-6.25
TABLET ORAL
Qty: 100 EACH | Refills: 0 | Status: SHIPPED | OUTPATIENT
Start: 2020-09-28 | End: 2020-11-17

## 2020-09-28 RX ORDER — OXYCODONE HYDROCHLORIDE AND ACETAMINOPHEN 5; 325 MG/1; MG/1
1 TABLET ORAL EVERY 6 HOURS PRN
Qty: 28 TABLET | Refills: 0 | Status: SHIPPED | OUTPATIENT
Start: 2020-09-28 | End: 2020-10-05

## 2020-09-28 NOTE — TELEPHONE ENCOUNTER
Brigit Mejias is calling to request a refill on the following medication(s):    Medication Request:  Requested Prescriptions     Pending Prescriptions Disp Refills    oxyCODONE-acetaminophen (PERCOCET) 5-325 MG per tablet 28 tablet 0     Sig: Take 1 tablet by mouth every 6 hours as needed for Pain for up to 7 days. Intended supply: 7 days.  Take lowest dose possible to manage pain       Last Visit Date (If Applicable):  1/87/1645    Next Visit Date:    10/20/2020

## 2020-09-28 NOTE — TELEPHONE ENCOUNTER
Josh Eric is calling to request a refill on the following medication(s):    Medication Request:  Requested Prescriptions     Pending Prescriptions Disp Refills    TRUE METRIX BLOOD GLUCOSE TEST strip [Pharmacy Med Name: True Metrix Blood Glucose Test In Vitro Strip] 100 each 0     Sig: USE  STRIP TO CHECK GLUCOSE TWICE DAILY       Last Visit Date (If Applicable):  4/39/9629    Next Visit Date:    9/28/2020

## 2020-09-30 ENCOUNTER — HOSPITAL ENCOUNTER (OUTPATIENT)
Facility: MEDICAL CENTER | Age: 80
Discharge: HOME | End: 2020-09-30
Payer: MEDICARE

## 2020-09-30 ENCOUNTER — TELEPHONE (OUTPATIENT)
Dept: INFUSION THERAPY | Facility: MEDICAL CENTER | Age: 80
End: 2020-09-30

## 2020-09-30 DIAGNOSIS — C24.1 AMPULLARY CARCINOMA (HCC): ICD-10-CM

## 2020-09-30 LAB
ABSOLUTE EOS #: 0.06 K/UL (ref 0–0.44)
ABSOLUTE IMMATURE GRANULOCYTE: 0.05 K/UL (ref 0–0.3)
ABSOLUTE LYMPH #: 2.31 K/UL (ref 1.1–3.7)
ABSOLUTE MONO #: 0.73 K/UL (ref 0.1–1.2)
ALBUMIN SERPL-MCNC: 3 G/DL (ref 3.5–5.2)
ALBUMIN/GLOBULIN RATIO: ABNORMAL (ref 1–2.5)
ALP BLD-CCNC: 371 U/L (ref 35–104)
ALT SERPL-CCNC: 60 U/L (ref 5–33)
ANION GAP SERPL CALCULATED.3IONS-SCNC: 9 MMOL/L (ref 9–17)
AST SERPL-CCNC: 136 U/L
BASOPHILS # BLD: 1 % (ref 0–2)
BASOPHILS ABSOLUTE: 0.05 K/UL (ref 0–0.2)
BILIRUB SERPL-MCNC: 0.2 MG/DL (ref 0.3–1.2)
BUN BLDV-MCNC: 10 MG/DL (ref 8–23)
BUN/CREAT BLD: 21 (ref 9–20)
CA 19-9: 29 U/ML (ref 0–35)
CALCIUM SERPL-MCNC: 8.4 MG/DL (ref 8.6–10.4)
CHLORIDE BLD-SCNC: 101 MMOL/L (ref 98–107)
CO2: 28 MMOL/L (ref 20–31)
CREAT SERPL-MCNC: 0.47 MG/DL (ref 0.5–0.9)
DIFFERENTIAL TYPE: ABNORMAL
EOSINOPHILS RELATIVE PERCENT: 1 % (ref 1–4)
GFR AFRICAN AMERICAN: >60 ML/MIN
GFR NON-AFRICAN AMERICAN: >60 ML/MIN
GFR SERPL CREATININE-BSD FRML MDRD: ABNORMAL ML/MIN/{1.73_M2}
GFR SERPL CREATININE-BSD FRML MDRD: ABNORMAL ML/MIN/{1.73_M2}
GLUCOSE BLD-MCNC: 405 MG/DL (ref 70–99)
HCT VFR BLD CALC: 34.4 % (ref 36.3–47.1)
HEMOGLOBIN: 10.3 G/DL (ref 11.9–15.1)
IMMATURE GRANULOCYTES: 1 %
LYMPHOCYTES # BLD: 22 % (ref 24–43)
MCH RBC QN AUTO: 25.8 PG (ref 25.2–33.5)
MCHC RBC AUTO-ENTMCNC: 29.9 G/DL (ref 28.4–34.8)
MCV RBC AUTO: 86.2 FL (ref 82.6–102.9)
MONOCYTES # BLD: 7 % (ref 3–12)
NRBC AUTOMATED: 0 PER 100 WBC
PDW BLD-RTO: 15.9 % (ref 11.8–14.4)
PLATELET # BLD: 417 K/UL (ref 138–453)
PLATELET ESTIMATE: ABNORMAL
PMV BLD AUTO: 9.6 FL (ref 8.1–13.5)
POTASSIUM SERPL-SCNC: 4.4 MMOL/L (ref 3.7–5.3)
RBC # BLD: 3.99 M/UL (ref 3.95–5.11)
RBC # BLD: ABNORMAL 10*6/UL
SEG NEUTROPHILS: 68 % (ref 36–65)
SEGMENTED NEUTROPHILS ABSOLUTE COUNT: 7.12 K/UL (ref 1.5–8.1)
SODIUM BLD-SCNC: 138 MMOL/L (ref 135–144)
TOTAL PROTEIN: 6.1 G/DL (ref 6.4–8.3)
WBC # BLD: 10.3 K/UL (ref 3.5–11.3)
WBC # BLD: ABNORMAL 10*3/UL

## 2020-09-30 PROCEDURE — 80053 COMPREHEN METABOLIC PANEL: CPT

## 2020-09-30 PROCEDURE — 85025 COMPLETE CBC W/AUTO DIFF WBC: CPT

## 2020-09-30 PROCEDURE — 86301 IMMUNOASSAY TUMOR CA 19-9: CPT

## 2020-09-30 PROCEDURE — 36415 COLL VENOUS BLD VENIPUNCTURE: CPT

## 2020-09-30 NOTE — TELEPHONE ENCOUNTER
Recieved a call from lab ,crtitcal Glucose of  405. Spoke with Dr. Christopher Esposito . Orders recieved to call  Pt and have her touch base with her PCP .

## 2020-10-01 ENCOUNTER — HOSPITAL ENCOUNTER (OUTPATIENT)
Facility: MEDICAL CENTER | Age: 80
End: 2020-10-01
Payer: MEDICARE

## 2020-10-05 ENCOUNTER — TELEPHONE (OUTPATIENT)
Dept: INFUSION THERAPY | Facility: MEDICAL CENTER | Age: 80
End: 2020-10-05

## 2020-10-05 NOTE — TELEPHONE ENCOUNTER
Safia HARDY calling triage line reporting that she cant get ride to doctor appointment on Wednesday  Would like to discuss lab results that were drawn with MD  Does not have correct phone for virtual visit    Writer calls and speaks with Safia  She is full time caregiver for her   She does not have a ride here  Would like to know what lab results are    Note and lab results placed in physician rack to review  Please advise

## 2020-10-07 ENCOUNTER — TELEPHONE (OUTPATIENT)
Dept: ONCOLOGY | Age: 80
End: 2020-10-07

## 2020-10-08 ENCOUNTER — TELEPHONE (OUTPATIENT)
Dept: ONCOLOGY | Age: 80
End: 2020-10-08

## 2020-10-08 NOTE — TELEPHONE ENCOUNTER
SPOKE WITH AISHWARYA VIA PHONE THIS AM.  REVIEWED HER RECENT LABS. SHE IS VERY NERVOUS R/T NOT SEEING MD HOWEVER EXPLAINED [facility]  AND HER ADULT CHILDREN WHO WORK 12AM - 12PM MUST PROVIDE TRANSPORTATION. .    RE SCHEDULED LAB AND MD EXAM FOR 11/18/2020 @1330. IT IS TOO DIFFICULT FOR HER TO COME ON SEPARATE DAYS FOR HER LABS. PINK SLIP TO CLERICAL TO PLACE ON SCHEDULE.

## 2020-10-13 ENCOUNTER — TELEPHONE (OUTPATIENT)
Dept: FAMILY MEDICINE CLINIC | Age: 80
End: 2020-10-13

## 2020-10-14 ENCOUNTER — TELEPHONE (OUTPATIENT)
Dept: ONCOLOGY | Age: 80
End: 2020-10-14

## 2020-10-14 NOTE — TELEPHONE ENCOUNTER
REVIEWED LABS AND RECENT CONVERSATION WITH PT TO MD.    IN AGREEMENT TO PLAN. LABS STABLE. NO NEW ORDERS RECEIVED.

## 2020-10-20 ENCOUNTER — OFFICE VISIT (OUTPATIENT)
Dept: FAMILY MEDICINE CLINIC | Age: 80
End: 2020-10-20
Payer: MEDICARE

## 2020-10-20 ENCOUNTER — HOSPITAL ENCOUNTER (OUTPATIENT)
Age: 80
Setting detail: SPECIMEN
Discharge: HOME | End: 2020-10-20
Payer: MEDICARE

## 2020-10-20 VITALS
WEIGHT: 128 LBS | HEART RATE: 104 BPM | OXYGEN SATURATION: 98 % | DIASTOLIC BLOOD PRESSURE: 80 MMHG | BODY MASS INDEX: 24.19 KG/M2 | TEMPERATURE: 97.4 F | SYSTOLIC BLOOD PRESSURE: 130 MMHG

## 2020-10-20 LAB
ABSOLUTE EOS #: 0.11 K/UL (ref 0–0.44)
ABSOLUTE IMMATURE GRANULOCYTE: 0.05 K/UL (ref 0–0.3)
ABSOLUTE LYMPH #: 3.34 K/UL (ref 1.1–3.7)
ABSOLUTE MONO #: 0.76 K/UL (ref 0.1–1.2)
ALBUMIN SERPL-MCNC: 3 G/DL (ref 3.5–5.2)
ALBUMIN/GLOBULIN RATIO: 0.9 (ref 1–2.5)
ALP BLD-CCNC: 441 U/L (ref 35–104)
ALT SERPL-CCNC: 62 U/L (ref 5–33)
ANION GAP SERPL CALCULATED.3IONS-SCNC: 11 MMOL/L (ref 9–17)
AST SERPL-CCNC: 84 U/L
BASOPHILS # BLD: 0 % (ref 0–2)
BASOPHILS ABSOLUTE: 0.05 K/UL (ref 0–0.2)
BILIRUB SERPL-MCNC: 0.3 MG/DL (ref 0.3–1.2)
BUN BLDV-MCNC: 9 MG/DL (ref 8–23)
BUN/CREAT BLD: ABNORMAL (ref 9–20)
CALCIUM SERPL-MCNC: 8.8 MG/DL (ref 8.6–10.4)
CHLORIDE BLD-SCNC: 102 MMOL/L (ref 98–107)
CHOLESTEROL/HDL RATIO: 2.6
CHOLESTEROL: 99 MG/DL
CO2: 27 MMOL/L (ref 20–31)
CREAT SERPL-MCNC: 0.38 MG/DL (ref 0.5–0.9)
DIFFERENTIAL TYPE: ABNORMAL
EOSINOPHILS RELATIVE PERCENT: 1 % (ref 1–4)
ESTIMATED AVERAGE GLUCOSE: 249 MG/DL
GFR AFRICAN AMERICAN: >60 ML/MIN
GFR NON-AFRICAN AMERICAN: >60 ML/MIN
GFR SERPL CREATININE-BSD FRML MDRD: ABNORMAL ML/MIN/{1.73_M2}
GFR SERPL CREATININE-BSD FRML MDRD: ABNORMAL ML/MIN/{1.73_M2}
GLUCOSE BLD-MCNC: 210 MG/DL (ref 70–99)
HBA1C MFR BLD: 10.3 % (ref 4–6)
HCT VFR BLD CALC: 36.7 % (ref 36.3–47.1)
HDLC SERPL-MCNC: 38 MG/DL
HEMOGLOBIN: 10.8 G/DL (ref 11.9–15.1)
IMMATURE GRANULOCYTES: 0 %
LDL CHOLESTEROL: 46 MG/DL (ref 0–130)
LYMPHOCYTES # BLD: 27 % (ref 24–43)
MCH RBC QN AUTO: 25.7 PG (ref 25.2–33.5)
MCHC RBC AUTO-ENTMCNC: 29.4 G/DL (ref 28.4–34.8)
MCV RBC AUTO: 87.4 FL (ref 82.6–102.9)
MONOCYTES # BLD: 6 % (ref 3–12)
NRBC AUTOMATED: 0 PER 100 WBC
PDW BLD-RTO: 17 % (ref 11.8–14.4)
PLATELET # BLD: 503 K/UL (ref 138–453)
PLATELET ESTIMATE: ABNORMAL
PMV BLD AUTO: 11 FL (ref 8.1–13.5)
POTASSIUM SERPL-SCNC: 4.3 MMOL/L (ref 3.7–5.3)
RBC # BLD: 4.2 M/UL (ref 3.95–5.11)
RBC # BLD: ABNORMAL 10*6/UL
SEG NEUTROPHILS: 66 % (ref 36–65)
SEGMENTED NEUTROPHILS ABSOLUTE COUNT: 8.16 K/UL (ref 1.5–8.1)
SODIUM BLD-SCNC: 140 MMOL/L (ref 135–144)
TOTAL PROTEIN: 6.3 G/DL (ref 6.4–8.3)
TRIGL SERPL-MCNC: 77 MG/DL
VLDLC SERPL CALC-MCNC: ABNORMAL MG/DL (ref 1–30)
WBC # BLD: 12.5 K/UL (ref 3.5–11.3)
WBC # BLD: ABNORMAL 10*3/UL

## 2020-10-20 PROCEDURE — G8427 DOCREV CUR MEDS BY ELIG CLIN: HCPCS | Performed by: FAMILY MEDICINE

## 2020-10-20 PROCEDURE — 99213 OFFICE O/P EST LOW 20 MIN: CPT | Performed by: FAMILY MEDICINE

## 2020-10-20 PROCEDURE — G8482 FLU IMMUNIZE ORDER/ADMIN: HCPCS | Performed by: FAMILY MEDICINE

## 2020-10-20 PROCEDURE — 1090F PRES/ABSN URINE INCON ASSESS: CPT | Performed by: FAMILY MEDICINE

## 2020-10-20 PROCEDURE — 1123F ACP DISCUSS/DSCN MKR DOCD: CPT | Performed by: FAMILY MEDICINE

## 2020-10-20 PROCEDURE — 4040F PNEUMOC VAC/ADMIN/RCVD: CPT | Performed by: FAMILY MEDICINE

## 2020-10-20 PROCEDURE — 1036F TOBACCO NON-USER: CPT | Performed by: FAMILY MEDICINE

## 2020-10-20 PROCEDURE — G8400 PT W/DXA NO RESULTS DOC: HCPCS | Performed by: FAMILY MEDICINE

## 2020-10-20 PROCEDURE — G0008 ADMIN INFLUENZA VIRUS VAC: HCPCS | Performed by: FAMILY MEDICINE

## 2020-10-20 PROCEDURE — 90688 IIV4 VACCINE SPLT 0.5 ML IM: CPT | Performed by: FAMILY MEDICINE

## 2020-10-20 PROCEDURE — G8420 CALC BMI NORM PARAMETERS: HCPCS | Performed by: FAMILY MEDICINE

## 2020-10-20 RX ORDER — GABAPENTIN 100 MG/1
100 CAPSULE ORAL 3 TIMES DAILY PRN
Qty: 90 CAPSULE | Refills: 0 | Status: SHIPPED | OUTPATIENT
Start: 2020-10-20 | End: 2020-11-18 | Stop reason: DRUGHIGH

## 2020-10-20 ASSESSMENT — ENCOUNTER SYMPTOMS
SORE THROAT: 0
COUGH: 0
SHORTNESS OF BREATH: 0
DIARRHEA: 0
BACK PAIN: 0
NAUSEA: 0
VOMITING: 0
SINUS PRESSURE: 0

## 2020-10-20 ASSESSMENT — PATIENT HEALTH QUESTIONNAIRE - PHQ9
2. FEELING DOWN, DEPRESSED OR HOPELESS: 0
SUM OF ALL RESPONSES TO PHQ QUESTIONS 1-9: 0
1. LITTLE INTEREST OR PLEASURE IN DOING THINGS: 0
SUM OF ALL RESPONSES TO PHQ9 QUESTIONS 1 & 2: 0
SUM OF ALL RESPONSES TO PHQ QUESTIONS 1-9: 0
SUM OF ALL RESPONSES TO PHQ QUESTIONS 1-9: 0

## 2020-10-20 NOTE — PROGRESS NOTES
Melvina Priest is a 79 y.o. female who presents todayfor her medical conditions/complaints as noted below. Melvina Priest is here today c/oCheck-Up      :     HPI    Routine she had labs done this morning and are pending. 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    Duodenal adenoma 2017    Hyperlipidemia     Hypertension     Osteoarthritis       Past Surgical History:   Procedure Laterality Date    ABDOMEN SURGERY  04/15/2020    Whipple Surgery     BREAST SURGERY      COLONOSCOPY      last one 4 years, pt is unsure of where    [place] EGD REMOVAL TUMOR POLYP/OTHER LESION SNARE TECH N/A 4/3/2017    EGD POLYP SNARE performed by Terrence Abarca MD at [address] [place] [address]  2017    In the 2nd part of the duodenum there is a 3-4 cm mass lesion seen, occupies about half of the lumen-pathology--adenoma    UPPER GASTROINTESTINAL ENDOSCOPY N/A 2020    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     Family History   Problem Relation Age of Onset    Heart Disease Mother     No Known Problems Father      Social History     Tobacco Use    Smoking status: Former Smoker     Packs/day: 1.00     Years: 27.00     Pack years: 27.00     Types: Cigarettes     Last attempt to quit: 10/15/2003     Years since quittin.0    Smokeless tobacco: Never Used   Substance Use Topics    Alcohol use: No      Current Outpatient Medications   Medication Sig Dispense Refill    gabapentin (NEURONTIN) 100 MG capsule Take 1 capsule by mouth 3 times daily as needed (nerve pain) for up to 30 days.  Intended supply: 30 days 90 capsule 0    TRUE METRIX BLOOD GLUCOSE TEST strip USE  STRIP TO CHECK GLUCOSE TWICE DAILY 100 each 0    insulin 70-30 (NOVOLIN 70/30) (70-30) 100 UNIT per ML injection vial 30 units in the morning and 10 units pm before meals. 4 vial 1    insulin 70-30 (NOVOLIN 70/30 RELION) (70-30) 100 UNIT per ML injection vial INJECT 30 UNITS SUBCUTANEOUSLY IN THE MORNING AND  INJECT  10  UNITS  IN  THE  EVENING 40 mL 0    meclizine (ANTIVERT) 12.5 MG tablet TAKE 1 TABLET THREE TIMES DAILY AS NEEDED 270 tablet 1    atorvastatin (LIPITOR) 10 MG tablet TAKE 1 TABLET EVERY DAY 90 tablet 3    lisinopril (PRINIVIL;ZESTRIL) 10 MG tablet TAKE 1 TABLET EVERY DAY 90 tablet 3    DROPLET INSULIN SYRINGE 30G X 5/16\" 1 ML MISC USE TO INJECT TWICE DAILY 200 each 5    aspirin 325 MG tablet Take 325 mg by mouth daily. No current facility-administered medications for this visit. Allergies   Allergen Reactions    Nubain [Nalbuphine Hcl]     Hydroxyzine Other (See Comments), Nausea Only and Nausea And Vomiting    Vistaril [Hydroxyzine Hcl] Nausea And Vomiting         Subjective:   Review of Systems   Constitutional: Positive for appetite change, fatigue and unexpected weight change. Negative for chills, diaphoresis and fever. HENT: Negative for congestion, sinus pressure and sore throat. Eyes: Negative for visual disturbance. Respiratory: Negative for cough and shortness of breath. Cardiovascular: Negative for chest pain and leg swelling. Gastrointestinal: Negative for diarrhea, nausea and vomiting. Genitourinary: Negative for dysuria, menstrual problem, urgency and vaginal discharge. Musculoskeletal: Positive for gait problem, neck pain and neck stiffness. Negative for arthralgias, back pain and myalgias. Skin: Negative for rash. Neurological: Negative for weakness, numbness and headaches. Psychiatric/Behavioral: Positive for sleep disturbance.       :   /80   Pulse 104   Temp 97.4 °F (36.3 °C)   Wt 128 lb (58.1 kg)   SpO2 98%   BMI 24.19 kg/m²     Physical Exam  Constitutional:       General: She is not in acute distress. Appearance: She is well-developed. She is ill-appearing.  She is not

## 2020-10-22 ENCOUNTER — TELEPHONE (OUTPATIENT)
Dept: FAMILY MEDICINE CLINIC | Age: 80
End: 2020-10-22

## 2020-10-22 NOTE — TELEPHONE ENCOUNTER
Prescription below pended:    Medication Request:  Requested Prescriptions     Pending Prescriptions Disp Refills    lipase-protease-amylase (CREON) 99989 units delayed release capsule 240 capsule 3     Sig: Take 2 capsules by mouth three times daily (with meals). And then take 1 capsule by mouth with snacks. Last Visit Date (If Applicable):  05/03/4431    Next Visit Date:    Visit date not found      Karina Mortensenjeb GONZALEZ, [address]  Medication Management Service  [phone]  10/22/2020  2:01 PM

## 2020-10-22 NOTE — TELEPHONE ENCOUNTER
Medication Management Service ([address] [place] [address])  [facility]  [phone]     CLINICAL PHARMACY NOTE:      Request received from patient's PCP, BENJY Stark MD, regarding available options for pancrelipase products and dosing. Patient s/p marimar in April 2020 and now with reported weight loss. Patient currently has [employer]. Formulary reviewed and Creon is preferred product and is considered a Tier 3 Medication. Erica Vinson also on formulary but as a Tier 4. Per [employer], recommended starting dosing for pancrelipase is Lipase 500units/kg per meal and 1/2 the dose for snack. Using patient's current weight (58.1kg) patient would need approximate 29,000 lipase units per meal and then 14,500 lipase units with snack. Spoke with patient by phone. Patient reports eating 3 meals per day. Breakfast may contain sausage egg with toast or oatmeal.  Lunch around 3:00pm will be something like cottage cheese with fruit and then dinner is usually a [employer] frozen item. Patient expressed concerns regarding co-pays and medication cost.      Above information shared with PCP. Plan will be to send prescriptions to patient's preferred pharmacy ([facility] on [address]) for Creon 12,000 units- 2 capsules with meals and 1 capsule with snacks. Rx pended for PCP for review. Shay Sierra, Pharm. D., [address] [facility] Medication Management Service  [phone]  10/22/2020  1:49 PM    ============================================================================  CLINICAL PHARMACY CONSULT: MED RECONCILIATION/REVIEW ADDENDUM    For Pharmacy Admin Tracking Only    PHSO: No  Total # of Interventions Recommended: 1  - New Order #: 1 New Medication Order Reason(s): Needs Additional Medication Therapy  Total Interventions Accepted: 1  Time Spent (min): [address], PharmD

## 2020-10-26 ENCOUNTER — TELEPHONE (OUTPATIENT)
Dept: FAMILY MEDICINE CLINIC | Age: 80
End: 2020-10-26

## 2020-11-17 ENCOUNTER — TELEPHONE (OUTPATIENT)
Dept: FAMILY MEDICINE CLINIC | Age: 80
End: 2020-11-17

## 2020-11-17 RX ORDER — GABAPENTIN 300 MG/1
300 CAPSULE ORAL 3 TIMES DAILY
COMMUNITY
End: 2020-11-17 | Stop reason: SDUPTHER

## 2020-11-17 RX ORDER — CALCIUM CITRATE/VITAMIN D3 200MG-6.25
TABLET ORAL
Qty: 100 EACH | Refills: 0 | Status: SHIPPED | OUTPATIENT
Start: 2020-11-17 | End: 2021-01-27 | Stop reason: SDUPTHER

## 2020-11-17 RX ORDER — GABAPENTIN 300 MG/1
300 CAPSULE ORAL 3 TIMES DAILY
Qty: 90 CAPSULE | Refills: 3 | Status: SHIPPED | OUTPATIENT
Start: 2020-11-17 | End: 2020-12-23

## 2020-11-17 NOTE — TELEPHONE ENCOUNTER
Patient called because she was taking gabapentin 300mg but, when she got the new prescription it was for 100mg.    She states \"this isn't doing anything\"   She wants to know why it was changed  Please advise

## 2020-11-17 NOTE — TELEPHONE ENCOUNTER
Royer Gutiérrez is calling to request a refill on the following medication(s):    Medication Request:  Requested Prescriptions     Pending Prescriptions Disp Refills    TRUE METRIX BLOOD GLUCOSE TEST strip [Pharmacy Med Name: True Metrix Blood Glucose Test In Vitro Strip] 100 each 0     Sig: USE  STRIP TO CHECK GLUCOSE TWICE DAILY       Last Visit Date (If Applicable):  09/23/4608    Next Visit Date:    11/17/2020

## 2020-11-17 NOTE — TELEPHONE ENCOUNTER
It appears she was on 300 mg pre 2019 and no Rx til October 2020 when it was started at 100 mg tid which is generally where I start with new starts. I am ok with next Rx giving her the 300 mg strength. She may take 3 100's for now if she wishes. Load new Rx for 300 if she wishes this dose.

## 2020-11-17 NOTE — TELEPHONE ENCOUNTER
Abe Huynh is calling to request a refill on the following medication(s):    Medication Request:  Requested Prescriptions     Pending Prescriptions Disp Refills    gabapentin (NEURONTIN) 300 MG capsule 90 capsule      Sig: Take 1 capsule by mouth 3 times daily.        Last Visit Date (If Applicable):  09/25/9780    Next Visit Date:    Visit date not found

## 2020-11-18 ENCOUNTER — TELEPHONE (OUTPATIENT)
Dept: ONCOLOGY | Age: 80
End: 2020-11-18

## 2020-11-18 ENCOUNTER — HOSPITAL ENCOUNTER (OUTPATIENT)
Facility: MEDICAL CENTER | Age: 80
Discharge: HOME | End: 2020-11-18
Payer: MEDICARE

## 2020-11-18 ENCOUNTER — OFFICE VISIT (OUTPATIENT)
Dept: ONCOLOGY | Age: 80
End: 2020-11-18
Payer: MEDICARE

## 2020-11-18 VITALS
TEMPERATURE: 97.8 F | WEIGHT: 129.7 LBS | BODY MASS INDEX: 24.51 KG/M2 | HEART RATE: 97 BPM | RESPIRATION RATE: 16 BRPM | SYSTOLIC BLOOD PRESSURE: 147 MMHG | DIASTOLIC BLOOD PRESSURE: 75 MMHG

## 2020-11-18 DIAGNOSIS — C24.1 AMPULLARY CARCINOMA (HCC): ICD-10-CM

## 2020-11-18 LAB
ABSOLUTE EOS #: 0.09 K/UL (ref 0–0.44)
ABSOLUTE IMMATURE GRANULOCYTE: 0 K/UL (ref 0–0.3)
ABSOLUTE LYMPH #: 2.49 K/UL (ref 1.1–3.7)
ABSOLUTE MONO #: 0.71 K/UL (ref 0.1–1.2)
ALBUMIN SERPL-MCNC: 3.7 G/DL (ref 3.5–5.2)
ALBUMIN/GLOBULIN RATIO: ABNORMAL (ref 1–2.5)
ALP BLD-CCNC: 255 U/L (ref 35–104)
ALT SERPL-CCNC: 25 U/L (ref 5–33)
ANION GAP SERPL CALCULATED.3IONS-SCNC: 8 MMOL/L (ref 9–17)
AST SERPL-CCNC: 33 U/L
BASOPHILS # BLD: 1 % (ref 0–2)
BASOPHILS ABSOLUTE: 0.09 K/UL (ref 0–0.2)
BILIRUB SERPL-MCNC: 0.22 MG/DL (ref 0.3–1.2)
BUN BLDV-MCNC: 9 MG/DL (ref 8–23)
BUN/CREAT BLD: ABNORMAL (ref 9–20)
CA 19-9: 28 U/ML (ref 0–35)
CALCIUM SERPL-MCNC: 8.8 MG/DL (ref 8.6–10.4)
CHLORIDE BLD-SCNC: 101 MMOL/L (ref 98–107)
CO2: 27 MMOL/L (ref 20–31)
CREAT SERPL-MCNC: <0.4 MG/DL (ref 0.5–0.9)
DIFFERENTIAL TYPE: ABNORMAL
EOSINOPHILS RELATIVE PERCENT: 1 % (ref 1–4)
GFR AFRICAN AMERICAN: ABNORMAL ML/MIN
GFR NON-AFRICAN AMERICAN: ABNORMAL ML/MIN
GFR SERPL CREATININE-BSD FRML MDRD: ABNORMAL ML/MIN/{1.73_M2}
GFR SERPL CREATININE-BSD FRML MDRD: ABNORMAL ML/MIN/{1.73_M2}
GLUCOSE BLD-MCNC: 365 MG/DL (ref 70–99)
HCT VFR BLD CALC: 33.6 % (ref 36.3–47.1)
HEMOGLOBIN: 9.5 G/DL (ref 11.9–15.1)
IMMATURE GRANULOCYTES: 0 %
LYMPHOCYTES # BLD: 28 % (ref 24–43)
MCH RBC QN AUTO: 25.1 PG (ref 25.2–33.5)
MCHC RBC AUTO-ENTMCNC: 28.3 G/DL (ref 28.4–34.8)
MCV RBC AUTO: 88.7 FL (ref 82.6–102.9)
MONOCYTES # BLD: 8 % (ref 3–12)
NRBC AUTOMATED: 0 PER 100 WBC
PDW BLD-RTO: 15.9 % (ref 11.8–14.4)
PLATELET # BLD: 425 K/UL (ref 138–453)
PLATELET ESTIMATE: ABNORMAL
PMV BLD AUTO: 9.8 FL (ref 8.1–13.5)
POTASSIUM SERPL-SCNC: 3.9 MMOL/L (ref 3.7–5.3)
RBC # BLD: 3.79 M/UL (ref 3.95–5.11)
RBC # BLD: ABNORMAL 10*6/UL
SEG NEUTROPHILS: 62 % (ref 36–65)
SEGMENTED NEUTROPHILS ABSOLUTE COUNT: 5.52 K/UL (ref 1.5–8.1)
SODIUM BLD-SCNC: 136 MMOL/L (ref 135–144)
TOTAL PROTEIN: 6.8 G/DL (ref 6.4–8.3)
WBC # BLD: 8.9 K/UL (ref 3.5–11.3)
WBC # BLD: ABNORMAL 10*3/UL

## 2020-11-18 PROCEDURE — 86301 IMMUNOASSAY TUMOR CA 19-9: CPT

## 2020-11-18 PROCEDURE — 85025 COMPLETE CBC W/AUTO DIFF WBC: CPT

## 2020-11-18 PROCEDURE — G8400 PT W/DXA NO RESULTS DOC: HCPCS | Performed by: INTERNAL MEDICINE

## 2020-11-18 PROCEDURE — 4040F PNEUMOC VAC/ADMIN/RCVD: CPT | Performed by: INTERNAL MEDICINE

## 2020-11-18 PROCEDURE — 1036F TOBACCO NON-USER: CPT | Performed by: INTERNAL MEDICINE

## 2020-11-18 PROCEDURE — 36415 COLL VENOUS BLD VENIPUNCTURE: CPT

## 2020-11-18 PROCEDURE — 99214 OFFICE O/P EST MOD 30 MIN: CPT | Performed by: INTERNAL MEDICINE

## 2020-11-18 PROCEDURE — 99211 OFF/OP EST MAY X REQ PHY/QHP: CPT | Performed by: INTERNAL MEDICINE

## 2020-11-18 PROCEDURE — 80053 COMPREHEN METABOLIC PANEL: CPT

## 2020-11-18 PROCEDURE — 1123F ACP DISCUSS/DSCN MKR DOCD: CPT | Performed by: INTERNAL MEDICINE

## 2020-11-18 PROCEDURE — G8427 DOCREV CUR MEDS BY ELIG CLIN: HCPCS | Performed by: INTERNAL MEDICINE

## 2020-11-18 PROCEDURE — 1090F PRES/ABSN URINE INCON ASSESS: CPT | Performed by: INTERNAL MEDICINE

## 2020-11-18 PROCEDURE — G8482 FLU IMMUNIZE ORDER/ADMIN: HCPCS | Performed by: INTERNAL MEDICINE

## 2020-11-18 PROCEDURE — G8420 CALC BMI NORM PARAMETERS: HCPCS | Performed by: INTERNAL MEDICINE

## 2020-11-18 NOTE — PROGRESS NOTES
Patient ID: Hilda Rasmussen, 1940, [id number], 79 y.o. 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 female with diagnosis of ampullary carcinoma was seen during initial consultation visit. Patient has history of anemia and following with her PCP. She also had a EGD in 2017 which showed polypoid lesion in the duodenum and she was advised to follow with [facility] however patient seems like did not have a follow-up and recently in February she was seen by gastroenterology for working up for anemia. Patient had upper endoscopy on 2/13/2020 which showed malignant-looking lesion in the second portion of the duodenum. Final pathology showed duodenal adenocarcinoma. She had CT abdomen pelvis which showed duodenal mass at the level of ampulla with mild pancreatic and biliary ductal dilatation. She reports weight loss about 5 to 10 pounds prior to surgery. 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 He is accompanied by her son during today's office visit    INTERVAL HISTORY:  Patient is returning for follow-up visit and to discuss recent labs and further recommendations. She denies any pain, nausea vomiting. Her CA 19.9 is stable. During this visit patient's allergy, social, medical, surgical history and medications were reviewed and updated.     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    Hyperlipidemia     Hypertension     Osteoarthritis        Past Surgical History:   Procedure Laterality Date    ABDOMEN SURGERY  04/15/2020    Whipple Surgery     BREAST SURGERY      COLONOSCOPY      last one 4 years, pt is unsure of where    [place] EGD REMOVAL TUMOR POLYP/OTHER LESION SNARE TECH N/A 4/3/2017    EGD POLYP SNARE performed by Mark Gilmore MD at [address] [place] [address]  04/03/2017    In the 2nd part of the duodenum there is a 3-4 cm mass lesion seen, occupies about half of the lumen-pathology--adenoma    UPPER GASTROINTESTINAL ENDOSCOPY N/A 2/13/2020    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       Allergies   Allergen Reactions    Nubain [Nalbuphine Hcl]     Hydroxyzine Other (See Comments), Nausea Only and Nausea And Vomiting    Vistaril [Hydroxyzine Hcl] Nausea And Vomiting       Current Outpatient Medications   Medication Sig Dispense Refill    TRUE METRIX BLOOD GLUCOSE TEST strip USE  STRIP TO CHECK GLUCOSE TWICE DAILY 100 each 0    gabapentin (NEURONTIN) 300 MG capsule Take 1 capsule by mouth 3 times daily for 120 days. 90 capsule 3    lipase-protease-amylase (CREON) 42436 units delayed release capsule Take 2 capsules by mouth three times daily (with meals). And then take 1 capsule by mouth with snacks. 240 capsule 3    meclizine (ANTIVERT) 12.5 MG tablet TAKE 1 TABLET THREE TIMES DAILY AS NEEDED 270 tablet 1    atorvastatin (LIPITOR) 10 MG tablet TAKE 1 TABLET EVERY DAY 90 tablet 3    lisinopril (PRINIVIL;ZESTRIL) 10 MG tablet TAKE 1 TABLET EVERY DAY 90 tablet 3    DROPLET INSULIN SYRINGE 30G X 5/16\" 1 ML MISC USE TO INJECT TWICE DAILY 200 each 5    aspirin 325 MG tablet Take 325 mg by mouth daily.  insulin 70-30 (NOVOLIN 70/30 RELION) (70-30) 100 UNIT per ML injection vial INJECT 30 UNITS SUBCUTANEOUSLY IN THE MORNING AND  INJECT  10  UNITS  IN  THE  EVENING (Patient taking differently: INJECT 34 UNITS SUBCUTANEOUSLY IN THE MORNING AND  INJECT  14 UNITS  IN  THE  EVENING) 40 mL 0     No current facility-administered medications for this visit.         Social History Socioeconomic History    Marital status:      Spouse name: Not on file    Number of children: Not on file    Years of education: Not on file    Highest education level: Not on file   Occupational History    Not on file   Social Needs    Financial resource strain: Not on file    Food insecurity     Worry: Not on file     Inability: Not on file    Transportation needs     Medical: Not on file     Non-medical: Not on file   Tobacco Use    Smoking status: Former Smoker     Packs/day: 1.00     Years: 27.00     Pack years: 27.00     Types: Cigarettes     Last attempt to quit: 10/15/2003     Years since quittin.1    Smokeless tobacco: Never Used   Substance and Sexual Activity    Alcohol use: No    Drug use: No    Sexual activity: Not on file   Lifestyle    Physical activity     Days per week: Not on file     Minutes per session: Not on file    Stress: Not on file   Relationships    Social connections     Talks on phone: Not on file     Gets together: Not on file     Attends Congregational service: Not on file     Active member of club or organization: Not on file     Attends meetings of clubs or organizations: Not on file     Relationship status: Not on file    Intimate partner violence     Fear of current or ex partner: Not on file     Emotionally abused: Not on file     Physically abused: Not on file     Forced sexual activity: Not on file   Other Topics Concern    Not on file   Social History Narrative    Not on file       Family History   Problem Relation Age of Onset    Heart Disease Mother     No Known Problems Father         REVIEW OF SYSTEM:     Constitutional: No fever or chills.  No night sweats, no weight loss   Eyes: No eye discharge, double vision, or eye pain   HEENT: negative for sore mouth, sore throat, hoarseness and voice change   Respiratory: negative for cough , sputum, dyspnea, wheezing, hemoptysis, chest pain   Cardiovascular: negative for chest pain, dyspnea, palpitations, orthopnea, PND   Gastrointestinal: negative for nausea, vomiting, diarrhea, constipation, abdominal pain, Dysphagia, hematemesis and hematochezia   Genitourinary: negative for frequency, dysuria, nocturia, urinary incontinence, and hematuria   Integument: negative for rash, skin lesions, bruises.    Hematologic/Lymphatic: negative for easy bruising, bleeding, lymphadenopathy, petechiae and swelling/edema   Endocrine: negative for heat or cold intolerance, tremor, weight changes, change in bowel habits and hair loss   Musculoskeletal: negative for myalgias, arthralgias, pain, joint swelling,and bone pain   Neurological: negative for headaches, dizziness, seizures, weakness, numbness       OBJECTIVE:         Vitals:    11/18/20 1408   BP: (!) 147/75   Pulse:    Resp:    Temp:        PHYSICAL EXAM:   General appearance - well appearing, no in pain or distress   Mental status - alert and cooperative   Eyes - pupils equal and reactive, extraocular eye movements intact   Ears - bilateral TM's and external ear canals normal   Mouth - mucous membranes moist, pharynx normal without lesions   Neck - supple, no significant adenopathy   Lymphatics - no palpable lymphadenopathy, no hepatosplenomegaly   Chest - clear to auscultation, no wheezes, rales or rhonchi, symmetric air entry   Heart - normal rate, regular rhythm, normal S1, S2, no murmurs, rubs, clicks or gallops   Abdomen - soft, nontender, nondistended, no masses or organomegaly   Neurological - alert, oriented, normal speech, no focal findings or movement disorder noted   Musculoskeletal - no joint tenderness, deformity or swelling   Extremities - peripheral pulses normal, no pedal edema, no clubbing or cyanosis   Skin - normal coloration and turgor, no rashes, no suspicious skin lesions noted   LABORATORY DATA:     Lab Results   Component Value Date    WBC 8.9 11/18/2020    HGB 9.5 (L) 11/18/2020    HCT 33.6 (L) 11/18/2020    MCV 88.7 11/18/2020    PLT 425 11/18/2020    LYMPHOPCT 28 11/18/2020    RBC 3.79 (L) 11/18/2020    MCH 25.1 (L) 11/18/2020    MCHC 28.3 (L) 11/18/2020    RDW 15.9 (H) 11/18/2020    NEUTOPHILPCT 63.1 02/26/2020    MONOPCT 8 11/18/2020    EOSPCT 2.9 02/26/2020    BASOPCT 1 11/18/2020    NEUTROABS 5.52 11/18/2020    LYMPHSABS 2.49 11/18/2020    MONOSABS 0.71 11/18/2020    EOSABS 0.09 11/18/2020    BASOSABS 0.09 11/18/2020         Chemistry        Component Value Date/Time     11/18/2020 1328    K 3.9 11/18/2020 1328     11/18/2020 1328    CO2 27 11/18/2020 1328    BUN 9 11/18/2020 1328    CREATININE <0.40 (L) 11/18/2020 1328        Component Value Date/Time    CALCIUM 8.8 11/18/2020 1328    ALKPHOS 255 (H) 11/18/2020 1328    AST 33 (H) 11/18/2020 1328    ALT 25 11/18/2020 1328    BILITOT 0.22 (L) 11/18/2020 1328            PATHOLOGY DATA:   SURGICAL PATHOLOGY CONSULTATION Collected: 2/13/2020   -- Diagnosis --   DUODENUM, BIOPSIES:        -  WELL-TO MODERATELY DIFFERENTIATED INVASIVE ADENOCARCINOMA. Pathology report, [facility] 4/15/20:  Final diagnosis:  A. Stomach, small intestine, and head of pancreas, Whipple resection:  Intra-and periampullary adenocarcinoma, moderately differentiated, mixed intestinal and pancreaticobiliary type, invasive carcinoma length 2.5 cm, carcinoma invades duodenal submucosa and muscularis propria. Lymphovascular invasion present present, duodenal and intra-ampullary adenomatous change margins are negative for invasive carcinoma and high-grade dysplasia  Metastatic  Carcinoma, 4 out of 18 lymph nodes  B. Gallbladder, resection:  Chronic cholecystitis with adenomyoma, negative for malignancy or dysplasia  C; lymph node, hepatic artery excision:  Lymph node with lipogranulomata, negative for malignancy  D.   Lymph nodes, portal, regional resection:  8 lymph nodes with lipogranulomata, negative for malignancy  E.  Bile duct, final margin, excision:  Benign bile duct and peripheral nerves, understand the risk and benefits  Her scan in August showed stable disease however her CA19.9 is now better  She continued to refuse chemotherapy  Return to clinic in 3 months  with labs prior    Mckay Torres MD  Hematologist/Medical Oncologist      This note is created with the assistance of a speech recognition program.  While intending to generate a document that actually reflects the content of the visit, the document can still have some errors including those of syntax and sound a like substitutions which may escape proof reading. It such instances, actual meaning can be extrapolated by contextual diversion.

## 2020-11-18 NOTE — TELEPHONE ENCOUNTER
AISHWARYA ARRIVES AMBULATORY FOR MD VISIT  DR Carlos Pettit IN TO SEE PT  ORDERS RECEIVED  RV IN 3 MTHS W/ LABS  LABS CDP CMP CA 19-9 2/3/21  MD VISIT 2/10/21 @ 1:30PM  SCRIPTS SENT TO PT PHARMACY  AVS PRINTED WITH INSTRUCTIONS GIVEN TO PT  PT DISCHARGED AMBULATORY

## 2020-11-19 ENCOUNTER — TELEPHONE (OUTPATIENT)
Dept: ONCOLOGY | Age: 80
End: 2020-11-19

## 2020-11-19 NOTE — TELEPHONE ENCOUNTER
AISHWARYA PHONED TO INQUIRE ON HER CA 19 9 RESULTS FROM 11/8/2020  CA 19-9 = 28    NEFF IS PLEASED WITH THIS UPDATE.

## 2020-12-10 ENCOUNTER — TELEPHONE (OUTPATIENT)
Dept: FAMILY MEDICINE CLINIC | Age: 80
End: 2020-12-10

## 2020-12-10 NOTE — TELEPHONE ENCOUNTER
Medication Management Service  [phone]  12/10/2020  11:44 AM      ==========================================================================  CLINICAL PHARMACY CONSULT: MED RECONCILIATION/REVIEW ADDENDUM    For Pharmacy Admin Tracking Only    PHSO: No  Total # of Interventions Recommended: 1  Total Interventions Accepted: 1  Time Spent (min): [address] PharmD

## 2020-12-15 NOTE — TELEPHONE ENCOUNTER
Bandar Mendez is calling to request a refill on the following medication(s):    Medication Request:  Requested Prescriptions     Pending Prescriptions Disp Refills    meclizine (ANTIVERT) 12.5 MG tablet 270 tablet 1     Sig: TAKE 1 TABLET THREE TIMES DAILY AS NEEDED       Last Visit Date (If Applicable):  80/00/5778    Next Visit Date:    Visit date not found

## 2020-12-16 RX ORDER — MECLIZINE HCL 12.5 MG/1
TABLET ORAL
Qty: 270 TABLET | Refills: 0 | Status: SHIPPED | OUTPATIENT
Start: 2020-12-16 | End: 2021-03-04

## 2020-12-17 ENCOUNTER — TELEPHONE (OUTPATIENT)
Dept: ONCOLOGY | Age: 80
End: 2020-12-17

## 2020-12-17 NOTE — TELEPHONE ENCOUNTER
Writer called patient to check on her. She states shes doing well. Shes states her  is still doing the same. She expressed her appreciation for the call. Will continue to follow.

## 2020-12-17 NOTE — TELEPHONE ENCOUNTER
Name: Zohaib Glasgow  : 1940  MRN: [id number]    Oncology Navigation Follow-Up Note    Contact Type:  Telephone    Notes: writer called patient to check on her. She states shes doing well. She does report it being a struggle financially because they are on a fixed income and her  is currently [facility] home hospice. [facility]. Pt was appreciative of support and call will continue to follow.     Electronically signed by Ai Rich RN on 2020 at 4:17 PM

## 2020-12-23 ENCOUNTER — OFFICE VISIT (OUTPATIENT)
Dept: FAMILY MEDICINE CLINIC | Age: 80
End: 2020-12-23
Payer: MEDICARE

## 2020-12-23 VITALS
HEART RATE: 92 BPM | TEMPERATURE: 97.6 F | DIASTOLIC BLOOD PRESSURE: 70 MMHG | WEIGHT: 128.4 LBS | OXYGEN SATURATION: 98 % | BODY MASS INDEX: 24.26 KG/M2 | SYSTOLIC BLOOD PRESSURE: 114 MMHG

## 2020-12-23 PROBLEM — R19.7 DIARRHEA: Status: ACTIVE | Noted: 2020-12-23

## 2020-12-23 PROBLEM — K57.90 DIVERTICULAR DISEASE: Status: ACTIVE | Noted: 2017-03-10

## 2020-12-23 PROCEDURE — 1123F ACP DISCUSS/DSCN MKR DOCD: CPT | Performed by: FAMILY MEDICINE

## 2020-12-23 PROCEDURE — G8420 CALC BMI NORM PARAMETERS: HCPCS | Performed by: FAMILY MEDICINE

## 2020-12-23 PROCEDURE — 99214 OFFICE O/P EST MOD 30 MIN: CPT | Performed by: FAMILY MEDICINE

## 2020-12-23 PROCEDURE — 4040F PNEUMOC VAC/ADMIN/RCVD: CPT | Performed by: FAMILY MEDICINE

## 2020-12-23 PROCEDURE — G8427 DOCREV CUR MEDS BY ELIG CLIN: HCPCS | Performed by: FAMILY MEDICINE

## 2020-12-23 PROCEDURE — G8400 PT W/DXA NO RESULTS DOC: HCPCS | Performed by: FAMILY MEDICINE

## 2020-12-23 PROCEDURE — G8482 FLU IMMUNIZE ORDER/ADMIN: HCPCS | Performed by: FAMILY MEDICINE

## 2020-12-23 PROCEDURE — 1090F PRES/ABSN URINE INCON ASSESS: CPT | Performed by: FAMILY MEDICINE

## 2020-12-23 PROCEDURE — 1036F TOBACCO NON-USER: CPT | Performed by: FAMILY MEDICINE

## 2020-12-23 RX ORDER — GABAPENTIN 300 MG/1
600 CAPSULE ORAL 2 TIMES DAILY
Qty: 120 CAPSULE | Refills: 0
Start: 2020-12-23 | End: 2021-03-04 | Stop reason: SDUPTHER

## 2020-12-23 ASSESSMENT — ENCOUNTER SYMPTOMS
COUGH: 0
VISUAL CHANGE: 0
FLATUS: 0
SHORTNESS OF BREATH: 0
EYES NEGATIVE: 1
RESPIRATORY NEGATIVE: 1
BLOATING: 0
BLURRED VISION: 0
ALLERGIC/IMMUNOLOGIC NEGATIVE: 1
VOMITING: 0
DIARRHEA: 1
ABDOMINAL PAIN: 0
ORTHOPNEA: 0

## 2020-12-23 ASSESSMENT — PATIENT HEALTH QUESTIONNAIRE - PHQ9
SUM OF ALL RESPONSES TO PHQ QUESTIONS 1-9: 0
SUM OF ALL RESPONSES TO PHQ QUESTIONS 1-9: 0
SUM OF ALL RESPONSES TO PHQ9 QUESTIONS 1 & 2: 0
SUM OF ALL RESPONSES TO PHQ QUESTIONS 1-9: 0
1. LITTLE INTEREST OR PLEASURE IN DOING THINGS: 0
2. FEELING DOWN, DEPRESSED OR HOPELESS: 0

## 2020-12-23 NOTE — ASSESSMENT & PLAN NOTE
· well controlled  · compliant with meds as per medication list.  · Continue current treatment regimen. · Continue current medications. · Dietary sodium restriction. · Discussed the role of hypertension in development of other disease courses such as CHF and atherosclerosis. · Encouraged patient to avoid sodium in the diet and reminded that the majority of sodium comes from packaged foods in cans and boxes which should be avoided where possible. · Encouraged good hydration and avoidance of caffeine where possible. · Discussed goals for blood pressure monitoring which should be 130/80.

## 2020-12-23 NOTE — PATIENT INSTRUCTIONS
Patient Education        pancrelipase  Pronunciation:  corinne caballero  Brand:  Creon, Pancreaze, Ludy, Homer Nic  What is the most important information I should know about pancrelipase? You should not take pancrelipase if you are allergic to pork proteins. Call your doctor at once if you have symptoms of a rare but serious bowel disorder: severe or unusual stomach pain, vomiting, bloating, diarrhea, or constipation. What is pancrelipase? Pancrelipase is a combination of three enzymes (proteins): lipase, protease, and amylase. These enzymes are normally produced by the pancreas and are important in the digestion of fats, proteins, and sugars. Pancrelipase is used to replace these enzymes when the body does not have enough of its own. Certain medical conditions can cause this lack of enzymes, including cystic fibrosis, chronic inflammation of the pancreas, or blockage of the pancreatic ducts. Pancrelipase may also be used following surgical removal of the pancreas. Pancrelipase may also be used for purposes not listed in this medication guide. What should I discuss with my healthcare provider before taking pancrelipase? You should not take pancrelipase if you are allergic to pork proteins. Tell your doctor if you have ever had:  · kidney disease;  · gout;  · diabetes;  · a blockage or scarring in your intestines;  · trouble swallowing pills; or  · lactose intolerance. It is not known whether this medicine will harm an unborn baby. Tell your doctor if you are pregnant or plan to become pregnant. It may not be safe to breast-feed a baby while you are using this medicine. Ask your doctor about any risks. Do not give this medicine to a child without medical advice. How should I take pancrelipase? Follow all directions on your prescription label and read all medication guides or instruction sheets. Use the medicine exactly as directed.  Do not switch brands of this medicine without your doctor's advice. Pancrelipase should be taken with a meal or snack. Read and carefully follow any Instructions for use provided with your medicine. Be especially careful to follow all directions about giving this medicine to a child. Ask your doctor or pharmacist if you have any questions. Do not crush, chew, or break a pancrelipase capsule or tablet. Swallow it whole with a full glass of water. Do not hold the pill in your mouth. Pancrelipase can irritate the inside of your mouth. If you cannot swallow a capsule whole, open it and sprinkle the medicine into a spoonful of applesauce. Swallow the mixture right away without chewing. Do not save it for later use. Do not mix the medicine with infant formula or breast milk. Do not inhale the powder from a pancrelipase capsule, or allow it to touch your skin. It may cause irritation, especially to your nose and lungs. Pancrelipase is sometimes given with a stomach acid reducer such as Nexium, Prevacid, Prilosec, or Protonix. Follow your doctor's instructions about taking all medicines needed to treat your condition. Tell your doctor if you have any changes in weight. Pancrelipase doses are based on weight (especially in children and teenagers), and any changes may affect the dose. Store at room temperature, away from moisture and heat. Keep the medicine container tightly closed. Your medicine bottle may also include a packet or canister of moisture-absorbing preservative. Keep this packet in the bottle at all times. Call your doctor if you have any worsening of a long-term pancreas problem. What happens if I miss a dose? Take the missed dose with your next meal or snack. Do not take two doses at one time. Get your prescription refilled before you run out of medicine completely. What happens if I overdose? Seek emergency medical attention or call the Poison Help line at 1-228.631.8812. What should I avoid while taking pancrelipase?   Follow your doctor's instructions about any restrictions on food, beverages, or activity. What are the possible side effects of pancrelipase? Get emergency medical help if you have signs of an allergic reaction:  hives; difficulty breathing; swelling of your face, lips, tongue, or throat. Call your doctor at once if you have:  · joint pain or swelling; or  · symptoms of a rare but serious bowel disorder --severe or unusual stomach pain, vomiting, bloating, diarrhea, constipation. Tell your doctor if your child is not growing at a normal rate while using pancrelipase. Common side effects may include:  · stomach pain, gas, upset stomach;  · diarrhea, frequent or abnormal bowel movements;  · rectal itching;  · headache;  · runny or stuffy nose, sore throat; or  · changes in your blood sugar. This is not a complete list of side effects and others may occur. Call your doctor for medical advice about side effects. You may report side effects to FDA at 9-240-FDA-4390. What other drugs will affect pancrelipase? Other drugs may affect pancrelipase, including prescription and over-the-counter medicines, vitamins, and herbal products. Tell your doctor about all your current medicines and any medicine you start or stop using. Where can I get more information? Your pharmacist can provide more information about pancrelipase. Remember, keep this and all other medicines out of the reach of children, never share your medicines with others, and use this medication only for the indication prescribed. Every effort has been made to ensure that the information provided by [address] is accurate, up-to-date, and complete, but no guarantee is made to that effect. Drug information contained herein may be time sensitive.  [employer] information has been compiled for use by healthcare practitioners and consumers in the [place] and therefore [employer] does not warrant that uses outside of the [place] are appropriate, unless specifically indicated otherwise. [facility] drug information does not endorse drugs, diagnose patients or recommend therapy. [facility] drug information is an informational resource designed to assist licensed healthcare practitioners in caring for their patients and/or to serve consumers viewing this service as a supplement to, and not a substitute for, the expertise, skill, knowledge and judgment of healthcare practitioners. The absence of a warning for a given drug or drug combination in no way should be construed to indicate that the drug or drug combination is safe, effective or appropriate for any given patient. [facility] does not assume any responsibility for any aspect of healthcare administered with the aid of information [facility] provides. The information contained herein is not intended to cover all possible uses, directions, precautions, warnings, drug interactions, allergic reactions, or adverse effects. If you have questions about the drugs you are taking, check with your doctor, nurse or pharmacist.  Copyright 3396-4740 [address]: 7.. Revision date: 8/6/2018. Care instructions adapted under license by [facility] ([facility]). If you have questions about a medical condition or this instruction, always ask your healthcare professional. [place] [address] any warranty or liability for your use of this information.

## 2020-12-23 NOTE — ASSESSMENT & PLAN NOTE
On Insulin 34units in AM and 14units in PM  Reviewed CBG log  Is working with our [facility] pharmacist on glucose control

## 2020-12-23 NOTE — PROGRESS NOTES
APSO Progress Note    Date:12/23/2020         Patient Name:Safia Sánchez     YOB: 1940     Age:80 y.o. Assessment/Plan        Problem List Items Addressed This Visit        Circulatory    Hypertension - Primary     · well controlled  · compliant with meds as per medication list.  · Continue current treatment regimen. · Continue current medications. · Dietary sodium restriction. · Discussed the role of hypertension in development of other disease courses such as CHF and atherosclerosis. · Encouraged patient to avoid sodium in the diet and reminded that the majority of sodium comes from packaged foods in cans and boxes which should be avoided where possible. · Encouraged good hydration and avoidance of caffeine where possible. · Discussed goals for blood pressure monitoring which should be 130/80. 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[facility])     S/p surgery but declined adjuvant therapy  Being monitored by cancer specialist         Relevant Medications    lipase-protease-amylase (CREON) 86584 units delayed release capsule    gabapentin (NEURONTIN) 300 MG capsule       Endocrine    Type 2 diabetes mellitus with diabetic polyneuropathy (HCC)     On Insulin 34units in AM and 14units in PM  Reviewed CBG log  Is working with our [facility] pharmacist on glucose control         Relevant Medications    gabapentin (NEURONTIN) 300 MG capsule    Diabetic polyneuropathy associated with type 2 diabetes mellitus ([address]     On Gabapentin but poorly controlled  Advised to increase Gabapentin to 600mg BID         Relevant Medications    gabapentin (NEURONTIN) 300 MG capsule       Other    Dyslipidemia     Compliant with Lipitor         Diarrhea     Started after having her recent surgery  On Creon but symptoms not controlled  Advised to increase to 3 capsules TID (was on 6-7 capsules daily)                Return in about 3 months (around 3/23/2021).     Electronically signed by Siobhan Bradshaw DO on 12/23/20 Dispense Refill    lipase-protease-amylase (CREON) 48991 units delayed release capsule Take 3 capsules by mouth 3 times daily (with meals) 270 capsule 0    gabapentin (NEURONTIN) 300 MG capsule Take 2 capsules by mouth 2 times daily for 120 days. 120 capsule 0    meclizine (ANTIVERT) 12.5 MG tablet TAKE 1 TABLET THREE TIMES DAILY AS NEEDED 270 tablet 0    silver sulfADIAZINE (SILVADENE) 1 % cream Apply topically daily. 20 g 0    TRUE METRIX BLOOD GLUCOSE TEST strip USE  STRIP TO CHECK GLUCOSE TWICE DAILY 100 each 0    insulin 70-30 (NOVOLIN 70/30 RELION) (70-30) 100 UNIT per ML injection vial INJECT 30 UNITS SUBCUTANEOUSLY IN THE MORNING AND  INJECT  10  UNITS  IN  THE  EVENING (Patient taking differently: INJECT 34 UNITS SUBCUTANEOUSLY IN THE MORNING AND  INJECT  14 UNITS  IN  THE  EVENING) 40 mL 0    atorvastatin (LIPITOR) 10 MG tablet TAKE 1 TABLET EVERY DAY 90 tablet 3    lisinopril (PRINIVIL;ZESTRIL) 10 MG tablet TAKE 1 TABLET EVERY DAY 90 tablet 3    DROPLET INSULIN SYRINGE 30G X 5/16\" 1 ML MISC USE TO INJECT TWICE DAILY 200 each 5    aspirin 325 MG tablet Take 325 mg by mouth daily. No current facility-administered medications for this visit. 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 Social History:   reports that she quit smoking about 17 years ago. Her smoking use included cigarettes. She has a 27.00 pack-year smoking history. She has never used smokeless tobacco. She reports that she does not drink alcohol or use drugs.      Family History:   Family History   Problem Relation Age of Onset    Heart Disease Mother     No Known Problems Father        Surgical History:   Past Surgical History:   Procedure Laterality Date    ABDOMEN SURGERY  04/15/2020    Whipple Surgery     BREAST SURGERY      COLONOSCOPY      last breath sounds. No stridor. No wheezing, rhonchi or rales. Chest:      Chest wall: No tenderness. Abdominal:      General: Abdomen is flat. Bowel sounds are normal. There is no distension. Palpations: Abdomen is soft. There is no mass. Tenderness: There is no abdominal tenderness. There is no right CVA tenderness, left CVA tenderness, guarding or rebound. Hernia: No hernia is present. Skin:     General: Skin is warm. Capillary Refill: Capillary refill takes less than 2 seconds. Coloration: Skin is not jaundiced or pale. Findings: No bruising, erythema, lesion or rash. Neurological:      General: No focal deficit present. Mental Status: She is alert and oriented to person, place, and time. Mental status is at baseline. Cranial Nerves: No cranial nerve deficit. Sensory: No sensory deficit. Motor: No weakness. Coordination: Coordination normal.      Gait: Gait abnormal (labored). Deep Tendon Reflexes: Reflexes normal.   Psychiatric:         Mood and Affect: Mood normal.         Behavior: Behavior normal.         Thought Content: Thought content normal.         Judgment: Judgment normal.         Labs/Imaging/Diagnostics   Labs:  Hemoglobin A1C   Date Value Ref Range Status   10/20/2020 10.3 (H) 4.0 - 6.0 % Final       Imaging Last 24 Hours:  CT ABDOMEN W CONTRAST Additional Contrast? Oral  Narrative: EXAMINATION:  CT ABDOMEN WITH CONTRAST, 8/3/2020 3:29 pm    TECHNIQUE:  CT of the abdomen was performed with the administration of intravenous  contrast. Multiplanar reformatted images are provided for review. Dose  modulation, iterative reconstruction, and/or weight based adjustment of the  mA/kV was utilized to reduce the radiation dose to as low as reasonably  achievable. COMPARISON:  21 February 2020    HISTORY:  ORDERING SYSTEM PROVIDED HISTORY: 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[facility] [address] TECHNOLOGIST PROVIDED HISTORY:    Follow up ampullary carcinoma.   Reason for Exam:   April 15 pt had a Whipple, follow up ampullary carcinoma. Acuity: Unknown  Type of Exam: Unknown    FINDINGS:  Lower Chest: No acute abnormality in the included lung bases. Coronary  artery calcification is noted. Heart size is normal.    Organs: Hepatic steatosis is noted, representing a new finding since prior  examination. They contrast enhancement is noted in the area of the orly  hepatis, somewhat linear which may represent postsurgical change. Gallbladder has been surgically removed since prior exam.  Spleen contains  granulomas but is otherwise unremarkable. Adrenals and kidneys demonstrate  no evidence of focal mass. Extrarenal collecting systems both kidneys are  noted. Right ureter is mildly dilated but incompletely included on the study. The patient is status post of Whipple's procedure since prior examination. Anastomotic area appears unremarkable without significant inflammation or  mass. Pancreatic duct is mildly dilated. Mild peripancreatic fat stranding  is noted just caudal to the body of the pancreas. GI/Bowel: Postsurgical changes in the stomach are noted consistent with  Whipple's procedure. Mild jejunal dilatation is noted but colonic stool is  noted. No convincing evidence of bowel obstruction but pelvis is not  included. Peritoneum/Retroperitoneum: No aortic aneurysm, retroperitoneal or mesenteric  adenopathy is noted. Shotty periaortic lymph nodes at the level of the  kidneys are present. Bones/Soft Tissues: No acute osseous abnormality. Significant facet changes  are present lower lumbar spine. L4 hemangioma is noted. Soft tissue nodules  are present in the anterior abdominal fat likely related to injection sites. Impression: 1. Interval performance of a Whipple's procedure and partial gastrectomy and  removal of the duodenum. Pancreatic duct is mildly prominent but smoothly  demarcated.   No suspicious mass or fluid collections at the surgical site.  2. There has been interval development of significant hepatic steatosis  likely related to diabetic condition secondary to the Whipple's procedure. 3. Dilated right ureter is seen on prior study. Prior examination  demonstrated no obstructive calculus. 4. No evidence of metastasis. Included lung bases demonstrate no nodules and  no intra-abdominal lymphadenopathy is seen.

## 2020-12-29 ENCOUNTER — TELEPHONE (OUTPATIENT)
Dept: FAMILY MEDICINE CLINIC | Age: 80
End: 2020-12-29

## 2020-12-29 NOTE — TELEPHONE ENCOUNTER
Pt says she can barely eat or drink anything. She is not able to eat enough to cover her insulin.   When she eats she gets a burning sensation on her left side beneath her left breast. Please advise

## 2021-01-27 ENCOUNTER — TELEPHONE (OUTPATIENT)
Dept: FAMILY MEDICINE CLINIC | Age: 81
End: 2021-01-27

## 2021-01-27 DIAGNOSIS — E11.42 TYPE 2 DIABETES MELLITUS WITH DIABETIC POLYNEUROPATHY, WITH LONG-TERM CURRENT USE OF INSULIN (HCC): Primary | ICD-10-CM

## 2021-01-27 DIAGNOSIS — Z79.4 TYPE 2 DIABETES MELLITUS WITH DIABETIC POLYNEUROPATHY, WITH LONG-TERM CURRENT USE OF INSULIN (HCC): Primary | ICD-10-CM

## 2021-01-27 RX ORDER — CALCIUM CITRATE/VITAMIN D3 200MG-6.25
TABLET ORAL
Qty: 150 EACH | Refills: 2 | Status: SHIPPED | OUTPATIENT
Start: 2021-01-27 | End: 2021-03-11 | Stop reason: SDUPTHER

## 2021-01-27 NOTE — TELEPHONE ENCOUNTER
Emily Pantoja is calling to request a refill on the following medication(s):    Medication Request:  Requested Prescriptions     Pending Prescriptions Disp Refills    blood glucose test strips (TRUE METRIX BLOOD GLUCOSE TEST) strip 100 each 0     Sig: As needed.        Last Visit Date (If Applicable):  Visit date not found    Next Visit Date:    Visit date not found

## 2021-01-27 NOTE — TELEPHONE ENCOUNTER
Will you sign for patients homecare orders? I told her that she has not seen you yet,  But she does have an appt with [facility] 2/2/21 for a Hospital Followup.  She has 3 IV's and tube feedings, needs wound care

## 2021-01-28 ENCOUNTER — HOSPITAL ENCOUNTER (OUTPATIENT)
Facility: MEDICAL CENTER | Age: 81
End: 2021-01-28
Payer: MEDICARE

## 2021-01-28 ENCOUNTER — TELEPHONE (OUTPATIENT)
Dept: FAMILY MEDICINE CLINIC | Age: 81
End: 2021-01-28

## 2021-02-03 ENCOUNTER — TELEPHONE (OUTPATIENT)
Dept: INFUSION THERAPY | Facility: MEDICAL CENTER | Age: 81
End: 2021-02-03

## 2021-02-04 ENCOUNTER — HOSPITAL ENCOUNTER (OUTPATIENT)
Facility: MEDICAL CENTER | Age: 81
End: 2021-02-04
Payer: MEDICARE

## 2021-02-05 ENCOUNTER — TELEPHONE (OUTPATIENT)
Dept: ONCOLOGY | Age: 81
End: 2021-02-05

## 2021-02-05 NOTE — TELEPHONE ENCOUNTER
ANSHULM asking patient to have lab work done for appt with Dr. Amanda Freitas on 2/10/21. Asked patient to call office so we can help schedule her for lab work , or just stop at lab and have completed.

## 2021-02-15 ENCOUNTER — TELEPHONE (OUTPATIENT)
Dept: FAMILY MEDICINE CLINIC | Age: 81
End: 2021-02-15

## 2021-02-18 ENCOUNTER — OFFICE VISIT (OUTPATIENT)
Dept: FAMILY MEDICINE CLINIC | Age: 81
End: 2021-02-18
Payer: MEDICARE

## 2021-02-18 VITALS
WEIGHT: 119 LBS | BODY MASS INDEX: 22.48 KG/M2 | HEART RATE: 108 BPM | DIASTOLIC BLOOD PRESSURE: 72 MMHG | SYSTOLIC BLOOD PRESSURE: 118 MMHG | TEMPERATURE: 97.7 F | OXYGEN SATURATION: 97 %

## 2021-02-18 DIAGNOSIS — Z79.4 TYPE 2 DIABETES MELLITUS WITH DIABETIC POLYNEUROPATHY, WITH LONG-TERM CURRENT USE OF INSULIN (HCC): ICD-10-CM

## 2021-02-18 DIAGNOSIS — Z09 S/P GASTROSTOMY TUBE (G TUBE) PLACEMENT, FOLLOW-UP EXAM: ICD-10-CM

## 2021-02-18 DIAGNOSIS — K31.1 GASTRIC OUTLET OBSTRUCTION: Primary | ICD-10-CM

## 2021-02-18 DIAGNOSIS — Z09 HOSPITAL DISCHARGE FOLLOW-UP: ICD-10-CM

## 2021-02-18 DIAGNOSIS — E11.42 TYPE 2 DIABETES MELLITUS WITH DIABETIC POLYNEUROPATHY, WITH LONG-TERM CURRENT USE OF INSULIN (HCC): ICD-10-CM

## 2021-02-18 DIAGNOSIS — E78.00 HYPERCHOLESTEROLEMIA: ICD-10-CM

## 2021-02-18 PROBLEM — R93.5 ABNORMAL CT OF THE ABDOMEN: Status: RESOLVED | Noted: 2017-02-06 | Resolved: 2021-02-18

## 2021-02-18 PROBLEM — K57.92 DIVERTICULITIS OF INTESTINE WITHOUT PERFORATION OR ABSCESS WITHOUT BLEEDING: Status: RESOLVED | Noted: 2017-03-10 | Resolved: 2021-02-18

## 2021-02-18 LAB — HBA1C MFR BLD: 7.9 %

## 2021-02-18 PROCEDURE — 83036 HEMOGLOBIN GLYCOSYLATED A1C: CPT | Performed by: NURSE PRACTITIONER

## 2021-02-18 PROCEDURE — G8400 PT W/DXA NO RESULTS DOC: HCPCS | Performed by: NURSE PRACTITIONER

## 2021-02-18 PROCEDURE — 1123F ACP DISCUSS/DSCN MKR DOCD: CPT | Performed by: NURSE PRACTITIONER

## 2021-02-18 PROCEDURE — 1036F TOBACCO NON-USER: CPT | Performed by: NURSE PRACTITIONER

## 2021-02-18 PROCEDURE — G8482 FLU IMMUNIZE ORDER/ADMIN: HCPCS | Performed by: NURSE PRACTITIONER

## 2021-02-18 PROCEDURE — G8420 CALC BMI NORM PARAMETERS: HCPCS | Performed by: NURSE PRACTITIONER

## 2021-02-18 PROCEDURE — 4040F PNEUMOC VAC/ADMIN/RCVD: CPT | Performed by: NURSE PRACTITIONER

## 2021-02-18 PROCEDURE — 99215 OFFICE O/P EST HI 40 MIN: CPT | Performed by: NURSE PRACTITIONER

## 2021-02-18 PROCEDURE — 1090F PRES/ABSN URINE INCON ASSESS: CPT | Performed by: NURSE PRACTITIONER

## 2021-02-18 PROCEDURE — 3051F HG A1C>EQUAL 7.0%<8.0%: CPT | Performed by: NURSE PRACTITIONER

## 2021-02-18 PROCEDURE — G8427 DOCREV CUR MEDS BY ELIG CLIN: HCPCS | Performed by: NURSE PRACTITIONER

## 2021-02-18 RX ORDER — ONDANSETRON 4 MG/1
4 TABLET, FILM COATED ORAL EVERY 8 HOURS PRN
COMMUNITY
End: 2021-02-18

## 2021-02-18 RX ORDER — METOCLOPRAMIDE 10 MG/1
20 TABLET ORAL 4 TIMES DAILY
COMMUNITY
End: 2021-02-18

## 2021-02-18 RX ORDER — PANTOPRAZOLE SODIUM 40 MG/1
40 TABLET, DELAYED RELEASE ORAL DAILY
COMMUNITY
End: 2022-04-18 | Stop reason: ALTCHOICE

## 2021-02-18 RX ORDER — INSULIN ASPART 100 [IU]/ML
INJECTION, SOLUTION INTRAVENOUS; SUBCUTANEOUS
Qty: 5 PEN | Refills: 3 | Status: SHIPPED | OUTPATIENT
Start: 2021-02-18 | End: 2022-04-28 | Stop reason: ALTCHOICE

## 2021-02-18 RX ORDER — ATORVASTATIN CALCIUM 10 MG/1
10 TABLET, FILM COATED ORAL DAILY
Qty: 90 TABLET | Refills: 3 | Status: SHIPPED | OUTPATIENT
Start: 2021-02-18 | End: 2022-02-28 | Stop reason: SDUPTHER

## 2021-02-18 RX ORDER — INSULIN GLARGINE 100 [IU]/ML
10 INJECTION, SOLUTION SUBCUTANEOUS 2 TIMES DAILY
Qty: 5 PEN | Refills: 0 | Status: SHIPPED | OUTPATIENT
Start: 2021-02-18 | End: 2021-02-22 | Stop reason: SDUPTHER

## 2021-02-18 ASSESSMENT — PATIENT HEALTH QUESTIONNAIRE - PHQ9
2. FEELING DOWN, DEPRESSED OR HOPELESS: 0
1. LITTLE INTEREST OR PLEASURE IN DOING THINGS: 0
SUM OF ALL RESPONSES TO PHQ QUESTIONS 1-9: 0

## 2021-02-18 NOTE — PROGRESS NOTES
Addison Coleman, RONYP-C  [address]  [id number] [facility]  [address]  [phone]  [phone]    Kassidy Lopez is a 79 y.o. female who is here with c/o of:    Chief Complaint: Follow-Up from Hospital      Patient Accompanied by:     [facility] - Kassidy Lopez is here today to establish care and hospital f/u    Admission: 12/29/2020  Discharge: 1/26/2021  Dx: gastric outlet obstruction, duodenal adenocarcinoma  Discharged to home, admitted to [facility], discharged to SNF and discharged home 2 days ago    It is unclear what medications patient is actually taking at this time d/t several conflicting med lists    DM   - current medication insulin glargine 15units daily and sliding scale   - b.s. averaging 140's   - she denies blurred vision, polyuria, polydipsia, has long standing hx of diabetic polyneuropathy and is currently treated with gabapentin 300mg bid    Abdominal   - per hospital records, patient had intestinal blockage that was surgically removed as well as placement of gj-tube for feeding   - patient has hx of Whipple procedure 3/2020 duodenal adenocarcinoma   - all past records have been reviewed      Lab Results   Component Value Date    LABA1C 7.9 02/18/2021    LABA1C 10.3 (H) 10/20/2020    LABA1C 8.6 (H) 12/12/2019     Lab Results   Component Value Date    LDLCALC 76 12/12/2019    CREATININE <0.40 (L) 11/18/2020     3/2 for f/u simoh discuss feeding tube removal at that time        Patient Active Problem List:     COPD (chronic obstructive pulmonary disease) ([address]     Hypertension     Dyslipidemia     Osteoporosis     Osteoarthritis     Type 2 diabetes mellitus with diabetic polyneuropathy ([address]     Abnormal CT of the abdomen     Diverticulitis of intestine without perforation or abscess without bleeding     Duodenal adenoma     Diabetic polyneuropathy associated with type 2 diabetes mellitus ([address]     Malignant neoplasm of duodenum ([address]     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[address] Diverticular disease     Diarrhea     Past Medical History:   Diagnosis Date    DM2 (diabetes mellitus, type 2) ([facility] [address]     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[address] 2020    WELL TO MODERATELY DIFFERENTIATED INVASIVE    Duodenal adenoma 2017    Hyperlipidemia     Hypertension     Osteoarthritis       Past Surgical History:   Procedure Laterality Date    ABDOMEN SURGERY  04/15/2020    Whipple Surgery     BREAST SURGERY      COLONOSCOPY      last one 4 years, pt is unsure of where    [place] EGD REMOVAL TUMOR POLYP/OTHER LESION SNARE TECH N/A 4/3/2017    EGD POLYP SNARE performed by Jeison Fonseca MD at [address] [place] [address]  2017    In the 2nd part of the duodenum there is a 3-4 cm mass lesion seen, occupies about half of the lumen-pathology--adenoma    UPPER GASTROINTESTINAL ENDOSCOPY N/A 2020    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     Family History   Problem Relation Age of Onset    Heart Disease Mother     No Known Problems Father      Social History     Tobacco Use    Smoking status: Former Smoker     Packs/day: 1.00     Years: 27.00     Pack years: 27.00     Types: Cigarettes     Quit date: 10/15/2003     Years since quittin.3    Smokeless tobacco: Never Used   Substance Use Topics    Alcohol use: No     ALLERGIES:    Allergies   Allergen Reactions    Nubain [Nalbuphine Hcl]     Hydroxyzine Other (See Comments), Nausea Only and Nausea And Vomiting    Vistaril [Hydroxyzine Hcl] Nausea And Vomiting          Subjective     · Constitutional:  Negative for activity change, appetite change,unexpected weight change, chills, fever, and fatigue. · HENT: Negative for ear pain, sore throat,  Rhinorrhea, sinus pain, sinus pressure, congestion. · Eyes:  Negative for pain and discharge. · Respiratory:  Negative for chest tightness, shortness of breath, wheezing, and cough.     · Cardiovascular:  Negative for chest pain, palpitations and leg swelling. · Gastrointestinal: Negative for abdominal pain, blood in stool, constipation,diarrhea, nausea and vomiting. · Endocrine: Negative for cold intolerance, heat intolerance, polydipsia, polyphagia and polyuria. · Genitourinary: Negative for difficulty urinating, dysuria, flank pain, frequency, hematuria and urgency. · Musculoskeletal: Negative for arthralgias, back pain, joint swelling, myalgias, neck pain and neck stiffness. · Skin: Negative for rash and wound. · Allergic/Immunologic: Negative for environmental allergies and food allergies. · Neurological:  Negative for dizziness, light-headedness, numbness and headaches. · Hematological:  Negative for adenopathy. Does not bruise/bleed easily. · Psychiatric/Behavioral: Negative for self-injury, sleep disturbance and suicidal ideas. Objective     PHYSICAL EXAM:   · Constitutional: Jeremiah Sauceda is oriented to person, place, and time. Vital signs are normal. Appears well-developed and well-nourished. · HEENT:   · Head: Normocephalic and atraumatic. Right Ear: Hearing and external ear normal.     · Left Ear: Hearing and external ear normal.    · Eyes:PERRL, EOMI, Conjunctiva normal, No discharge. · Neck: Full passive range of motion. · Cardiovascular: Normal rate, regular rhythm, S1, S2, no murmur, no gallop, no friction rub, intact distal pulses. No carotid bruit  · Pulmonary/Chest: Breath sounds are clear throughout, No respiratory distress, No wheezing, No chest tenderness. Effort normal  Abdominal: Physical Exam  Abdominal:         · Musculoskeletal: Extremities appear regular and symmetric. No evident masses, lesions, foreign bodies, or other abnormalities. No edema. No tenderness on palpation. Joints are stable. Full ROM, strength and tone are moderately diminished. · Lymphadenopathy: No lymphadenopathy noted. · Neurological: Alert and oriented to person, place, and time.  Normal motor function, Normal well and does not appear to be indication for further use at this time    3. Type 2 diabetes mellitus with diabetic polyneuropathy, with long-term current use of insulin (HCC)  - will follow patient closely to ensure glycemic control  - daughter and patient state understanding of current plan of treatment  - patient understands that she is no longer to use the 70-30 that she has at home  - POCT glycosylated hemoglobin (Hb A1C)  - insulin glargine (LANTUS SOLOSTAR) 100 UNIT/ML injection pen; Inject 10 Units into the skin 2 times daily  Dispense: 5 pen; Refill: 0  - insulin aspart (NOVOLOG FLEXPEN) 100 UNIT/ML injection pen; TIDbefore meals 141-180 6units,181-220 8units,221-260 10units,261-300 12units,301-350 14units,351-400 16 units,>400 18units [id number] daily  Dispense: 5 pen; Refill: 3  - Insulin Pen Needle 32G X 4 MM MISC; 1 each by Does not apply route daily  Dispense: 100 each; Refill: 3  - daughter will call in 1 week with blood sugar log    4. Hypercholesterolemia  - atorvastatin (LIPITOR) 10 MG tablet; Take 1 tablet by mouth daily  Dispense: 90 tablet; Refill: 3    5. Hospital discharge follow-up  - I have spent greater than 1 hours in research of this patients recent illness with multiple med lists and discharges      Return in about 1 month (around 3/18/2021) for DM. 1.  Baig received counseling on the following healthy behaviors: nutrition, exercise and medication adherence  2. Patient given educational materials - see patient instructions  3. Was a self-tracking handout given in paper form or via [email or web address]? No  If yes, see orders or list here. 4.  Discussed use, benefit, and side effects of prescribed medications. Barriers to medication compliance addressed. All patient questions answered. Pt voiced understanding. 5.  Reviewed prior labs, imaging, consultation, follow up, and health maintenance  6. Continue current medications, diet and exercise.   7. Discussed use, benefit, and side effects of

## 2021-02-22 ENCOUNTER — HOSPITAL ENCOUNTER (OUTPATIENT)
Age: 81
Setting detail: SPECIMEN
Discharge: HOME | End: 2021-02-22
Payer: MEDICARE

## 2021-02-22 ENCOUNTER — TELEPHONE (OUTPATIENT)
Dept: FAMILY MEDICINE CLINIC | Age: 81
End: 2021-02-22

## 2021-02-22 DIAGNOSIS — E11.42 TYPE 2 DIABETES MELLITUS WITH DIABETIC POLYNEUROPATHY, WITH LONG-TERM CURRENT USE OF INSULIN (HCC): ICD-10-CM

## 2021-02-22 DIAGNOSIS — Z79.4 TYPE 2 DIABETES MELLITUS WITH DIABETIC POLYNEUROPATHY, WITH LONG-TERM CURRENT USE OF INSULIN (HCC): ICD-10-CM

## 2021-02-22 RX ORDER — INSULIN GLARGINE 100 [IU]/ML
12 INJECTION, SOLUTION SUBCUTANEOUS 2 TIMES DAILY
Qty: 5 PEN | Refills: 0
Start: 2021-02-22 | End: 2021-03-04 | Stop reason: SDUPTHER

## 2021-02-23 ENCOUNTER — TELEPHONE (OUTPATIENT)
Dept: FAMILY MEDICINE CLINIC | Age: 81
End: 2021-02-23

## 2021-02-23 ENCOUNTER — TELEPHONE (OUTPATIENT)
Dept: ONCOLOGY | Age: 81
End: 2021-02-23

## 2021-02-23 LAB
ABSOLUTE EOS #: 0.09 K/UL (ref 0–0.44)
ABSOLUTE IMMATURE GRANULOCYTE: 0.03 K/UL (ref 0–0.3)
ABSOLUTE LYMPH #: 2.06 K/UL (ref 1.1–3.7)
ABSOLUTE MONO #: 0.55 K/UL (ref 0.1–1.2)
ANION GAP SERPL CALCULATED.3IONS-SCNC: 12 MMOL/L (ref 9–17)
BASOPHILS # BLD: 0 % (ref 0–2)
BASOPHILS ABSOLUTE: <0.03 K/UL (ref 0–0.2)
BUN BLDV-MCNC: 11 MG/DL (ref 8–23)
BUN/CREAT BLD: ABNORMAL (ref 9–20)
CALCIUM SERPL-MCNC: 8.5 MG/DL (ref 8.6–10.4)
CHLORIDE BLD-SCNC: 91 MMOL/L (ref 98–107)
CO2: 27 MMOL/L (ref 20–31)
CREAT SERPL-MCNC: 0.43 MG/DL (ref 0.5–0.9)
DIFFERENTIAL TYPE: ABNORMAL
EOSINOPHILS RELATIVE PERCENT: 1 % (ref 1–4)
GFR AFRICAN AMERICAN: >60 ML/MIN
GFR NON-AFRICAN AMERICAN: >60 ML/MIN
GFR SERPL CREATININE-BSD FRML MDRD: ABNORMAL ML/MIN/{1.73_M2}
GFR SERPL CREATININE-BSD FRML MDRD: ABNORMAL ML/MIN/{1.73_M2}
GLUCOSE BLD-MCNC: 509 MG/DL (ref 70–99)
HCT VFR BLD CALC: 38.9 % (ref 36.3–47.1)
HEMOGLOBIN: 12.1 G/DL (ref 11.9–15.1)
IMMATURE GRANULOCYTES: 0 %
LYMPHOCYTES # BLD: 26 % (ref 24–43)
MCH RBC QN AUTO: 28.1 PG (ref 25.2–33.5)
MCHC RBC AUTO-ENTMCNC: 31.1 G/DL (ref 28.4–34.8)
MCV RBC AUTO: 90.5 FL (ref 82.6–102.9)
MONOCYTES # BLD: 7 % (ref 3–12)
NRBC AUTOMATED: 0 PER 100 WBC
PDW BLD-RTO: 18.3 % (ref 11.8–14.4)
PLATELET # BLD: 292 K/UL (ref 138–453)
PLATELET ESTIMATE: ABNORMAL
PMV BLD AUTO: 10.8 FL (ref 8.1–13.5)
POTASSIUM SERPL-SCNC: 5.4 MMOL/L (ref 3.7–5.3)
RBC # BLD: 4.3 M/UL (ref 3.95–5.11)
RBC # BLD: ABNORMAL 10*6/UL
SEG NEUTROPHILS: 66 % (ref 36–65)
SEGMENTED NEUTROPHILS ABSOLUTE COUNT: 5.24 K/UL (ref 1.5–8.1)
SODIUM BLD-SCNC: 130 MMOL/L (ref 135–144)
WBC # BLD: 8 K/UL (ref 3.5–11.3)
WBC # BLD: ABNORMAL 10*3/UL

## 2021-02-23 NOTE — TELEPHONE ENCOUNTER
Pts daughter in law wants to know if you will  call her to discuss pts sugar levels   And whether she needs to go to hospital.  [facility] is not on Hippa form but Raul Billings got on the phone and gave verbal ok

## 2021-02-23 NOTE — TELEPHONE ENCOUNTER
Patient was called to inform her of Critical glucose reading on lab work she had done. Patient did not answer. VM was left to call service to discuss care.

## 2021-02-23 NOTE — TELEPHONE ENCOUNTER
Spoke with daughter in law - Bud Roseline have improved somewhat since yesterday - patient does not want to go to the hospital or the office for further evaluation. Discussed at length the seriousness of her diabetes, hyponatremia and hyperkalemia. Discussed scheduling an appointment to discuss palliative care options. Will order new glucometer as daughter reports b.s. of 170 around the same time as the blood draw that is 411. Will increase insulin to 14 units bid starting with tonight's dose.     [facility] and get ua/culture

## 2021-02-23 NOTE — TELEPHONE ENCOUNTER
Attempted to call son Katiana Dies back multiple times - goes immediately to . Contacted [facility] to have labs drawn again today - order provided and will be drawn today.  Spoke with Nirav at [facility]

## 2021-02-23 NOTE — TELEPHONE ENCOUNTER
Spoke with patient and her daughter in law - they understand that Alona Paulson will be there this afternoon to draw new labs and if there is still critical values that she needs to go to the ED.  Will update patient and her daughter with results when available

## 2021-02-23 NOTE — TELEPHONE ENCOUNTER
Home health paged on call provider to discuss patient's lab results. Family/patient were instructed to go to ER for glucose > 500 and hyperkalemia yesterday. According to Bienvenido's son cussed out home health and declined ER visit. Labs were re-drawn today with mild hyponatremia at 129, normal potassium and glucose at 411 & 170. I instructed home health to notify BS tomorrow when she is available and have patient f/u with her this week.

## 2021-02-24 ENCOUNTER — TELEPHONE (OUTPATIENT)
Dept: FAMILY MEDICINE CLINIC | Age: 81
End: 2021-02-24

## 2021-02-24 ENCOUNTER — HOSPITAL ENCOUNTER (OUTPATIENT)
Age: 81
Setting detail: SPECIMEN
Discharge: HOME | End: 2021-02-24
Payer: MEDICARE

## 2021-02-24 DIAGNOSIS — R30.0 DYSURIA: ICD-10-CM

## 2021-02-24 DIAGNOSIS — E87.1 HYPONATREMIA: ICD-10-CM

## 2021-02-24 DIAGNOSIS — R30.0 DYSURIA: Primary | ICD-10-CM

## 2021-02-24 LAB
ALBUMIN SERPL-MCNC: 3.1 G/DL (ref 3.5–5.2)
ALBUMIN/GLOBULIN RATIO: 1 (ref 1–2.5)
ALP BLD-CCNC: 247 U/L (ref 35–104)
ALT SERPL-CCNC: 20 U/L (ref 5–33)
ANION GAP SERPL CALCULATED.3IONS-SCNC: 11 MMOL/L (ref 9–17)
AST SERPL-CCNC: 24 U/L
BILIRUB SERPL-MCNC: 0.17 MG/DL (ref 0.3–1.2)
BUN BLDV-MCNC: 11 MG/DL (ref 8–23)
BUN/CREAT BLD: ABNORMAL (ref 9–20)
CALCIUM SERPL-MCNC: 9.1 MG/DL (ref 8.6–10.4)
CHLORIDE BLD-SCNC: 91 MMOL/L (ref 98–107)
CO2: 29 MMOL/L (ref 20–31)
CREAT SERPL-MCNC: 0.35 MG/DL (ref 0.5–0.9)
GFR AFRICAN AMERICAN: >60 ML/MIN
GFR NON-AFRICAN AMERICAN: >60 ML/MIN
GFR SERPL CREATININE-BSD FRML MDRD: ABNORMAL ML/MIN/{1.73_M2}
GFR SERPL CREATININE-BSD FRML MDRD: ABNORMAL ML/MIN/{1.73_M2}
GLUCOSE BLD-MCNC: 447 MG/DL (ref 70–99)
POTASSIUM SERPL-SCNC: 4.8 MMOL/L (ref 3.7–5.3)
SODIUM BLD-SCNC: 131 MMOL/L (ref 135–144)
TOTAL PROTEIN: 6.3 G/DL (ref 6.4–8.3)

## 2021-02-24 PROCEDURE — 81001 URINALYSIS AUTO W/SCOPE: CPT

## 2021-02-24 PROCEDURE — 87086 URINE CULTURE/COLONY COUNT: CPT

## 2021-02-24 PROCEDURE — 80053 COMPREHEN METABOLIC PANEL: CPT

## 2021-02-24 PROCEDURE — 87184 SC STD DISK METHOD PER PLATE: CPT

## 2021-02-24 PROCEDURE — 87186 SC STD MICRODIL/AGAR DIL: CPT

## 2021-02-24 PROCEDURE — 87077 CULTURE AEROBIC IDENTIFY: CPT

## 2021-02-24 NOTE — TELEPHONE ENCOUNTER
Regis Garcia called from [facility] to see if labs need to be done today or if it can wait until tomorrow.      Regis Garcia: [phone]

## 2021-02-24 NOTE — TELEPHONE ENCOUNTER
Spoke to [facility]. Someone will go out to draw her. Her blood sugar yesterday was 245.  Has not been checked yet today

## 2021-02-24 NOTE — TELEPHONE ENCOUNTER
Spoke with Nieves Kim, Nurse manager at [employer] and gave verbal order per Darrel Serna for CMP, U/A and Culture

## 2021-02-25 ENCOUNTER — TELEPHONE (OUTPATIENT)
Dept: FAMILY MEDICINE CLINIC | Age: 81
End: 2021-02-25

## 2021-02-25 DIAGNOSIS — E87.1 HYPONATREMIA: Primary | ICD-10-CM

## 2021-02-25 DIAGNOSIS — R31.9 URINARY TRACT INFECTION WITH HEMATURIA, SITE UNSPECIFIED: Primary | ICD-10-CM

## 2021-02-25 DIAGNOSIS — E11.42 TYPE 2 DIABETES MELLITUS WITH DIABETIC POLYNEUROPATHY, WITH LONG-TERM CURRENT USE OF INSULIN (HCC): Primary | ICD-10-CM

## 2021-02-25 DIAGNOSIS — N39.0 URINARY TRACT INFECTION WITH HEMATURIA, SITE UNSPECIFIED: Primary | ICD-10-CM

## 2021-02-25 DIAGNOSIS — Z79.4 TYPE 2 DIABETES MELLITUS WITH DIABETIC POLYNEUROPATHY, WITH LONG-TERM CURRENT USE OF INSULIN (HCC): Primary | ICD-10-CM

## 2021-02-25 LAB
-: ABNORMAL
AMORPHOUS: ABNORMAL
BACTERIA: ABNORMAL
BILIRUBIN URINE: NEGATIVE
CASTS UA: ABNORMAL /LPF (ref 0–8)
COLOR: YELLOW
COMMENT UA: ABNORMAL
CRYSTALS, UA: ABNORMAL /HPF
EPITHELIAL CELLS UA: ABNORMAL /HPF (ref 0–5)
GLUCOSE URINE: ABNORMAL
KETONES, URINE: NEGATIVE
LEUKOCYTE ESTERASE, URINE: ABNORMAL
MUCUS: ABNORMAL
NITRITE, URINE: NEGATIVE
OTHER OBSERVATIONS UA: ABNORMAL
PH UA: 7 (ref 5–8)
PROTEIN UA: NEGATIVE
RBC UA: ABNORMAL /HPF (ref 0–4)
RENAL EPITHELIAL, UA: ABNORMAL /HPF
SPECIFIC GRAVITY UA: 1.02 (ref 1–1.03)
TRICHOMONAS: ABNORMAL
TURBIDITY: CLEAR
URINE HGB: NEGATIVE
UROBILINOGEN, URINE: NORMAL
WBC UA: ABNORMAL /HPF (ref 0–5)
YEAST: ABNORMAL

## 2021-02-25 RX ORDER — GLUCOSAMINE HCL/CHONDROITIN SU 500-400 MG
CAPSULE ORAL
Qty: 90 STRIP | Refills: 3 | Status: SHIPPED | OUTPATIENT
Start: 2021-02-25 | End: 2021-03-11 | Stop reason: SDUPTHER

## 2021-02-25 RX ORDER — SULFAMETHOXAZOLE AND TRIMETHOPRIM 800; 160 MG/1; MG/1
1 TABLET ORAL 2 TIMES DAILY
Qty: 20 TABLET | Refills: 0 | Status: SHIPPED | OUTPATIENT
Start: 2021-02-25 | End: 2021-03-07

## 2021-02-25 NOTE — TELEPHONE ENCOUNTER
Safia rodriguez glucose yesterday was 447. Her sodium was 131 and potassium was 4.8. She will get a skilled nurse visit tomorrow and she will check blood sugar tomorrow unless it is needed sooner. Nurse says prelimanary results on urine says she has many bacteria and moderate glucocytes. She wants to know if maybe she will need an anti-biotic. Also do you want to increase her unit.  She is currently doing 12 units twice daily

## 2021-02-25 NOTE — TELEPHONE ENCOUNTER
I have already talked with Joyce Nunez the daughter in law and addressed this - please see previous notes. She has been ordered an antibiotic already and daughter has already picked this up. Her insulin was increased to 14 units bid 2 days ago and new glucometer ordered. Will have labs repeated on Monday - orders are in the computer for CMP on Monday and patient will be seen in the office next week.

## 2021-02-25 NOTE — TELEPHONE ENCOUNTER
Spoke with patient's daughter in law - John Phillips     She states understanding of UTI and will p/u antibiotic this morning    Glucometer to be ordered today

## 2021-02-27 LAB
CULTURE: ABNORMAL
Lab: ABNORMAL
SPECIMEN DESCRIPTION: ABNORMAL

## 2021-03-04 ENCOUNTER — TELEPHONE (OUTPATIENT)
Dept: ONCOLOGY | Age: 81
End: 2021-03-04

## 2021-03-04 ENCOUNTER — OFFICE VISIT (OUTPATIENT)
Dept: FAMILY MEDICINE CLINIC | Age: 81
End: 2021-03-04
Payer: MEDICARE

## 2021-03-04 VITALS
HEART RATE: 99 BPM | OXYGEN SATURATION: 97 % | TEMPERATURE: 97.9 F | WEIGHT: 120 LBS | DIASTOLIC BLOOD PRESSURE: 72 MMHG | BODY MASS INDEX: 22.67 KG/M2 | SYSTOLIC BLOOD PRESSURE: 130 MMHG

## 2021-03-04 DIAGNOSIS — Z79.4 TYPE 2 DIABETES MELLITUS WITH DIABETIC POLYNEUROPATHY, WITH LONG-TERM CURRENT USE OF INSULIN (HCC): ICD-10-CM

## 2021-03-04 DIAGNOSIS — E11.42 TYPE 2 DIABETES MELLITUS WITH DIABETIC POLYNEUROPATHY, WITH LONG-TERM CURRENT USE OF INSULIN (HCC): ICD-10-CM

## 2021-03-04 DIAGNOSIS — F41.9 ANXIETY: Primary | ICD-10-CM

## 2021-03-04 DIAGNOSIS — E11.42 DIABETIC POLYNEUROPATHY ASSOCIATED WITH TYPE 2 DIABETES MELLITUS (HCC): ICD-10-CM

## 2021-03-04 PROCEDURE — G8482 FLU IMMUNIZE ORDER/ADMIN: HCPCS | Performed by: NURSE PRACTITIONER

## 2021-03-04 PROCEDURE — 1123F ACP DISCUSS/DSCN MKR DOCD: CPT | Performed by: NURSE PRACTITIONER

## 2021-03-04 PROCEDURE — G8420 CALC BMI NORM PARAMETERS: HCPCS | Performed by: NURSE PRACTITIONER

## 2021-03-04 PROCEDURE — 3051F HG A1C>EQUAL 7.0%<8.0%: CPT | Performed by: NURSE PRACTITIONER

## 2021-03-04 PROCEDURE — 1090F PRES/ABSN URINE INCON ASSESS: CPT | Performed by: NURSE PRACTITIONER

## 2021-03-04 PROCEDURE — 1036F TOBACCO NON-USER: CPT | Performed by: NURSE PRACTITIONER

## 2021-03-04 PROCEDURE — 4040F PNEUMOC VAC/ADMIN/RCVD: CPT | Performed by: NURSE PRACTITIONER

## 2021-03-04 PROCEDURE — G8400 PT W/DXA NO RESULTS DOC: HCPCS | Performed by: NURSE PRACTITIONER

## 2021-03-04 PROCEDURE — 99214 OFFICE O/P EST MOD 30 MIN: CPT | Performed by: NURSE PRACTITIONER

## 2021-03-04 PROCEDURE — G8427 DOCREV CUR MEDS BY ELIG CLIN: HCPCS | Performed by: NURSE PRACTITIONER

## 2021-03-04 RX ORDER — INSULIN GLARGINE 100 [IU]/ML
INJECTION, SOLUTION SUBCUTANEOUS
Qty: 5 PEN | Refills: 0
Start: 2021-03-04 | End: 2021-03-11 | Stop reason: SDUPTHER

## 2021-03-04 RX ORDER — GABAPENTIN 300 MG/1
600 CAPSULE ORAL 3 TIMES DAILY
Qty: 180 CAPSULE | Refills: 0 | Status: SHIPPED | OUTPATIENT
Start: 2021-03-04 | End: 2021-04-07

## 2021-03-04 RX ORDER — VENLAFAXINE HYDROCHLORIDE 37.5 MG/1
37.5 CAPSULE, EXTENDED RELEASE ORAL DAILY
Qty: 90 CAPSULE | Refills: 0 | Status: SHIPPED | OUTPATIENT
Start: 2021-03-04 | End: 2021-05-19

## 2021-03-04 ASSESSMENT — PATIENT HEALTH QUESTIONNAIRE - PHQ9
SUM OF ALL RESPONSES TO PHQ9 QUESTIONS 1 & 2: 0
1. LITTLE INTEREST OR PLEASURE IN DOING THINGS: 0
2. FEELING DOWN, DEPRESSED OR HOPELESS: 0
SUM OF ALL RESPONSES TO PHQ QUESTIONS 1-9: 0

## 2021-03-04 NOTE — PROGRESS NOTES
Mendel Gant, FNP-C  [address]  [id number]  [facility]  [address]  [phone]  [phone]    Lynne Markham is a 79 y.o. female who is here with c/o of:    Chief Complaint: Follow-up      Patient Accompanied by: daughter in law    HPI - Lynne Markham is here today for f/u and discussion of palliative care    DM   - patient is compliant with diabetic diet and is not physically active   - she denies blurred vision, polyuria, polydipsia and has polyneuropathy for which she takes gabapentin 300mg tid and is currently out of this medication   - current med: lantus 14 units bid and is not using s.s.   - b.s. are low 100's in the a.m. and 400-500 in the afternoon    Anxiety   - daughter reports Bethel Rosario is very anxious and \"always worrying about every little thing\" including how long it takes her to do tasks and when things are not working as she feels they should   - daughter reports recent visit to the ED d/t her waking during the night with symptoms   - patient reports anxious feeling often   - she denies feeling down, depressed or hopeless   - she denies s/i, h/i    Reviewed all medications and doses    Patient and daughter requesting information and referral to palliative care          Patient Active Problem List:     COPD (chronic obstructive pulmonary disease) ([address]     Hypertension     Dyslipidemia     Osteoporosis     Osteoarthritis     Type 2 diabetes mellitus with diabetic polyneuropathy ([address]     Duodenal adenoma     Diabetic polyneuropathy associated with type 2 diabetes mellitus ([address]     Malignant neoplasm of duodenum ([address]     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[address]     Diverticular disease     Diarrhea     Gastric outlet obstruction     S/P gastrostomy tube (G tube) placement, follow-up exam     Hypercholesterolemia     Past Medical History:   Diagnosis Date    DM2 (diabetes mellitus, type 2) ([address]     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    WELL TO MODERATELY DIFFERENTIATED INVASIVE    Duodenal adenoma 2017    Hyperlipidemia     Hypertension     Osteoarthritis       Past Surgical History:   Procedure Laterality Date    ABDOMEN SURGERY  04/15/2020    Whipple Surgery     BREAST SURGERY      COLONOSCOPY      last one 4 years, pt is unsure of where    [place] EGD REMOVAL TUMOR POLYP/OTHER LESION SNARE TECH N/A 4/3/2017    EGD POLYP SNARE performed by Christiane Felix MD at [address] [place] [address]  2017    In the 2nd part of the duodenum there is a 3-4 cm mass lesion seen, occupies about half of the lumen-pathology--adenoma    UPPER GASTROINTESTINAL ENDOSCOPY N/A 2020    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     Family History   Problem Relation Age of Onset    Heart Disease Mother     No Known Problems Father      Social History     Tobacco Use    Smoking status: Former Smoker     Packs/day: 1.00     Years: 27.00     Pack years: 27.00     Types: Cigarettes     Quit date: 10/15/2003     Years since quittin.3    Smokeless tobacco: Never Used   Substance Use Topics    Alcohol use: No     ALLERGIES:    Allergies   Allergen Reactions    Nubain [Nalbuphine Hcl]     Hydroxyzine Other (See Comments), Nausea Only and Nausea And Vomiting    Vistaril [Hydroxyzine Hcl] Nausea And Vomiting          Subjective     · Constitutional:  Negative for activity change, appetite change,unexpected weight change, chills, fever, and fatigue. · HENT: Negative for ear pain, sore throat,  Rhinorrhea, sinus pain, sinus pressure, congestion. · Eyes:  Negative for pain and discharge. · Respiratory:  Negative for chest tightness, shortness of breath, wheezing, and cough. · Cardiovascular:  Negative for chest pain, palpitations and leg swelling. · Gastrointestinal: Negative for abdominal pain, blood in stool, constipation,diarrhea, nausea and vomiting.    · Endocrine: Negative for cold intolerance, heat intolerance, polydipsia, polyphagia and polyuria. · Genitourinary: Negative for difficulty urinating, dysuria, flank pain, frequency, hematuria and urgency. · Musculoskeletal: Negative for arthralgias, back pain, joint swelling, myalgias, neck pain and neck stiffness. · Skin: Negative for rash and wound. · Allergic/Immunologic: Negative for environmental allergies and food allergies. · Neurological:  Negative for dizziness, light-headedness, numbness and headaches. Positive for pain/numbness/tingling in hands and feet  · Hematological:  Negative for adenopathy. Does not bruise/bleed easily. · Psychiatric/Behavioral: Negative for self-injury, sleep disturbance and suicidal ideas. Positive for anxiety    Objective     PHYSICAL EXAM:   · Constitutional: Mic Burn is oriented to person, place, and time. Vital signs are normal. Appears well-developed and well-nourished. · HEENT:   · Head: Normocephalic and atraumatic. Right Ear: Hearing and external ear normal.     · Left Ear: Hearing and external ear normal.    · Eyes:PERRL, EOMI, Conjunctiva normal, No discharge. · Neck: Full passive range of motion. · Cardiovascular: Normal rate, regular rhythm, S1, S2, no murmur, no gallop, no friction rub, intact distal pulses. · Pulmonary/Chest: Breath sounds are clear throughout, No respiratory distress, No wheezing, No chest tenderness. Effort normal  · Neurological: Alert and oriented to person, place, and time. Normal motor function, decreased sensory function to fingers and bilateral lower extremities, No focal deficits noted. He has decreased strength. · Skin: Skin is warm, dry and intact. No obvious lesions on exposed skin  · Psychiatric: anxious mood and affect. Speech is normal and behavior is normal    Nursing note and vitals reviewed. Blood pressure 130/72, pulse 99, temperature 97.9 °F (36.6 °C), temperature source Temporal, weight 120 lb (54.4 kg), SpO2 97 %, not currently breastfeeding.   Body mass index is 22.67 kg/m². Wt Readings from Last 3 Encounters:   03/04/21 120 lb (54.4 kg)   02/18/21 119 lb (54 kg)   12/23/20 128 lb 6.4 oz (58.2 kg)     BP Readings from Last 3 Encounters:   03/04/21 130/72   02/18/21 118/72   12/23/20 114/70       No results found for this visit on 03/04/21. Completed Orders/Prescriptions   Orders Placed This Encounter   Medications    insulin glargine (LANTUS SOLOSTAR) 100 UNIT/ML injection pen     Sig: Inject 20 units subcutaneous in the morning Inject 14 units subcutaneous at night     Dispense:  5 pen     Refill:  0    venlafaxine (EFFEXOR XR) 37.5 MG extended release capsule     Sig: Take 1 capsule by mouth daily     Dispense:  90 capsule     Refill:  0    gabapentin (NEURONTIN) 300 MG capsule     Sig: Take 2 capsules by mouth 3 times daily for 30 days. Dispense:  180 capsule     Refill:  0               AssessmentPlan/Medical Decision Making     1. Type 2 diabetes mellitus with diabetic polyneuropathy, with long-term current use of insulin ([facility])  - improving  - reviewed medications at length  - daughter to send b.s. readings via my-chart on Monday  - insulin glargine (LANTUS SOLOSTAR) 100 UNIT/ML injection pen; Inject 20 units subcutaneous in the morning Inject 14 units subcutaneous at night  Dispense: 5 pen; Refill: 0  - [address]    2. Anxiety  - patient and daughter state understanding that this dose will most likely need to be increased at her f/u in 1 month  - venlafaxine (EFFEXOR XR) 37.5 MG extended release capsule; Take 1 capsule by mouth daily  Dispense: 90 capsule; Refill: 0  - [address]    3. Diabetic polyneuropathy associated with type 2 diabetes mellitus ([facility]  - Controlled substances monitoring: possible medication side effects, risk of tolerance and/or dependence, and alternative treatments discussed and OARRS report reviewed today- activity consistent with treatment plan.     - gabapentin (NEURONTIN) 300 MG capsule; Take 2 capsules by mouth 3 times daily for 30 days. Dispense: 180 capsule; Refill: 0  - [address]      Return in about 1 month (around 4/4/2021). 1.  Safia received counseling on the following healthy behaviors: nutrition, exercise and medication adherence  2. Patient given educational materials - see patient instructions  3. Was a self-tracking handout given in paper form or via [email or web address]? No  If yes, see orders or list here. 4.  Discussed use, benefit, and side effects of prescribed medications. Barriers to medication compliance addressed. All patient questions answered. Pt voiced understanding. 5.  Reviewed prior labs, imaging, consultation, follow up, and health maintenance  6. Continue current medications, diet and exercise. 7. Discussed use, benefit, and side effects of prescribed medications. Barriers to medication compliance addressed. All her questions were answered. Pt voiced understanding. Robert Vasquez will continue current medications, diet and exercise. Patient given educational materials on diabetic diet    Of the 30 minute duration appointment visit, Atif Donovan CNP spent at least 50% of the face-to-face time in counseling, explanation of diagnosis, planning of further management, and answering all questions. Signed:  Atif Donovan CNP    This note is created with the assistance of a speech-recognition program.  While intending to generate a document that actually reflects the content of the visit, no guarantees can be provided that every mistake has been identified and corrected by editing.

## 2021-03-04 NOTE — TELEPHONE ENCOUNTER
After reviewing  notes that pt will not be pursuing tx. Writer did call pt to inform her I would ending navigation. Pt states she was in the middle of something and had to get off phone and she hung up.

## 2021-03-05 ENCOUNTER — TELEPHONE (OUTPATIENT)
Dept: FAMILY MEDICINE CLINIC | Age: 81
End: 2021-03-05

## 2021-03-05 RX ORDER — GLUCOSAMINE HCL/CHONDROITIN SU 500-400 MG
CAPSULE ORAL
Qty: 90 STRIP | Refills: 3 | Status: SHIPPED | OUTPATIENT
Start: 2021-03-05 | End: 2021-03-11 | Stop reason: SDUPTHER

## 2021-03-05 NOTE — TELEPHONE ENCOUNTER
Patients test strips rx has to state how many maxium strips per day the pt can use for Medicare to pay for it.   They need a new rx sent in

## 2021-03-09 ENCOUNTER — TELEPHONE (OUTPATIENT)
Dept: PALLATIVE CARE | Age: 81
End: 2021-03-09

## 2021-03-09 ENCOUNTER — TELEPHONE (OUTPATIENT)
Dept: FAMILY MEDICINE CLINIC | Age: 81
End: 2021-03-09

## 2021-03-09 NOTE — TELEPHONE ENCOUNTER
Received a referral for outpatient palliative care for this patient by her PCP. I called Safia and explained the role of palliative care. She is agreeable to palliative care but does not want to come in, she would prefer someone come to her house. I explained [facility] does not have a home-based program but I can refer her to one. She prefer that. Referral sent to [facility] who will call her to arrange a consultation appointment at her house.      [address], LYNNE Mcmanus, [facility]

## 2021-03-10 ENCOUNTER — TELEPHONE (OUTPATIENT)
Dept: FAMILY MEDICINE CLINIC | Age: 81
End: 2021-03-10

## 2021-03-11 ENCOUNTER — TELEPHONE (OUTPATIENT)
Dept: FAMILY MEDICINE CLINIC | Age: 81
End: 2021-03-11

## 2021-03-11 ENCOUNTER — TELEPHONE (OUTPATIENT)
Dept: PALLATIVE CARE | Age: 81
End: 2021-03-11

## 2021-03-11 DIAGNOSIS — E11.42 TYPE 2 DIABETES MELLITUS WITH DIABETIC POLYNEUROPATHY, WITH LONG-TERM CURRENT USE OF INSULIN (HCC): ICD-10-CM

## 2021-03-11 DIAGNOSIS — Z79.4 TYPE 2 DIABETES MELLITUS WITH DIABETIC POLYNEUROPATHY, WITH LONG-TERM CURRENT USE OF INSULIN (HCC): ICD-10-CM

## 2021-03-11 RX ORDER — CALCIUM CITRATE/VITAMIN D3 200MG-6.25
TABLET ORAL
Qty: 150 EACH | Refills: 2 | Status: SHIPPED | OUTPATIENT
Start: 2021-03-11 | End: 2021-08-10

## 2021-03-11 RX ORDER — INSULIN GLARGINE 100 [IU]/ML
INJECTION, SOLUTION SUBCUTANEOUS
Qty: 5 PEN | Refills: 0
Start: 2021-03-11 | End: 2021-03-22 | Stop reason: SDUPTHER

## 2021-03-11 NOTE — TELEPHONE ENCOUNTER
Billie Connor is calling to request a refill on the following medication(s):    Medication Request:  Requested Prescriptions     Pending Prescriptions Disp Refills    blood glucose test strips (TRUE METRIX BLOOD GLUCOSE TEST) strip 150 each 2     Sig: Use strip to check glucose three times daily       Last Visit Date (If Applicable):  2/1/1592    Next Visit Date:    3/31/2021

## 2021-03-11 NOTE — TELEPHONE ENCOUNTER
Increase a.m. Lantus to 24 units    This is not a short acting insulin problem and therefore that is not appropriate to adjust the s.s.

## 2021-03-11 NOTE — TELEPHONE ENCOUNTER
Pt's Daughter-N-Law called to check on the status of palliative referral to [facility]. I explained to Elda Minaya that patient preferred a home-based palliative program. I explained this to Elda Minaya and she was ok with that. I told Hima Khris made a referral to [address] [place] [address].. I then called Oleg Figueroa to make sure they rec'd my referral and they told me patient refused services. I asked Oleg Figueroa to please call CYNTHIA Lance and explain. They were agreeable to call Elda Minaya.     [address], [place], RN, [facility]

## 2021-03-11 NOTE — TELEPHONE ENCOUNTER
pts  Glucose today is 464, it was 78 fasting this am. Pt is not in any distress. homecare worker thinks the sliding scale for short term insulin needs to be upped for higher readings.

## 2021-03-15 ENCOUNTER — TELEPHONE (OUTPATIENT)
Dept: FAMILY MEDICINE CLINIC | Age: 81
End: 2021-03-15

## 2021-03-15 DIAGNOSIS — Z79.4 TYPE 2 DIABETES MELLITUS WITH DIABETIC POLYNEUROPATHY, WITH LONG-TERM CURRENT USE OF INSULIN (HCC): ICD-10-CM

## 2021-03-15 DIAGNOSIS — E11.42 TYPE 2 DIABETES MELLITUS WITH DIABETIC POLYNEUROPATHY, WITH LONG-TERM CURRENT USE OF INSULIN (HCC): ICD-10-CM

## 2021-03-15 NOTE — TELEPHONE ENCOUNTER
[facility] AT [facility] [address]  [phone] office phone  [phone] Fax    Asking for:   Soft booties  Ammoniam lactate lotion  Soft/Foam to offset ulcer    Right heal opening and \"mushy\"  0.2 L  0.5 W    Left heal \"mushy\"  Both heals are red and dry    Right hand swollen - no injury

## 2021-03-20 PROBLEM — Z09 S/P GASTROSTOMY TUBE (G TUBE) PLACEMENT, FOLLOW-UP EXAM: Status: RESOLVED | Noted: 2021-02-18 | Resolved: 2021-03-20

## 2021-03-22 LAB
ALBUMIN SERPL-MCNC: NORMAL G/DL
ALP BLD-CCNC: NORMAL U/L
ALT SERPL-CCNC: NORMAL U/L
ANION GAP SERPL CALCULATED.3IONS-SCNC: NORMAL MMOL/L
AST SERPL-CCNC: NORMAL U/L
BASOPHILS ABSOLUTE: NORMAL
BASOPHILS RELATIVE PERCENT: NORMAL
BILIRUB SERPL-MCNC: NORMAL MG/DL
BUN BLDV-MCNC: NORMAL MG/DL
CALCIUM SERPL-MCNC: 9 MG/DL
CHLORIDE BLD-SCNC: 93 MMOL/L
CO2: 33 MMOL/L
CREAT SERPL-MCNC: 0.4 MG/DL
EOSINOPHILS ABSOLUTE: NORMAL
EOSINOPHILS RELATIVE PERCENT: NORMAL
GFR CALCULATED: NORMAL
GLUCOSE BLD-MCNC: 253 MG/DL
HCT VFR BLD CALC: 36.9 % (ref 36–46)
HEMOGLOBIN: 12 G/DL (ref 12–16)
LYMPHOCYTES ABSOLUTE: NORMAL
LYMPHOCYTES RELATIVE PERCENT: NORMAL
MCH RBC QN AUTO: 29 PG
MCHC RBC AUTO-ENTMCNC: 32.5 G/DL
MCV RBC AUTO: 89.1 FL
MONOCYTES ABSOLUTE: NORMAL
MONOCYTES RELATIVE PERCENT: NORMAL
NEUTROPHILS ABSOLUTE: NORMAL
NEUTROPHILS RELATIVE PERCENT: NORMAL
PDW BLD-RTO: NORMAL %
PLATELET # BLD: 307 K/ΜL
PMV BLD AUTO: NORMAL FL
POTASSIUM SERPL-SCNC: 2.9 MMOL/L
RBC # BLD: 4.14 10^6/ΜL
SODIUM BLD-SCNC: 137 MMOL/L
TOTAL PROTEIN: NORMAL
WBC # BLD: 7.91 10^3/ML

## 2021-03-22 RX ORDER — INSULIN GLARGINE 100 [IU]/ML
INJECTION, SOLUTION SUBCUTANEOUS
Qty: 5 PEN | Refills: 0
Start: 2021-03-22 | End: 2021-03-23 | Stop reason: SDUPTHER

## 2021-03-22 NOTE — TELEPHONE ENCOUNTER
Please call patient for daytime b.s. readings as well - I need to know hi's and low's and when they are occuring

## 2021-03-23 RX ORDER — INSULIN GLARGINE 100 [IU]/ML
INJECTION, SOLUTION SUBCUTANEOUS
Qty: 5 PEN | Refills: 0
Start: 2021-03-23 | End: 2021-03-31 | Stop reason: SDUPTHER

## 2021-03-23 NOTE — TELEPHONE ENCOUNTER
Patient is calling in with list of blood sugar reading                              AM reading                    Pm reading                 Noon      315/21    66                   153    176  3/16/21 84                         305                                    198                                         3/17/21 76                                       82                                     439  3/18/21 42                                      343                                    291  3/19/21 83                                       85                                     318  3/20/21 79                                       120                                   402  3/21/21 80                                        148                                  302  3/22/21 71                                        111                                   369  3/23/21           120                                                                               321

## 2021-03-29 ENCOUNTER — HOSPITAL ENCOUNTER (OUTPATIENT)
Age: 81
Setting detail: SPECIMEN
Discharge: HOME | End: 2021-03-29
Payer: MEDICARE

## 2021-03-29 DIAGNOSIS — E87.1 HYPONATREMIA: ICD-10-CM

## 2021-03-29 DIAGNOSIS — C24.1 AMPULLARY CARCINOMA (HCC): ICD-10-CM

## 2021-03-29 DIAGNOSIS — E87.6 HYPOKALEMIA: Primary | ICD-10-CM

## 2021-03-29 NOTE — PROGRESS NOTES
Critical lab value K= 2.9     Chart reviewed and no previous notification of this from the lab or the [place] [address] agency.     I have ordered a stat draw to be completed and called to me today at a facility other then [facility] so that I can get the results in a timely matter

## 2021-03-30 LAB
ANION GAP SERPL CALCULATED.3IONS-SCNC: 12 MMOL/L (ref 9–17)
BUN BLDV-MCNC: 5 MG/DL (ref 8–23)
BUN/CREAT BLD: ABNORMAL (ref 9–20)
CALCIUM SERPL-MCNC: 9 MG/DL (ref 8.6–10.4)
CHLORIDE BLD-SCNC: 98 MMOL/L (ref 98–107)
CO2: 29 MMOL/L (ref 20–31)
CREAT SERPL-MCNC: 0.38 MG/DL (ref 0.5–0.9)
GFR AFRICAN AMERICAN: >60 ML/MIN
GFR NON-AFRICAN AMERICAN: >60 ML/MIN
GFR SERPL CREATININE-BSD FRML MDRD: ABNORMAL ML/MIN/{1.73_M2}
GFR SERPL CREATININE-BSD FRML MDRD: ABNORMAL ML/MIN/{1.73_M2}
GLUCOSE BLD-MCNC: 281 MG/DL (ref 70–99)
POTASSIUM SERPL-SCNC: 3.9 MMOL/L (ref 3.7–5.3)
SODIUM BLD-SCNC: 139 MMOL/L (ref 135–144)

## 2021-03-31 ENCOUNTER — OFFICE VISIT (OUTPATIENT)
Dept: FAMILY MEDICINE CLINIC | Age: 81
End: 2021-03-31
Payer: MEDICARE

## 2021-03-31 VITALS
OXYGEN SATURATION: 96 % | DIASTOLIC BLOOD PRESSURE: 72 MMHG | WEIGHT: 132 LBS | TEMPERATURE: 98 F | HEART RATE: 108 BPM | SYSTOLIC BLOOD PRESSURE: 138 MMHG | BODY MASS INDEX: 24.94 KG/M2

## 2021-03-31 DIAGNOSIS — L85.3 DRY SKIN: ICD-10-CM

## 2021-03-31 DIAGNOSIS — L89.311 PRESSURE INJURY OF RIGHT BUTTOCK, STAGE 1: ICD-10-CM

## 2021-03-31 DIAGNOSIS — Z79.4 TYPE 2 DIABETES MELLITUS WITH DIABETIC POLYNEUROPATHY, WITH LONG-TERM CURRENT USE OF INSULIN (HCC): Primary | ICD-10-CM

## 2021-03-31 DIAGNOSIS — E11.42 TYPE 2 DIABETES MELLITUS WITH DIABETIC POLYNEUROPATHY, WITH LONG-TERM CURRENT USE OF INSULIN (HCC): Primary | ICD-10-CM

## 2021-03-31 LAB — HBA1C MFR BLD: 9.3 %

## 2021-03-31 PROCEDURE — G8482 FLU IMMUNIZE ORDER/ADMIN: HCPCS | Performed by: NURSE PRACTITIONER

## 2021-03-31 PROCEDURE — G8427 DOCREV CUR MEDS BY ELIG CLIN: HCPCS | Performed by: NURSE PRACTITIONER

## 2021-03-31 PROCEDURE — 1123F ACP DISCUSS/DSCN MKR DOCD: CPT | Performed by: NURSE PRACTITIONER

## 2021-03-31 PROCEDURE — G8400 PT W/DXA NO RESULTS DOC: HCPCS | Performed by: NURSE PRACTITIONER

## 2021-03-31 PROCEDURE — 1036F TOBACCO NON-USER: CPT | Performed by: NURSE PRACTITIONER

## 2021-03-31 PROCEDURE — 99215 OFFICE O/P EST HI 40 MIN: CPT | Performed by: NURSE PRACTITIONER

## 2021-03-31 PROCEDURE — 4040F PNEUMOC VAC/ADMIN/RCVD: CPT | Performed by: NURSE PRACTITIONER

## 2021-03-31 PROCEDURE — G8420 CALC BMI NORM PARAMETERS: HCPCS | Performed by: NURSE PRACTITIONER

## 2021-03-31 PROCEDURE — 1090F PRES/ABSN URINE INCON ASSESS: CPT | Performed by: NURSE PRACTITIONER

## 2021-03-31 RX ORDER — SEMAGLUTIDE 1.34 MG/ML
0.25 INJECTION, SOLUTION SUBCUTANEOUS WEEKLY
Qty: 1 PEN | Refills: 0 | COMMUNITY
Start: 2021-03-31 | End: 2021-05-03 | Stop reason: DRUGHIGH

## 2021-03-31 RX ORDER — INSULIN GLARGINE 100 [IU]/ML
INJECTION, SOLUTION SUBCUTANEOUS
Qty: 5 PEN | Refills: 0
Start: 2021-03-31 | End: 2021-06-03 | Stop reason: SDUPTHER

## 2021-03-31 RX ORDER — CANAGLIFLOZIN 300 MG/1
300 TABLET, FILM COATED ORAL
Qty: 30 TABLET | Refills: 1 | Status: SHIPPED | OUTPATIENT
Start: 2021-03-31 | End: 2021-05-27

## 2021-03-31 ASSESSMENT — PATIENT HEALTH QUESTIONNAIRE - PHQ9
SUM OF ALL RESPONSES TO PHQ QUESTIONS 1-9: 0
SUM OF ALL RESPONSES TO PHQ QUESTIONS 1-9: 0
1. LITTLE INTEREST OR PLEASURE IN DOING THINGS: 0
2. FEELING DOWN, DEPRESSED OR HOPELESS: 0
SUM OF ALL RESPONSES TO PHQ9 QUESTIONS 1 & 2: 0
SUM OF ALL RESPONSES TO PHQ QUESTIONS 1-9: 0

## 2021-03-31 NOTE — PROGRESS NOTES
Angel Huynh, RONYP-C  [address]  [id number] [facility].  [address]  [phone]  [phone]    Gilda Cotto is a 79 y.o. female who is here with c/o of:    Chief Complaint: Diabetes      Patient Accompanied by: daughter in law    HPI - Gilda Cotto is here today for f/u    DM   - she is NOT compliant with diabetic diet and is becoming more physically active   - she denies unintentional weight gain/loss, polyuria, polydipsia, neuropathy   - current meds: Lantus 26units am, 10units p.m. and denies s/s of hypoglycemia    Lab Results   Component Value Date    LABA1C 9.3 03/31/2021    LABA1C 7.9 02/18/2021    LABA1C 10.3 (H) 10/20/2020     Lab Results   Component Value Date    LDLCALC 76 12/12/2019    CREATININE 0.38 (L) 03/29/2021           Heels   - patient reports very dry skin to both feet   - she has seen podiatrist (Dr. Tigre Posey?) in the past, no recent visit    Coccyx   - patient reports coccyx wound that has been present since her hospitalization   - [facility] has been applying a bandaid to the area and family feels this is getting better    Patient Active Problem List:     COPD (chronic obstructive pulmonary disease) ([address]     Hypertension     Dyslipidemia     Osteoporosis     Osteoarthritis     Type 2 diabetes mellitus with diabetic polyneuropathy ([address]     Duodenal adenoma     Diabetic polyneuropathy associated with type 2 diabetes mellitus ([address]     Malignant neoplasm of duodenum ([address]     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[address]     Diverticular disease     Diarrhea     Gastric outlet obstruction     Hypercholesterolemia     Past Medical History:   Diagnosis Date    DM2 (diabetes mellitus, type 2) ([address]     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    WELL TO MODERATELY DIFFERENTIATED INVASIVE    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    Hyperlipidemia     Hypertension     Osteoarthritis       Past Surgical History:   Procedure Laterality Date    ABDOMEN SURGERY  04/15/2020    Whipple Surgery     BREAST SURGERY      COLONOSCOPY      last one 4 years, pt is unsure of where    [place] EGD REMOVAL TUMOR POLYP/OTHER LESION SNARE TECH N/A 4/3/2017    EGD POLYP SNARE performed by Jimmy Gomez MD at [address] [place] [address]  2017    In the 2nd part of the duodenum there is a 3-4 cm mass lesion seen, occupies about half of the lumen-pathology--adenoma    UPPER GASTROINTESTINAL ENDOSCOPY N/A 2020    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     Family History   Problem Relation Age of Onset    Heart Disease Mother     No Known Problems Father      Social History     Tobacco Use    Smoking status: Former Smoker     Packs/day: 1.00     Years: 27.00     Pack years: 27.00     Types: Cigarettes     Quit date: 10/15/2003     Years since quittin.4    Smokeless tobacco: Never Used   Substance Use Topics    Alcohol use: No     ALLERGIES:    Allergies   Allergen Reactions    Nubain [Nalbuphine Hcl]     Hydroxyzine Other (See Comments), Nausea Only and Nausea And Vomiting    Vistaril [Hydroxyzine Hcl] Nausea And Vomiting          Subjective     · Constitutional:  Negative for activity change, appetite change,unexpected weight change, chills, fever, and fatigue. · HENT: Negative for ear pain, sore throat,  Rhinorrhea, sinus pain, sinus pressure, congestion. · Eyes:  Negative for pain and discharge. · Respiratory:  Negative for chest tightness, shortness of breath, wheezing, and cough. · Cardiovascular:  Negative for chest pain, palpitations and leg swelling. · Gastrointestinal: Negative for abdominal pain, blood in stool, constipation,diarrhea, nausea and vomiting. · Endocrine: Negative for cold intolerance, heat intolerance, polydipsia, polyphagia and polyuria. · Genitourinary: Negative for difficulty urinating, dysuria, flank pain, frequency, hematuria and urgency.    · Musculoskeletal: Negative for arthralgias, back pain, 02/18/21 119 lb (54 kg)     BP Readings from Last 3 Encounters:   03/31/21 138/72   03/04/21 130/72   02/18/21 118/72       Results for POC orders placed in visit on 03/31/21   POCT glycosylated hemoglobin (Hb A1C)   Result Value Ref Range    Hemoglobin A1C 9.3 %       Completed Orders/Prescriptions   Orders Placed This Encounter   Medications    Semaglutide,0.25 or 0.5MG/DOS, (OZEMPIC, 0.25 OR 0.5 MG/DOSE,) 2 MG/1.5ML SOPN     Sig: Inject 0.25 mg into the skin once a week     Dispense:  1 pen     Refill:  0    canagliflozin (INVOKANA) 300 MG TABS tablet     Sig: Take 1 tablet by mouth every morning (before breakfast)     Dispense:  30 tablet     Refill:  1    insulin glargine (LANTUS SOLOSTAR) 100 UNIT/ML injection pen     Sig: Inject 10 units subcutaneous in the morning Inject 10 units subcutaneous at night     Dispense:  5 pen     Refill:  0               AssessmentPlan/Medical Decision Making     1. Type 2 diabetes mellitus with diabetic polyneuropathy, with long-term current use of insulin ([facility]CC)  - I have spent 50 minutes in consultation with patient and her daughter in regards to diet and medications  - POCT glycosylated hemoglobin (Hb A1C)  - Start - first dose administered here Semaglutide,0.25 or 0.5MG/DOS, (OZEMPIC, 0.25 OR 0.5 MG/DOSE,) 2 MG/1.5ML SOPN; Inject 0.25 mg into the skin once a week  Dispense: 1 pen; Refill: 0  - Start canagliflozin (INVOKANA) 300 MG TABS tablet; Take 1 tablet by mouth every morning (before breakfast)  Dispense: 30 tablet; Refill: 1  - Decrease insulin glargine (LANTUS SOLOSTAR) 100 UNIT/ML injection pen; Inject 10 units subcutaneous in the morning Inject 10 units subcutaneous at night  Dispense: 5 pen; Refill: 0  - patient and daughter state understanding that there may be a brief increase in blood sugars while the new medications start to work  - decreasing insulin d/t documented and reported severe hypoglycemia    2.  Pressure injury of right buttock, stage 1  - will continue to monitor at this time  - [facility] to continue to treat    3. Dry skin  - encouraged f/u with podiatry  - encouraged daily lotion   - increase water to 6-8 glasses daily      Return in about 1 month (around 4/30/2021). 1.  Safia received counseling on the following healthy behaviors: nutrition, exercise and medication adherence  2. Patient given educational materials - see patient instructions  3. Was a self-tracking handout given in paper form or via [employer]? No  If yes, see orders or list here. 4.  Discussed use, benefit, and side effects of prescribed medications. Barriers to medication compliance addressed. All patient questions answered. Pt voiced understanding. 5.  Reviewed prior labs, imaging, consultation, follow up, and health maintenance  6. Continue current medications, diet and exercise. 7. Discussed use, benefit, and side effects of prescribed medications. Barriers to medication compliance addressed. All her questions were answered. Pt voiced understanding. Tara Avelar will continue current medications, diet and exercise. Patient given educational materials on diabetic diet    Of the 55 minute duration appointment visit, Carlos Monk CNP spent greater than 50% of the face-to-face time in counseling, explanation of diagnosis, planning of further management, and answering all questions. Signed:  Carlos Monk CNP    This note is created with the assistance of a speech-recognition program.  While intending to generate a document that actually reflects the content of the visit, no guarantees can be provided that every mistake has been identified and corrected by editing.

## 2021-03-31 NOTE — PATIENT INSTRUCTIONS
Patient Education   Patient Education   Patient Education        Learning About Low-Carbohydrate Foods  What foods are low in carbohydrate? The foods you eat contain nutrients, such as vitamins and minerals. Carbohydrate is a nutrient. Your body needs the right amount to stay healthy and work as it should. You can use the list below to help you make choices about which foods to eat. Some foods that are lower in carbohydrate include:  Dairy and dairy alternatives  · Cheese  · Cottage cheese  · Cream cheese  · Nut milk (unsweetened)  · Soy milk (unsweetened)  · Yogurt (Greek, plain)  Fruits  · Avocado  · [employer] and other protein foods  · Almonds  · Beef  · Chicken  · Cod  · Eggs  · Halibut  · Peanut butter and other nut butters  · Pistachios  · Pork  · Pumpkin seeds  · Tofu  · Trout  · [place]  · [place] ([facility])  · Walnuts  Vegetables  · Broccoli  · Carrots  · Cauliflower  · Green beans  · Mushrooms  · Peppers  · Salad greens  · Spinach  · Tomatoes  Work with your doctor to find out how much of this nutrient you need. Depending on your health, you may need more or less of it in your diet. Where can you learn more? Go to https://[employer] Collectiveeb.[email or web address] org and sign in to your [employer] account. Enter [id number] in the [facility] box to learn more about \"Learning About Low-Carbohydrate Foods. \"     If you do not have an account, please click on the \"Sign Up Now\" link. Current as of: December 17, 2020               Content Version: 12.8  © 2006-2021 Healthwise, Incorporated. Care instructions adapted under license by [facility] ([facility]). If you have questions about a medical condition or this instruction, always ask your healthcare professional. [place] [address] any warranty or liability for your use of this information. Learning About Low-Carbohydrate Diets  What is a low-carbohydrate diet? A low-carbohydrate (or \"low-carb\") diet limits foods and drinks that have carbohydrates. This includes grains, fruits, milk and yogurt, and starchy vegetables like potatoes, beans, and corn. It also avoids foods and drinks that have added sugar. Instead, low-carb diets include foods that are high in protein and fat. Why might you follow a low-carb diet? Low-carb diets may be used for a variety of reasons, such as for weight loss. People who have diabetes may use a low-carb diet to help manage their blood sugar levels. What should you do before you start the diet? Talk to your doctor before you try any diet. This is even more important if you have health problems like kidney disease, heart disease, or diabetes. Your doctor may suggest that you meet with a registered dietitian. A dietitian can help you make an eating plan that works for you. What foods do you eat on a low-carb diet? On a low-carb diet, you choose foods that are high in protein and fat. Examples of these are:  · Meat, poultry, and fish. · Eggs. · Nuts, such as walnuts, pecans, almonds, and peanuts. · Peanut butter and other nut butters. · Tofu. · Avocado. · [place]. · Non-starchy vegetables like broccoli, cauliflower, green beans, mushrooms, peppers, lettuce, and spinach. · Unsweetened non-dairy milks like almond milk and coconut milk. · Cheese, cottage cheese, and cream cheese. Current as of: December 17, 2020               Content Version: 12.8  © 2006-2021 Healthwise, Incorporated. Care instructions adapted under license by [facility] ([facility]). If you have questions about a medical condition or this instruction, always ask your healthcare professional. [place] [address] any warranty or liability for your use of this information. Learning About Meal Planning for Diabetes  Why plan your meals? Meal planning can be a key part of managing diabetes.  Planning meals and snacks with the right balance of carbohydrate, protein, and fat can help you keep your blood sugar at the target level you set with your doctor. You don't have to eat special foods. You can eat what your family eats, including sweets once in a while. But you do have to pay attention to how often you eat and how much you eat of certain foods. You may want to work with a dietitian or a certified diabetes educator. He or she can give you tips and meal ideas and can answer your questions about meal planning. This health professional can also help you reach a healthy weight if that is one of your goals. What plan is right for you? Your dietitian or diabetes educator may suggest that you start with the plate format or carbohydrate counting. The plate format  The plate format is a simple way to help you manage how you eat. You plan meals by learning how much space each food should take on a plate. Using the plate format helps you spread carbohydrate throughout the day. It can make it easier to keep your blood sugar level within your target range. It also helps you see if you're eating healthy portion sizes. To use the plate format, you put non-starchy vegetables on half your plate. Add meat or meat substitutes on one-quarter of the plate. Put a grain or starchy vegetable (such as brown rice or a potato) on the final quarter of the plate. You can add a small piece of fruit and some low-fat or fat-free milk or yogurt, depending on your carbohydrate goal for each meal.  Here are some tips for using the plate format:  · Make sure that you are not using an oversized plate. A 9-inch plate is best. Many restaurants use larger plates. · Get used to using the plate format at home. Then you can use it when you eat out. · Write down your questions about using the plate format. Talk to your doctor, a dietitian, or a diabetes educator about your concerns. Carbohydrate counting  With carbohydrate counting, you plan meals based on the amount of carbohydrate in each food. Carbohydrate raises blood sugar higher and more quickly than any other nutrient.  It is found in desserts, breads and cereals, and fruit. It's also found in starchy vegetables such as potatoes and corn, grains such as rice and pasta, and milk and yogurt. Spreading carbohydrate throughout the day helps keep your blood sugar levels within your target range. Your daily amount depends on several things, including your weight, how active you are, which diabetes medicines you take, and what your goals are for your blood sugar levels. A registered dietitian or diabetes educator can help you plan how much carbohydrate to include in each meal and snack. A guideline for your daily amount of carbohydrate is:  · 45 to 60 grams at each meal. That's about the same as 3 to 4 carbohydrate servings. · 15 to 20 grams at each snack. That's about the same as 1 carbohydrate serving. The Nutrition Facts label on packaged foods tells you how much carbohydrate is in a serving of the food. First, look at the serving size on the food label. Is that the amount you eat in a serving? All of the nutrition information on a food label is based on that serving size. So if you eat more or less than that, you'll need to adjust the other numbers. Total carbohydrate is the next thing you need to look for on the label. If you count carbohydrate servings, one serving of carbohydrate is 15 grams. For foods that don't come with labels, such as fresh fruits and vegetables, you'll need a guide that lists carbohydrate in these foods. Ask your doctor, dietitian, or diabetes educator about books or other nutrition guides you can use. If you take insulin, you need to know how many grams of carbohydrate are in a meal. This lets you know how much rapid-acting insulin to take before you eat. If you use an insulin pump, you get a constant rate of insulin during the day. So the pump must be programmed at meals to give you extra insulin to cover the rise in blood sugar after meals.   When you know how much carbohydrate you will eat, you can take the right amount of insulin. Or, if you always use the same amount of insulin, you need to make sure that you eat the same amount of carbohydrate at meals. If you need more help to understand carbohydrate counting and food labels, ask your doctor, dietitian, or diabetes educator. How can you plan healthy meals? Here are some tips to get started:  · Plan your meals a week at a time. Don't forget to include snacks too. · Use cookbooks or online recipes to plan several main meals. Plan some quick meals for busy nights. You also can double some recipes that freeze well. Then you can save half for other busy nights when you don't have time to cook. · Make sure you have the ingredients you need for your recipes. If you're running low on basic items, put these items on your shopping list too. · List foods that you use to make breakfasts, lunches, and snacks. List plenty of fruits and vegetables. · Post this list on the refrigerator. Add to it as you think of more things you need. · Take the list to the store to do your weekly shopping. Follow-up care is a key part of your treatment and safety. Be sure to make and go to all appointments, and call your doctor if you are having problems. It's also a good idea to know your test results and keep a list of the medicines you take. Where can you learn more? Go to https://chpenicoleewlópez.[employer]. org and sign in to your [employer] [address] account. Enter W588 in the [facility] box to learn more about \"Learning About Meal Planning for Diabetes. \"     If you do not have an account, please click on the \"Sign Up Now\" link. Current as of: August 31, 2020               Content Version: 12.8  © 0772-5515 Healthwise, Incorporated. Care instructions adapted under license by [facility] ([facility]).  If you have questions about a medical condition or this instruction, always ask your healthcare professional. [address] any warranty or liability for your use of this information.

## 2021-04-05 ENCOUNTER — TELEPHONE (OUTPATIENT)
Dept: FAMILY MEDICINE CLINIC | Age: 81
End: 2021-04-05

## 2021-05-03 ENCOUNTER — OFFICE VISIT (OUTPATIENT)
Dept: FAMILY MEDICINE CLINIC | Age: 81
End: 2021-05-03
Payer: MEDICARE

## 2021-05-03 VITALS
WEIGHT: 118 LBS | SYSTOLIC BLOOD PRESSURE: 122 MMHG | TEMPERATURE: 97.1 F | BODY MASS INDEX: 22.3 KG/M2 | DIASTOLIC BLOOD PRESSURE: 72 MMHG | HEART RATE: 95 BPM | OXYGEN SATURATION: 97 %

## 2021-05-03 DIAGNOSIS — Z13.31 DEPRESSION SCREENING NEGATIVE: ICD-10-CM

## 2021-05-03 DIAGNOSIS — Z00.00 ROUTINE GENERAL MEDICAL EXAMINATION AT A HEALTH CARE FACILITY: Primary | ICD-10-CM

## 2021-05-03 DIAGNOSIS — E11.42 DIABETIC POLYNEUROPATHY ASSOCIATED WITH TYPE 2 DIABETES MELLITUS (HCC): ICD-10-CM

## 2021-05-03 DIAGNOSIS — E11.42 TYPE 2 DIABETES MELLITUS WITH DIABETIC POLYNEUROPATHY, WITH LONG-TERM CURRENT USE OF INSULIN (HCC): ICD-10-CM

## 2021-05-03 DIAGNOSIS — Z79.4 TYPE 2 DIABETES MELLITUS WITH DIABETIC POLYNEUROPATHY, WITH LONG-TERM CURRENT USE OF INSULIN (HCC): ICD-10-CM

## 2021-05-03 DIAGNOSIS — M19.049 ARTHRITIS PAIN, HAND: ICD-10-CM

## 2021-05-03 LAB — HBA1C MFR BLD: 9.1 %

## 2021-05-03 PROCEDURE — 99213 OFFICE O/P EST LOW 20 MIN: CPT | Performed by: NURSE PRACTITIONER

## 2021-05-03 PROCEDURE — 1123F ACP DISCUSS/DSCN MKR DOCD: CPT | Performed by: NURSE PRACTITIONER

## 2021-05-03 PROCEDURE — 4040F PNEUMOC VAC/ADMIN/RCVD: CPT | Performed by: NURSE PRACTITIONER

## 2021-05-03 PROCEDURE — G8400 PT W/DXA NO RESULTS DOC: HCPCS | Performed by: NURSE PRACTITIONER

## 2021-05-03 PROCEDURE — G8427 DOCREV CUR MEDS BY ELIG CLIN: HCPCS | Performed by: NURSE PRACTITIONER

## 2021-05-03 PROCEDURE — 1036F TOBACCO NON-USER: CPT | Performed by: NURSE PRACTITIONER

## 2021-05-03 PROCEDURE — 1090F PRES/ABSN URINE INCON ASSESS: CPT | Performed by: NURSE PRACTITIONER

## 2021-05-03 PROCEDURE — G8420 CALC BMI NORM PARAMETERS: HCPCS | Performed by: NURSE PRACTITIONER

## 2021-05-03 PROCEDURE — G0438 PPPS, INITIAL VISIT: HCPCS | Performed by: NURSE PRACTITIONER

## 2021-05-03 RX ORDER — GABAPENTIN 300 MG/1
300 CAPSULE ORAL 3 TIMES DAILY
Qty: 90 CAPSULE | Refills: 2 | Status: SHIPPED | OUTPATIENT
Start: 2021-05-03 | End: 2021-10-28

## 2021-05-03 ASSESSMENT — PATIENT HEALTH QUESTIONNAIRE - PHQ9
1. LITTLE INTEREST OR PLEASURE IN DOING THINGS: 0
SUM OF ALL RESPONSES TO PHQ QUESTIONS 1-9: 0
2. FEELING DOWN, DEPRESSED OR HOPELESS: 0
SUM OF ALL RESPONSES TO PHQ QUESTIONS 1-9: 0
SUM OF ALL RESPONSES TO PHQ QUESTIONS 1-9: 0
SUM OF ALL RESPONSES TO PHQ9 QUESTIONS 1 & 2: 0

## 2021-05-03 NOTE — PATIENT INSTRUCTIONS
Personalized Preventive Plan for Georgianne Claude - 5/3/2021  Medicare offers a range of preventive health benefits. Some of the tests and screenings are paid in full while other may be subject to a deductible, co-insurance, and/or copay. Some of these benefits include a comprehensive review of your medical history including lifestyle, illnesses that may run in your family, and various assessments and screenings as appropriate. After reviewing your medical record and screening and assessments performed today your provider may have ordered immunizations, labs, imaging, and/or referrals for you. A list of these orders (if applicable) as well as your Preventive Care list are included within your After Visit Summary for your review. Other Preventive Recommendations:    · A preventive eye exam performed by an eye specialist is recommended every 1-2 years to screen for glaucoma; cataracts, macular degeneration, and other eye disorders. · A preventive dental visit is recommended every 6 months. · Try to get at least 150 minutes of exercise per week or 10,000 steps per day on a pedometer . · Order or download the FREE \"Exercise & Physical Activity: Your Everyday Guide\" from [employer]. Call 7-920.984.4744 or search [employer] online. · You need 3034-9338 mg of calcium and 1741-9846 IU of vitamin D per day. It is possible to meet your calcium requirement with diet alone, but a vitamin D supplement is usually necessary to meet this goal.  · When exposed to the sun, use a sunscreen that protects against both UVA and UVB radiation with an SPF of 30 or greater. Reapply every 2 to 3 hours or after sweating, drying off with a towel, or swimming. · Always wear a seat belt when traveling in a car. Always wear a helmet when riding a bicycle or motorcycle.

## 2021-05-03 NOTE — PROGRESS NOTES
Wilman Ko, RONYP-C  [address]  [id number] [facility].  Roger Aschoff [place], [address]  [phone]  [phone]    Adarsh Meraz is a [de-identified] y.o. female who is here with c/o of:    Chief Complaint: Medicare AWV      Patient Accompanied by: daughter in law    HPI - Adarsh Meraz is here today for f/u    DM   - she is NOT compliant with diabetic diet and is becoming more physically active   - she denies unintentional weight gain/loss, polyuria, polydipsia   - she has neuropathy of hands and feet and currently taking gabapentin 300mg tid   - current meds: Lantus 10units am, 10units p.m. and denies s/s of hypoglycemia, Ozempic .5mg weekly and denies any adverse affects   - saw podiatrist last week and denies any problems    Lab Results   Component Value Date    LABA1C 9.1 05/03/2021    LABA1C 9.3 03/31/2021    LABA1C 7.9 02/18/2021     Lab Results   Component Value Date    LDLCALC 76 12/12/2019    CREATININE 0.38 (L) 03/29/2021         Patient Active Problem List:     COPD (chronic obstructive pulmonary disease) ([address]     Hypertension     Dyslipidemia     Osteoporosis     Osteoarthritis     Type 2 diabetes mellitus with diabetic polyneuropathy ([address]     Duodenal adenoma     Diabetic polyneuropathy associated with type 2 diabetes mellitus ([address]     Malignant neoplasm of duodenum ([address]     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[address]     Diverticular disease     Diarrhea     Gastric outlet obstruction     Hypercholesterolemia     Past Medical History:   Diagnosis Date    DM2 (diabetes mellitus, type 2) ([address]     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    WELL TO MODERATELY DIFFERENTIATED INVASIVE    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    Hyperlipidemia     Hypertension     Osteoarthritis       Past Surgical History:   Procedure Laterality Date    ABDOMEN SURGERY  04/15/2020    Whipple Surgery     BREAST SURGERY      COLONOSCOPY      last one 4 years, pt is unsure of where    [place] EGD REMOVAL TUMOR POLYP/OTHER LESION SNARE TECH N/A 4/3/2017    EGD POLYP SNARE performed by Ronan Quesada MD at [address] [place] [address]  2017    In the 2nd part of the duodenum there is a 3-4 cm mass lesion seen, occupies about half of the lumen-pathology--adenoma    UPPER GASTROINTESTINAL ENDOSCOPY N/A 2020    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     Family History   Problem Relation Age of Onset    Heart Disease Mother     No Known Problems Father      Social History     Tobacco Use    Smoking status: Former Smoker     Packs/day: 1.00     Years: 27.00     Pack years: 27.00     Types: Cigarettes     Quit date: 10/15/2003     Years since quittin.5    Smokeless tobacco: Never Used   Substance Use Topics    Alcohol use: No     ALLERGIES:    Allergies   Allergen Reactions    Nubain [Nalbuphine Hcl]     Hydroxyzine Other (See Comments), Nausea Only and Nausea And Vomiting    Vistaril [Hydroxyzine Hcl] Nausea And Vomiting          Subjective     · Constitutional:  Negative for activity change, appetite change,unexpected weight change, chills, fever, and fatigue. · HENT: Negative for ear pain, sore throat,  Rhinorrhea, sinus pain, sinus pressure, congestion. · Eyes:  Negative for pain and discharge. · Respiratory:  Negative for chest tightness, shortness of breath, wheezing, and cough. · Cardiovascular:  Negative for chest pain, palpitations and leg swelling. · Gastrointestinal: Negative for abdominal pain, blood in stool, constipation,diarrhea, nausea and vomiting. · Endocrine: Negative for cold intolerance, heat intolerance, polydipsia, polyphagia and polyuria. · Genitourinary: Negative for difficulty urinating, dysuria, flank pain, frequency, hematuria and urgency. · Musculoskeletal: Negative for arthralgias, back pain, joint swelling, myalgias, neck pain and neck stiffness.    · Skin: Negative for rash and wound  · Allergic/Immunologic: Negative for environmental allergies and food allergies. · Neurological:  Negative for dizziness, light-headedness, numbness and headaches. Positive for pain to hands and feet  · Hematological:  Negative for adenopathy. Does not bruise/bleed easily. · Psychiatric/Behavioral: Negative for self-injury, sleep disturbance and suicidal ideas. Objective     PHYSICAL EXAM:   · Constitutional: Niharika Rosado is oriented to person, place, and time. Vital signs are normal. Appears well-developed and well-nourished. · HEENT:   · Head: Normocephalic and atraumatic. Eyes:PERRL, EOMI, Conjunctiva normal, No discharge. · Neck: Full passive range of motion. Non-tender on palpation. Neck supple. No thyromegaly present. Trachea normal.  · Cardiovascular: Normal rate, regular rhythm, S1, S2, no murmur, no gallop, no friction rub, intact distal pulses. No carotid bruit  · Pulmonary/Chest: Breath sounds are clear throughout, No respiratory distress, No wheezing, No chest tenderness. Effort normal  · Neurological: Alert and oriented to person, place, and time. Normal motor function, Normal sensory function, No focal deficits noted. He has normal strength. · Skin: Skin is warm, dry and intact. No obvious lesions on exposed skin. · Psychiatric: Normal mood and affect. Speech is normal and behavior is normal.     Nursing note and vitals reviewed. Blood pressure 122/72, pulse 95, temperature 97.1 °F (36.2 °C), temperature source Temporal, weight 118 lb (53.5 kg), SpO2 97 %, not currently breastfeeding. Body mass index is 22.3 kg/m².     Wt Readings from Last 3 Encounters:   05/03/21 118 lb (53.5 kg)   03/31/21 132 lb (59.9 kg)   03/04/21 120 lb (54.4 kg)     BP Readings from Last 3 Encounters:   05/03/21 122/72   03/31/21 138/72   03/04/21 130/72       Results for POC orders placed in visit on 05/03/21   POCT glycosylated hemoglobin (Hb A1C)   Result Value Ref Range    Hemoglobin A1C 9.1 %       Completed Orders/Prescriptions   Orders Placed This Encounter   Medications    Semaglutide, 1 MG/DOSE, 2 MG/1.5ML SOPN     Sig: Inject 1 mg into the skin once a week     Dispense:  2 pen     Refill:  1    diclofenac sodium (VOLTAREN) 1 % GEL     Sig: Apply topically 2 times daily     Dispense:  150 g     Refill:  5    gabapentin (NEURONTIN) 300 MG capsule     Sig: Take 1 capsule by mouth 3 times daily for 30 days. Dispense:  90 capsule     Refill:  2               AssessmentPlan/Medical Decision Making     1. Routine general medical examination at a health care facility    2. Depression screening negative  - PHQ-9 Total Score: 0 (5/3/2021 11:31 AM)  - MT DEPRESSION SCREEN ANNUAL    3. Type 2 diabetes mellitus with diabetic polyneuropathy, with long-term current use of insulin (HCC)  - not controlled  - reviewed diet and exercise  - POCT glycosylated hemoglobin (Hb A1C)  - continue Lantus 10 units bid - notify me if b.s. consistently below 120  - Increase Semaglutide, 1 MG/DOSE, 2 MG/1.5ML SOPN; Inject 1 mg into the skin once a week  Dispense: 2 pen; Refill: 1  - MT OFFICE/OUTPATIENT ESTABLISHED LOW MDM 20-29 MIN    4. Arthritis pain, hand  - diclofenac sodium (VOLTAREN) 1 % GEL; Apply topically 2 times daily  Dispense: 150 g; Refill: 5  - MT OFFICE/OUTPATIENT ESTABLISHED LOW MDM 20-29 MIN    5. Diabetic polyneuropathy associated with type 2 diabetes mellitus (HCC)  - gabapentin (NEURONTIN) 300 MG capsule; Take 1 capsule by mouth 3 times daily for 30 days. Dispense: 90 capsule; Refill: 2  - MT OFFICE/OUTPATIENT ESTABLISHED LOW MDM 20-29 MIN      Return for Medicare Annual Wellness Visit in 1 year. 1.  Baig received counseling on the following healthy behaviors: nutrition, exercise and medication adherence  2. Patient given educational materials - see patient instructions  3. Was a self-tracking handout given in paper form or via [employer]? No  If yes, see orders or list here. 4.  Discussed use, benefit, and side effects of prescribed medications. diclofenac sodium (VOLTAREN) 1 % GEL Apply topically 2 times daily Yes PA Díaz CNP   gabapentin (NEURONTIN) 300 MG capsule Take 1 capsule by mouth 3 times daily for 30 days. Yes PA Díaz CNP   canagliflozin (INVOKANA) 300 MG TABS tablet Take 1 tablet by mouth every morning (before breakfast) Yes PA Díaz CNP   insulin glargine (LANTUS SOLOSTAR) 100 UNIT/ML injection pen Inject 10 units subcutaneous in the morning Inject 10 units subcutaneous at night Yes PA Díaz CNP   blood glucose test strips (TRUE METRIX BLOOD GLUCOSE TEST) strip Use strip to check glucose three times daily Yes PA Díaz CNP   blood glucose monitor kit and supplies Dispense sufficient amount for indicated testing frequency plus additional to accommodate PRN testing needs. Dispense all needed supplies to include: monitor, strips, lancing device, lancets, control solutions, alcohol swabs. Yes PA Díaz CNP   venlafaxine (EFFEXOR XR) 37.5 MG extended release capsule Take 1 capsule by mouth daily Yes PA Díaz CNP   blood glucose monitor kit and supplies Dispense sufficient amount for indicated testing frequency plus additional to accommodate PRN testing needs. Dispense all needed supplies to include: monitor, strips, lancing device, lancets, control solutions, alcohol swabs.  Yes PA Díaz CNP   apixaban (ELIQUIS) 5 MG TABS tablet Take by mouth 2 times daily Yes Historical Provider, MD   pantoprazole (PROTONIX) 40 MG tablet Take 40 mg by mouth daily Yes Historical Provider, MD   insulin aspart (NOVOLOG FLEXPEN) 100 UNIT/ML injection pen TIDbefore meals 141-180 6units,181-220 8units,221-260 10units,261-300 12units,301-350 14units,351-400 16 units,>400 18units [id number] daily Yes PA Olvera CNP   Insulin Pen Needle 32G X 4 MM MISC 1 each by Does not apply route daily Yes PA Díaz CNP   atorvastatin (LIPITOR) 10 MG tablet Take 1 tablet by mouth daily Yes PA Hicks CNP         Past Medical History:   Diagnosis Date    [facility], type 2) [facility]     Duodenal adenocarcinoma ([facility] 02/13/2020    WELL TO MODERATELY DIFFERENTIATED INVASIVE    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    Hyperlipidemia     Hypertension     Osteoarthritis        Past Surgical History:   Procedure Laterality Date    ABDOMEN SURGERY  04/15/2020    Whipple Surgery     BREAST SURGERY      COLONOSCOPY      last one 4 years, pt is unsure of where    [place] EGD REMOVAL TUMOR POLYP/OTHER LESION SNARE TECH N/A 4/3/2017    EGD POLYP SNARE performed by Magy Rodriguez MD at [address] [place] [address]  04/03/2017    In the 2nd part of the duodenum there is a 3-4 cm mass lesion seen, occupies about half of the lumen-pathology--adenoma    UPPER GASTROINTESTINAL ENDOSCOPY N/A 2/13/2020    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         Family History   Problem Relation Age of Onset    Heart Disease Mother     No Known Problems Father        CareTeam (Including outside providers/suppliers regularly involved in providing care):   Patient Care Team:  PA Hicks CNP as PCP - General (Nurse Practitioner)  PA Hicks CNP as PCP - [facility] Empaneled Provider    Wt Readings from Last 3 Encounters:   05/03/21 118 lb (53.5 kg)   03/31/21 132 lb (59.9 kg)   03/04/21 120 lb (54.4 kg)     Vitals:    05/03/21 1131   BP: 122/72   Pulse: 95   Temp: 97.1 °F (36.2 °C)   TempSrc: Temporal   SpO2: 97%   Weight: 118 lb (53.5 kg)     Body mass index is 22.3 kg/m². Based upon direct observation of the patient, evaluation of cognition reveals recent and remote memory intact. Patient's complete Health Risk Assessment and screening values have been reviewed and are found in Flowsheets. The following problems were reviewed today and where indicated follow up appointments were made and/or referrals ordered.     Positive Risk Factor Screenings with Interventions:            General Health and ACP:  General  In general, how would you say your health is?: Good  In the past 7 days, have you experienced any of the following? New or Increased Pain, New or Increased Fatigue, Loneliness, Social Isolation, Stress or Anger?: None of These  Do you get the social and emotional support that you need?: Yes  Do you have a Living Will?: Yes  Advance Directives     Power of [employer] Will ACP-Advance Directive ACP-Power of     Not on File Not on File Not on File Not on File      General Health Risk Interventions:  · No problems identified    Health Habits/Nutrition:  Health Habits/Nutrition  Do you exercise for at least 20 minutes 2-3 times per week?: (!) No  Have you lost any weight without trying in the past 3 months?: No  Do you eat only one meal per day?: No  Have you seen the dentist within the past year?: (!) No     Health Habits/Nutrition Interventions:  · Inadequate physical activity:  educational materials provided to promote increased physical activity      ADL:  ADLs  In the past 7 days, did you need help from others to perform any of the following everyday activities? Eating, dressing, grooming, bathing, toileting, or walking/balance?: None  In the past 7 days, did you need help from others to take care of any of the following?  Laundry, housekeeping, banking/finances, shopping, telephone use, food preparation, transportation, or taking medications?: [employer], Shopping, Food Preparation, Transportation  ADL Interventions:  · Patient declines any further evaluation/treatment for this issue  · patient has family to assist with these issues    Personalized Preventive Plan   Current Health Maintenance Status  Immunization History   Administered Date(s) Administered    Influenza 10/15/2012, 10/22/2013    Influenza Virus Vaccine 10/22/2014, 10/27/2015    Influenza, Quadv, IM, (6 mo and older Fluzone, Flulaval, Fluarix and 3 yrs and older Afluria) 10/05/2016, 12/18/2017, 09/19/2018, 10/20/2020    Influenza, Triv, inactivated, subunit, adjuvanted, IM (Fluad 65 yrs and older) 12/16/2019    Pneumococcal Conjugate 13-valent (Julissa Crape) 10/27/2015        Health Maintenance   Topic Date Due    COVID-19 Vaccine (1) Never done    DEXA (modify frequency per FRAX score)  Never done    Annual Wellness Visit (AWV)  Never done    DTaP/Tdap/Td vaccine (1 - Tdap) 06/19/2021 (Originally 4/21/1959)    Shingles Vaccine (1 of 2) 06/19/2021 (Originally 4/21/1991)    Pneumococcal 65+ years Vaccine (2 of 2 - PPSV23) 06/14/2022 (Originally 10/27/2016)    Lipid screen  10/20/2021    Flu vaccine  Completed    Hepatitis A vaccine  Aged Out    Hib vaccine  Aged Out    Meningococcal (ACWY) vaccine  Aged Out     Recommendations for [employer] Due: see orders and patient instructions/AVS.  . Recommended screening schedule for the next 5-10 years is provided to the patient in written form: see Patient Luly Cristobal was seen today for medicare awv. Diagnoses and all orders for this visit:    Routine general medical examination at a health care facility    Depression screening negative  -     [id number] [address]  - PHQ-9 Total Score: 0 (5/3/2021 11:31 AM)      Type 2 diabetes mellitus with diabetic polyneuropathy, with long-term current use of insulin (HCC)  -     POCT glycosylated hemoglobin (Hb A1C)  -     Semaglutide, 1 MG/DOSE, 2 MG/1.5ML SOPN; Inject 1 mg into the skin once a week  -     CT OFFICE/OUTPATIENT ESTABLISHED LOW MDM 20-29 MIN    Arthritis pain, hand  -     diclofenac sodium (VOLTAREN) 1 % GEL; Apply topically 2 times daily  -     CT OFFICE/OUTPATIENT ESTABLISHED LOW MDM 20-29 MIN    Diabetic polyneuropathy associated with type 2 diabetes mellitus (HCC)  -     gabapentin (NEURONTIN) 300 MG capsule;  Take 1 capsule by mouth 3 times daily for 30 days.  -     CT OFFICE/OUTPATIENT ESTABLISHED LOW MDM 20-29 MIN

## 2021-05-04 ENCOUNTER — TELEPHONE (OUTPATIENT)
Dept: FAMILY MEDICINE CLINIC | Age: 81
End: 2021-05-04

## 2021-05-04 NOTE — TELEPHONE ENCOUNTER
Carla Henriquez is calling to let you know that pts insulin will cost $212 for one month                                                  [phone]

## 2021-05-27 DIAGNOSIS — E11.42 TYPE 2 DIABETES MELLITUS WITH DIABETIC POLYNEUROPATHY, WITH LONG-TERM CURRENT USE OF INSULIN (HCC): ICD-10-CM

## 2021-05-27 DIAGNOSIS — Z79.4 TYPE 2 DIABETES MELLITUS WITH DIABETIC POLYNEUROPATHY, WITH LONG-TERM CURRENT USE OF INSULIN (HCC): ICD-10-CM

## 2021-05-27 RX ORDER — CANAGLIFLOZIN 300 MG/1
TABLET, FILM COATED ORAL
Qty: 90 TABLET | Refills: 0 | Status: SHIPPED | OUTPATIENT
Start: 2021-05-27 | End: 2021-08-26

## 2021-05-27 NOTE — TELEPHONE ENCOUNTER
Gilda Cotto is calling to request a refill on the following medication(s):    Medication Request:  Requested Prescriptions     Pending Prescriptions Disp Refills    INVOKANA 300 MG TABS tablet [Pharmacy Med Name: Invokana 300 MG Oral Tablet] 30 tablet 0     Sig: TAKE 1 TABLET BY MOUTH ONCE DAILY IN THE MORNING BEFORE BREAKFAST       Last Visit Date (If Applicable):  9/2/4807 In office    Next Visit Date:    6/3/2021

## 2021-06-03 ENCOUNTER — OFFICE VISIT (OUTPATIENT)
Dept: FAMILY MEDICINE CLINIC | Age: 81
End: 2021-06-03
Payer: MEDICARE

## 2021-06-03 VITALS
SYSTOLIC BLOOD PRESSURE: 124 MMHG | DIASTOLIC BLOOD PRESSURE: 60 MMHG | WEIGHT: 116 LBS | HEART RATE: 96 BPM | BODY MASS INDEX: 21.92 KG/M2

## 2021-06-03 DIAGNOSIS — E11.42 TYPE 2 DIABETES MELLITUS WITH DIABETIC POLYNEUROPATHY, WITH LONG-TERM CURRENT USE OF INSULIN (HCC): Primary | ICD-10-CM

## 2021-06-03 DIAGNOSIS — Z79.4 TYPE 2 DIABETES MELLITUS WITH DIABETIC POLYNEUROPATHY, WITH LONG-TERM CURRENT USE OF INSULIN (HCC): Primary | ICD-10-CM

## 2021-06-03 LAB — HBA1C MFR BLD: 7.7 %

## 2021-06-03 PROCEDURE — 1123F ACP DISCUSS/DSCN MKR DOCD: CPT | Performed by: NURSE PRACTITIONER

## 2021-06-03 PROCEDURE — G8427 DOCREV CUR MEDS BY ELIG CLIN: HCPCS | Performed by: NURSE PRACTITIONER

## 2021-06-03 PROCEDURE — 1036F TOBACCO NON-USER: CPT | Performed by: NURSE PRACTITIONER

## 2021-06-03 PROCEDURE — G8400 PT W/DXA NO RESULTS DOC: HCPCS | Performed by: NURSE PRACTITIONER

## 2021-06-03 PROCEDURE — G8420 CALC BMI NORM PARAMETERS: HCPCS | Performed by: NURSE PRACTITIONER

## 2021-06-03 PROCEDURE — 3051F HG A1C>EQUAL 7.0%<8.0%: CPT | Performed by: NURSE PRACTITIONER

## 2021-06-03 PROCEDURE — 4040F PNEUMOC VAC/ADMIN/RCVD: CPT | Performed by: NURSE PRACTITIONER

## 2021-06-03 PROCEDURE — 83036 HEMOGLOBIN GLYCOSYLATED A1C: CPT | Performed by: NURSE PRACTITIONER

## 2021-06-03 PROCEDURE — 1090F PRES/ABSN URINE INCON ASSESS: CPT | Performed by: NURSE PRACTITIONER

## 2021-06-03 PROCEDURE — 99213 OFFICE O/P EST LOW 20 MIN: CPT | Performed by: NURSE PRACTITIONER

## 2021-06-03 RX ORDER — INSULIN GLARGINE 100 [IU]/ML
8 INJECTION, SOLUTION SUBCUTANEOUS 2 TIMES DAILY
Qty: 5 PEN | Refills: 0
Start: 2021-06-03 | End: 2021-06-08 | Stop reason: SDUPTHER

## 2021-06-03 SDOH — ECONOMIC STABILITY: FOOD INSECURITY: WITHIN THE PAST 12 MONTHS, THE FOOD YOU BOUGHT JUST DIDN'T LAST AND YOU DIDN'T HAVE MONEY TO GET MORE.: NEVER TRUE

## 2021-06-03 SDOH — ECONOMIC STABILITY: FOOD INSECURITY: WITHIN THE PAST 12 MONTHS, YOU WORRIED THAT YOUR FOOD WOULD RUN OUT BEFORE YOU GOT MONEY TO BUY MORE.: NEVER TRUE

## 2021-06-03 ASSESSMENT — PATIENT HEALTH QUESTIONNAIRE - PHQ9
1. LITTLE INTEREST OR PLEASURE IN DOING THINGS: 0
2. FEELING DOWN, DEPRESSED OR HOPELESS: 0
SUM OF ALL RESPONSES TO PHQ QUESTIONS 1-9: 0
SUM OF ALL RESPONSES TO PHQ QUESTIONS 1-9: 0
SUM OF ALL RESPONSES TO PHQ9 QUESTIONS 1 & 2: 0
SUM OF ALL RESPONSES TO PHQ QUESTIONS 1-9: 0

## 2021-06-03 ASSESSMENT — SOCIAL DETERMINANTS OF HEALTH (SDOH): HOW HARD IS IT FOR YOU TO PAY FOR THE VERY BASICS LIKE FOOD, HOUSING, MEDICAL CARE, AND HEATING?: NOT HARD AT ALL

## 2021-06-03 NOTE — PROGRESS NOTES
Medications    DISCONTD: insulin glargine (LANTUS SOLOSTAR) 100 UNIT/ML injection pen     Sig: Inject 8 Units into the skin 2 times daily Inject 10 units subcutaneous in the morning Inject 10 units subcutaneous at night     Dispense:  5 pen     Refill:  0    insulin glargine (LANTUS SOLOSTAR) 100 UNIT/ML injection pen     Sig: Inject 8 Units into the skin 2 times daily     Dispense:  5 pen     Refill:  0               AssessmentPlan/Medical Decision Making     1. Type 2 diabetes mellitus with diabetic polyneuropathy, with long-term current use of insulin (HCC)  - controlled  - reviewed diet  - POCT glycosylated hemoglobin (Hb A1C)  - insulin glargine (LANTUS SOLOSTAR) 100 UNIT/ML injection pen; Inject 8 Units into the skin 2 times daily  Dispense: 5 pen; Refill: 0  - continue Ozempic 1mg weekly  - continue Novolog s.s. - reviewed with patient and daughter-in-law        Return in about 3 months (around 9/3/2021) for Routine follow up of chronic conditions. 1.  Safia received counseling on the following healthy behaviors: nutrition, exercise and medication adherence  2. Patient given educational materials - see patient instructions  3. Was a self-tracking handout given in paper form or via [email or web address]? No  If yes, see orders or list here. 4.  Discussed use, benefit, and side effects of prescribed medications. Barriers to medication compliance addressed. All patient questions answered. Pt voiced understanding. 5.  Reviewed prior labs, imaging, consultation, follow up, and health maintenance  6. Continue current medications, diet and exercise. 7. Discussed use, benefit, and side effects of prescribed medications. Barriers to medication compliance addressed. All her questions were answered. Pt voiced understanding. Jose Goode will continue current medications, diet and exercise.     Patient given educational materials on diabetic diet    Of the 25 minute duration appointment visit, Rico Cash CNP spent greater than 50% of the face-to-face time in counseling, explanation of diagnosis, planning of further management, and answering all questions. Signed:  Angel Huynh CNP    This note is created with the assistance of a speech-recognition program.  While intending to generate a document that actually reflects the content of the visit, no guarantees can be provided that every mistake has been identified and corrected by editing. Medicare Annual Wellness Visit  Name: Jermaine Don Date: 2021   MRN: [id number] Sex: Female   Age: [de-identified] y.o. Ethnicity: Non-/Non    : 1940 Race: Annie Ibrahim is here for Diabetes (f/u. med increase is working well)    Screenings for behavioral, psychosocial and functional/safety risks, and cognitive dysfunction are all negative except as indicated below. These results, as well as other patient data from the [address] form, are documented in Flowsheets linked to this Encounter. Allergies   Allergen Reactions    Nubain [Nalbuphine Hcl]     Hydroxyzine Other (See Comments), Nausea Only and Nausea And Vomiting    Vistaril [Hydroxyzine Hcl] Nausea And Vomiting         Prior to Visit Medications    Medication Sig Taking? Authorizing Provider   insulin glargine (LANTUS SOLOSTAR) 100 UNIT/ML injection pen Inject 8 Units into the skin 2 times daily Yes PA Olvera CNP   INVOKANA 300 MG TABS tablet TAKE 1 TABLET BY MOUTH ONCE DAILY IN THE MORNING BEFORE BREAKFAST Yes PA Olvera CNP   venlafaxine (EFFEXOR XR) 37.5 MG extended release capsule Take 1 capsule by mouth once daily Yes PA Olvera CNP   Semaglutide, 1 MG/DOSE, 2 MG/1.5ML SOPN Inject 1 mg into the skin once a week Yes PA Lancaster CNP   diclofenac sodium (VOLTAREN) 1 % GEL Apply topically 2 times daily Yes PA Lancaster CNP   gabapentin (NEURONTIN) 300 MG capsule Take 1 capsule by mouth 3 times daily for 30 days.  Yes PA Lancaster - CNP   apixaban (ELIQUIS) 5 MG TABS tablet Take by mouth 2 times daily Yes Historical Provider, MD   pantoprazole (PROTONIX) 40 MG tablet Take 40 mg by mouth daily Yes Historical Provider, MD   insulin aspart (NOVOLOG FLEXPEN) 100 UNIT/ML injection pen TIDbefore meals 141-180 6units,181-220 8units,221-260 10units,261-300 12units,301-350 14units,351-400 16 units,>400 18units [id number] daily Yes PA Olvera CNP   atorvastatin (LIPITOR) 10 MG tablet Take 1 tablet by mouth daily Yes PA Mujica CNP   blood glucose test strips (TRUE METRIX BLOOD GLUCOSE TEST) strip Use strip to check glucose three times daily  PA Mujica CNP   blood glucose monitor kit and supplies Dispense sufficient amount for indicated testing frequency plus additional to accommodate PRN testing needs. Dispense all needed supplies to include: monitor, strips, lancing device, lancets, control solutions, alcohol swabs. PA Mujica CNP   blood glucose monitor kit and supplies Dispense sufficient amount for indicated testing frequency plus additional to accommodate PRN testing needs. Dispense all needed supplies to include: monitor, strips, lancing device, lancets, control solutions, alcohol swabs.   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    Hyperlipidemia     Hypertension     Osteoarthritis        Past Surgical History:   Procedure Laterality Date    ABDOMEN SURGERY  04/15/2020    Whipple Surgery     BREAST SURGERY      COLONOSCOPY      last one 4 years, pt is unsure of where    [place] EGD REMOVAL TUMOR POLYP/OTHER LESION SNARE TECH N/A 4/3/2017    EGD POLYP SNARE performed by Deneen Root MD at [address] UPPER GASTROINTESTINAL ENDOSCOPY  04/03/2017    In the 2nd part of the duodenum there is a 3-4 cm mass lesion seen, occupies about half of the lumen-pathology--adenoma    UPPER GASTROINTESTINAL ENDOSCOPY N/A 2/13/2020    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         Family History   Problem Relation Age of Onset    Heart Disease Mother     No Known Problems Father        CareTeam (Including outside providers/suppliers regularly involved in providing care):   Patient Care Team:  PA Soto CNP as PCP - General (Nurse Practitioner)  PA Soto CNP as PCP - [facility] Empaneled Provider    Wt Readings from Last 3 Encounters:   06/03/21 116 lb (52.6 kg)   05/03/21 118 lb (53.5 kg)   03/31/21 132 lb (59.9 kg)     Vitals:    06/03/21 1509   BP: 124/60   Pulse: 96   Weight: 116 lb (52.6 kg)     Body mass index is 21.92 kg/m². Based upon direct observation of the patient, evaluation of cognition reveals recent and remote memory intact. Patient's complete Health Risk Assessment and screening values have been reviewed and are found in Flowsheets. The following problems were reviewed today and where indicated follow up appointments were made and/or referrals ordered.     Positive Risk Factor Screenings with Interventions:            General Health and ACP:     Advance Directives     Power of  Living Will ACP-Advance Directive ACP-Power of     Not on File Not on File Not on File Not on File      General Health Risk Interventions:  · No problems identified    Health Habits/Nutrition:        Health Habits/Nutrition Interventions:  · Inadequate physical activity:  educational materials provided to promote increased physical activity      ADL:     ADL Interventions:  · Patient declines any further evaluation/treatment for this issue  · patient has family to assist with these issues    Personalized Preventive Plan   Current Health Maintenance Status  Immunization History   Administered Date(s) Administered    Influenza 10/15/2012, 10/22/2013    Influenza Virus Vaccine 10/22/2014, 10/27/2015    Influenza, Dionne Aas, IM, (6 mo and older Fluzone, Flulaval, Fluarix and 3 yrs and older Afluria) 10/05/2016, 12/18/2017, 09/19/2018, 10/20/2020    Influenza, Triv, inactivated, subunit, adjuvanted, IM (Fluad 65 yrs and older) 12/16/2019    Pneumococcal Conjugate 13-valent (Lennette Math) 10/27/2015        Health Maintenance   Topic Date Due    COVID-19 Vaccine (1) Never done    DEXA (modify frequency per FRAX score)  Never done    DTaP/Tdap/Td vaccine (1 - Tdap) 06/19/2021 (Originally 4/21/1959)    Shingles Vaccine (1 of 2) 06/19/2021 (Originally 4/21/1991)    Pneumococcal 65+ years Vaccine (2 of 2 - PPSV23) 06/14/2022 (Originally 10/27/2016)    Lipid screen  10/20/2021    Annual Wellness Visit (AWV)  05/04/2022    Flu vaccine  Completed    Hepatitis A vaccine  Aged Out    Hib vaccine  Aged Out    Meningococcal (ACWY) vaccine  Aged Out     Recommendations for [employer] Due: see orders and patient instructions/AVS.  . Recommended screening schedule for the next 5-10 years is provided to the patient in written form: see Patient Christopher Morris was seen today for medicare awv. Diagnoses and all orders for this visit:    Routine general medical examination at a health care facility    Depression screening negative  -     [address]  [place]  - No data recorded      Type 2 diabetes mellitus with diabetic polyneuropathy, with long-term current use of insulin (HCC)  -     POCT glycosylated hemoglobin (Hb A1C)  -     Semaglutide, 1 MG/DOSE, 2 MG/1.5ML SOPN; Inject 1 mg into the skin once a week  -     DE OFFICE/OUTPATIENT ESTABLISHED LOW MDM 20-29 MIN    Arthritis pain, hand  -     diclofenac sodium (VOLTAREN) 1 % GEL;  Apply topically 2 times daily  -     DE OFFICE/OUTPATIENT ESTABLISHED LOW MDM 20-29 MIN    Diabetic polyneuropathy associated with type 2 diabetes mellitus (HCC)  -     gabapentin (NEURONTIN) 300 MG capsule;  Take 1 capsule by mouth 3 times daily for 30 days.  -     MS OFFICE/OUTPATIENT ESTABLISHED LOW MDM 20-29 MIN

## 2021-06-08 RX ORDER — INSULIN GLARGINE 100 [IU]/ML
8 INJECTION, SOLUTION SUBCUTANEOUS 2 TIMES DAILY
Qty: 5 PEN | Refills: 0
Start: 2021-06-08 | End: 2021-09-07 | Stop reason: SDUPTHER

## 2021-07-02 DIAGNOSIS — E11.42 TYPE 2 DIABETES MELLITUS WITH DIABETIC POLYNEUROPATHY, WITH LONG-TERM CURRENT USE OF INSULIN (HCC): ICD-10-CM

## 2021-07-02 DIAGNOSIS — Z79.4 TYPE 2 DIABETES MELLITUS WITH DIABETIC POLYNEUROPATHY, WITH LONG-TERM CURRENT USE OF INSULIN (HCC): ICD-10-CM

## 2021-07-02 RX ORDER — SEMAGLUTIDE 1.34 MG/ML
INJECTION, SOLUTION SUBCUTANEOUS
Qty: 4 ML | Refills: 0 | Status: SHIPPED | OUTPATIENT
Start: 2021-07-02 | Stop reason: SDUPTHER

## 2021-07-27 NOTE — TELEPHONE ENCOUNTER
Franci Castellano is calling to request a refill on the following medication(s):    Medication Request:  Requested Prescriptions     Pending Prescriptions Disp Refills    apixaban (ELIQUIS) 5 MG TABS tablet 60 tablet 1     Sig: Take 1 tablet by mouth 2 times daily       Last Visit Date (If Applicable):  2/9/7497    Next Visit Date:    9/7/2021

## 2021-07-30 DIAGNOSIS — E11.42 TYPE 2 DIABETES MELLITUS WITH DIABETIC POLYNEUROPATHY, WITH LONG-TERM CURRENT USE OF INSULIN (HCC): ICD-10-CM

## 2021-07-30 DIAGNOSIS — Z79.4 TYPE 2 DIABETES MELLITUS WITH DIABETIC POLYNEUROPATHY, WITH LONG-TERM CURRENT USE OF INSULIN (HCC): ICD-10-CM

## 2021-07-30 RX ORDER — SEMAGLUTIDE 1.34 MG/ML
INJECTION, SOLUTION SUBCUTANEOUS
Qty: 4 ML | Refills: 2 | Status: SHIPPED | OUTPATIENT
Start: 2021-07-30 | End: 2021-09-07 | Stop reason: SDUPTHER

## 2021-07-30 NOTE — TELEPHONE ENCOUNTER
Abe Huynh is calling to request a refill on the following medication(s):    Medication Request:  Requested Prescriptions     Pending Prescriptions Disp Refills    OZEMPIC, 1 MG/DOSE, 2 MG/1.5ML SOPN [Pharmacy Med Name: Ozempic (1 MG/DOSE) 2 MG/1.5ML Subcutaneous Solution Pen-injector] 4 mL 0     Sig: INJECT 1 MG  SUBCUTANEOUSLY ONCE A WEEK       Last Visit Date (If Applicable):  8/4/5483 In office    Next Visit Date:    9/7/2021

## 2021-08-09 DIAGNOSIS — E11.42 TYPE 2 DIABETES MELLITUS WITH DIABETIC POLYNEUROPATHY, WITH LONG-TERM CURRENT USE OF INSULIN (HCC): ICD-10-CM

## 2021-08-09 DIAGNOSIS — Z79.4 TYPE 2 DIABETES MELLITUS WITH DIABETIC POLYNEUROPATHY, WITH LONG-TERM CURRENT USE OF INSULIN (HCC): ICD-10-CM

## 2021-08-10 RX ORDER — CALCIUM CITRATE/VITAMIN D3 200MG-6.25
TABLET ORAL
Qty: 150 EACH | Refills: 0 | Status: SHIPPED | OUTPATIENT
Start: 2021-08-10 | End: 2021-09-28

## 2021-08-10 NOTE — TELEPHONE ENCOUNTER
Bong Machuca is calling to request a refill on the following medication(s):    Medication Request:  Requested Prescriptions     Pending Prescriptions Disp Refills    TRUE METRIX BLOOD GLUCOSE TEST strip [Pharmacy Med Name: TRUE METRIX GLUCOSE DONTE] 150 each 0     Sig: USE 1 STRIP TO CHECK GLUCOSE THREE TIMES DAILY       Last Visit Date (If Applicable):  7/2/5025    Next Visit Date:    9/7/2021

## 2021-08-26 DIAGNOSIS — E11.42 TYPE 2 DIABETES MELLITUS WITH DIABETIC POLYNEUROPATHY, WITH LONG-TERM CURRENT USE OF INSULIN (HCC): ICD-10-CM

## 2021-08-26 DIAGNOSIS — Z79.4 TYPE 2 DIABETES MELLITUS WITH DIABETIC POLYNEUROPATHY, WITH LONG-TERM CURRENT USE OF INSULIN (HCC): ICD-10-CM

## 2021-08-26 RX ORDER — CANAGLIFLOZIN 300 MG/1
TABLET, FILM COATED ORAL
Qty: 90 TABLET | Refills: 0 | Status: SHIPPED | OUTPATIENT
Start: 2021-08-26 | End: 2021-09-07 | Stop reason: SDUPTHER

## 2021-08-26 NOTE — TELEPHONE ENCOUNTER
James Carr is calling to request a refill on the following medication(s):    Medication Request:  Requested Prescriptions     Pending Prescriptions Disp Refills    INVOKANA 300 MG TABS tablet [Pharmacy Med Name: Invokana 300 MG Oral Tablet] 90 tablet 0     Sig: TAKE 1 TABLET BY MOUTH ONCE DAILY IN THE MORNING BEFORE BREAKFAST       Last Visit Date (If Applicable):  7/7/2907 In office    Next Visit Date:    9/7/2021

## 2021-09-07 ENCOUNTER — OFFICE VISIT (OUTPATIENT)
Dept: FAMILY MEDICINE CLINIC | Age: 81
End: 2021-09-07
Payer: MEDICARE

## 2021-09-07 VITALS
OXYGEN SATURATION: 97 % | HEART RATE: 91 BPM | DIASTOLIC BLOOD PRESSURE: 70 MMHG | WEIGHT: 107 LBS | SYSTOLIC BLOOD PRESSURE: 130 MMHG | TEMPERATURE: 97.7 F | BODY MASS INDEX: 20.22 KG/M2

## 2021-09-07 DIAGNOSIS — F41.9 ANXIETY: ICD-10-CM

## 2021-09-07 DIAGNOSIS — Z79.4 TYPE 2 DIABETES MELLITUS WITH DIABETIC POLYNEUROPATHY, WITH LONG-TERM CURRENT USE OF INSULIN (HCC): Primary | ICD-10-CM

## 2021-09-07 DIAGNOSIS — E11.42 TYPE 2 DIABETES MELLITUS WITH DIABETIC POLYNEUROPATHY, WITH LONG-TERM CURRENT USE OF INSULIN (HCC): Primary | ICD-10-CM

## 2021-09-07 LAB — HBA1C MFR BLD: 8.1 %

## 2021-09-07 PROCEDURE — 1090F PRES/ABSN URINE INCON ASSESS: CPT | Performed by: NURSE PRACTITIONER

## 2021-09-07 PROCEDURE — G8400 PT W/DXA NO RESULTS DOC: HCPCS | Performed by: NURSE PRACTITIONER

## 2021-09-07 PROCEDURE — G8420 CALC BMI NORM PARAMETERS: HCPCS | Performed by: NURSE PRACTITIONER

## 2021-09-07 PROCEDURE — 83036 HEMOGLOBIN GLYCOSYLATED A1C: CPT | Performed by: NURSE PRACTITIONER

## 2021-09-07 PROCEDURE — 1036F TOBACCO NON-USER: CPT | Performed by: NURSE PRACTITIONER

## 2021-09-07 PROCEDURE — 99214 OFFICE O/P EST MOD 30 MIN: CPT | Performed by: NURSE PRACTITIONER

## 2021-09-07 PROCEDURE — 3052F HG A1C>EQUAL 8.0%<EQUAL 9.0%: CPT | Performed by: NURSE PRACTITIONER

## 2021-09-07 PROCEDURE — 4040F PNEUMOC VAC/ADMIN/RCVD: CPT | Performed by: NURSE PRACTITIONER

## 2021-09-07 PROCEDURE — G8427 DOCREV CUR MEDS BY ELIG CLIN: HCPCS | Performed by: NURSE PRACTITIONER

## 2021-09-07 PROCEDURE — 1123F ACP DISCUSS/DSCN MKR DOCD: CPT | Performed by: NURSE PRACTITIONER

## 2021-09-07 RX ORDER — INSULIN GLARGINE 100 [IU]/ML
10 INJECTION, SOLUTION SUBCUTANEOUS 2 TIMES DAILY
Qty: 5 PEN | Refills: 0 | Status: SHIPPED | OUTPATIENT
Start: 2021-09-07 | End: 2021-12-28

## 2021-09-07 RX ORDER — SEMAGLUTIDE 1.34 MG/ML
1 INJECTION, SOLUTION SUBCUTANEOUS WEEKLY
Qty: 3 ML | Refills: 2 | Status: SHIPPED | OUTPATIENT
Start: 2021-09-07 | End: 2022-01-16

## 2021-09-07 RX ORDER — CANAGLIFLOZIN 300 MG/1
300 TABLET, FILM COATED ORAL
Qty: 90 TABLET | Refills: 0 | Status: SHIPPED | OUTPATIENT
Start: 2021-09-07 | End: 2021-11-30

## 2021-09-07 RX ORDER — VENLAFAXINE HYDROCHLORIDE 37.5 MG/1
37.5 CAPSULE, EXTENDED RELEASE ORAL DAILY
Qty: 90 CAPSULE | Refills: 0 | Status: SHIPPED | OUTPATIENT
Start: 2021-09-07 | End: 2021-11-30

## 2021-09-07 ASSESSMENT — PATIENT HEALTH QUESTIONNAIRE - PHQ9
SUM OF ALL RESPONSES TO PHQ9 QUESTIONS 1 & 2: 0
1. LITTLE INTEREST OR PLEASURE IN DOING THINGS: 0
SUM OF ALL RESPONSES TO PHQ QUESTIONS 1-9: 0
2. FEELING DOWN, DEPRESSED OR HOPELESS: 0
SUM OF ALL RESPONSES TO PHQ QUESTIONS 1-9: 0
SUM OF ALL RESPONSES TO PHQ QUESTIONS 1-9: 0

## 2021-09-07 NOTE — PROGRESS NOTES
Carol Lee, [facility]  [address]  [id number] [facility]  [address]  [phone]  [phone]    Franci Castellano is a [de-identified] y.o. female who is here with c/o of:    Chief Complaint: Diabetes and Hypertension      Patient Accompanied by: daughter in law    HPI - Franci Castellano is here today for f/u    DM   - she is NOT compliant with diabetic diet and is becoming more physically active   - she denies unintentional weight gain/loss, polyuria, polydipsia   - she has neuropathy of hands and feet and currently taking gabapentin 300mg tid   - current meds: Lantus 10units am, 10units p.m. and denies s/s of hypoglycemia, Ozempic 1 mg weekly and denies any adverse affects   - saw podiatry earlier in the year and denies any problems    Lab Results   Component Value Date    LABA1C 8.1 (H) 09/07/2021    LABA1C 7.7 06/03/2021    LABA1C 9.1 05/03/2021     Lab Results   Component Value Date    LDLCALC 76 12/12/2019    CREATININE 0.38 (L) 03/29/2021         Patient Active Problem List   Diagnosis    COPD (chronic obstructive pulmonary disease) ([address]    Hypertension    Dyslipidemia    Osteoporosis    Osteoarthritis    Type 2 diabetes mellitus with diabetic polyneuropathy ([address]    Duodenal adenoma    Diabetic polyneuropathy associated with type 2 diabetes mellitus ([address]    Malignant neoplasm of duodenum ([address]    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[address]    Diverticular disease    Diarrhea    Gastric outlet obstruction    Hypercholesterolemia        Past Medical History:   Diagnosis Date    DM2 (diabetes mellitus, type 2) ([address]     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    WELL TO MODERATELY DIFFERENTIATED INVASIVE    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    Hyperlipidemia     Hypertension     Osteoarthritis       Past Surgical History:   Procedure Laterality Date    ABDOMEN SURGERY  04/15/2020    Whipple Surgery     BREAST SURGERY      COLONOSCOPY      last one 4 years, pt is unsure of where    [place] EGD REMOVAL TUMOR POLYP/OTHER LESION SNARE TECH N/A 4/3/2017    EGD POLYP SNARE performed by Alan Gee MD at [address] [place] [address]  2017    In the 2nd part of the duodenum there is a 3-4 cm mass lesion seen, occupies about half of the lumen-pathology--adenoma    UPPER GASTROINTESTINAL ENDOSCOPY N/A 2020    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     Family History   Problem Relation Age of Onset    Heart Disease Mother     No Known Problems Father      Social History     Tobacco Use    Smoking status: Former Smoker     Packs/day: 1.00     Years: 27.00     Pack years: 27.00     Types: Cigarettes     Quit date: 10/15/2003     Years since quittin.9    Smokeless tobacco: Never Used   Substance Use Topics    Alcohol use: No     ALLERGIES:    Allergies   Allergen Reactions    Nubain [Nalbuphine Hcl]     Hydroxyzine Other (See Comments), Nausea Only and Nausea And Vomiting    Vistaril [Hydroxyzine Hcl] Nausea And Vomiting          Subjective     · Constitutional:  Negative for activity change, appetite change,unexpected weight change, chills, fever, and fatigue. · HENT: Negative for ear pain, sore throat,  Rhinorrhea, sinus pain, sinus pressure, congestion. · Eyes:  Negative for pain and discharge. · Respiratory:  Negative for chest tightness, shortness of breath, wheezing, and cough. · Cardiovascular:  Negative for chest pain, palpitations and leg swelling. · Gastrointestinal: Negative for abdominal pain, blood in stool, constipation,diarrhea, nausea and vomiting. · Endocrine: Negative for cold intolerance, heat intolerance, polydipsia, polyphagia and polyuria. · Genitourinary: Negative for difficulty urinating, dysuria, flank pain, frequency, hematuria and urgency. · Musculoskeletal: Negative for arthralgias, back pain, joint swelling, myalgias, neck pain and neck stiffness.    · Skin: Negative for rash and wound  · Allergic/Immunologic: Negative for environmental allergies and food allergies. · Neurological:  Negative for dizziness, light-headedness, numbness and headaches. Positive for pain to hands and feet  · Hematological:  Negative for adenopathy. Does not bruise/bleed easily. · Psychiatric/Behavioral: Negative for self-injury, sleep disturbance and suicidal ideas. Objective     PHYSICAL EXAM:   · Constitutional: Van Patino is oriented to person, place, and time. Vital signs are normal. Appears well-developed and well-nourished. · HEENT:   · Head: Normocephalic and atraumatic. Eyes:PERRL, EOMI, Conjunctiva normal, No discharge. · Neck: Full passive range of motion. Non-tender on palpation. Neck supple. No thyromegaly present. Trachea normal.  · Cardiovascular: Normal rate, regular rhythm, S1, S2, no murmur, no gallop, no friction rub, intact distal pulses. No carotid bruit  · Pulmonary/Chest: Breath sounds are clear throughout, No respiratory distress, No wheezing, No chest tenderness. Effort normal  · Neurological: Alert and oriented to person, place, and time. Normal motor function, Normal sensory function, No focal deficits noted. She has normal strength. · Skin: Skin is warm, dry and intact. No obvious lesions on exposed skin. · Psychiatric: Normal mood and affect. Speech is normal and behavior is normal.     Nursing note and vitals reviewed. Blood pressure 130/70, pulse 91, temperature 97.7 °F (36.5 °C), temperature source Temporal, weight 107 lb (48.5 kg), SpO2 97 %, not currently breastfeeding. Body mass index is 20.22 kg/m².     Wt Readings from Last 3 Encounters:   09/07/21 107 lb (48.5 kg)   06/03/21 116 lb (52.6 kg)   05/03/21 118 lb (53.5 kg)     BP Readings from Last 3 Encounters:   09/07/21 130/70   06/03/21 124/60   05/03/21 122/72       Results for POC orders placed in visit on 09/07/21   POCT glycosylated hemoglobin (Hb A1C)   Result Value Ref Range    Hemoglobin A1C 8.1 (H) % Completed Orders/Prescriptions   Orders Placed This Encounter   Medications    canagliflozin (INVOKANA) 300 MG TABS tablet     Sig: Take 1 tablet by mouth every morning (before breakfast)     Dispense:  90 tablet     Refill:  0    Semaglutide, 1 MG/DOSE, (OZEMPIC, 1 MG/DOSE,) 4 MG/3ML SOPN     Sig: Inject 1 mg into the skin once a week     Dispense:  3 mL     Refill:  2    insulin glargine (LANTUS SOLOSTAR) 100 UNIT/ML injection pen     Sig: Inject 10 Units into the skin 2 times daily     Dispense:  5 pen     Refill:  0    venlafaxine (EFFEXOR XR) 37.5 MG extended release capsule     Sig: Take 1 capsule by mouth daily     Dispense:  90 capsule     Refill:  0               AssessmentPlan/Medical Decision Making     1. Type 2 diabetes mellitus with diabetic polyneuropathy, with long-term current use of insulin (HCC)  - a1c slightly higher today - reviewed diet  - POCT glycosylated hemoglobin (Hb A1C)  - canagliflozin (INVOKANA) 300 MG TABS tablet; Take 1 tablet by mouth every morning (before breakfast)  Dispense: 90 tablet; Refill: 0  - Semaglutide, 1 MG/DOSE, (OZEMPIC, 1 MG/DOSE,) 4 MG/3ML SOPN; Inject 1 mg into the skin once a week  Dispense: 3 mL; Refill: 2  - insulin glargine (LANTUS SOLOSTAR) 100 UNIT/ML injection pen; Inject 10 Units into the skin 2 times daily  Dispense: 5 pen; Refill: 0    2. Anxiety  - controlled  - venlafaxine (EFFEXOR XR) 37.5 MG extended release capsule; Take 1 capsule by mouth daily  Dispense: 90 capsule; Refill: 0      Return in about 3 months (around 12/7/2021) for Routine follow up of chronic conditions. 1.  Baig received counseling on the following healthy behaviors: nutrition, exercise and medication adherence  2. Patient given educational materials - see patient instructions  3. Was a self-tracking handout given in paper form or via [employer]? No  If yes, see orders or list here. 4.  Discussed use, benefit, and side effects of prescribed medications.   Barriers to medication compliance addressed. All patient questions answered. Pt voiced understanding. 5.  Reviewed prior labs, imaging, consultation, follow up, and health maintenance  6. Continue current medications, diet and exercise. 7. Discussed use, benefit, and side effects of prescribed medications. Barriers to medication compliance addressed. All her questions were answered. Pt voiced understanding. Branden Lopez will continue current medications, diet and exercise. Patient given educational materials on diabetic diet    Of the 25 minute duration appointment visit, Zac Gomes CNP spent greater than 50% of the face-to-face time in counseling, explanation of diagnosis, planning of further management, and answering all questions. Signed:  Zac Gomes CNP    This note is created with the assistance of a speech-recognition program.  While intending to generate a document that actually reflects the content of the visit, no guarantees can be provided that every mistake has been identified and corrected by editing.

## 2021-10-28 DIAGNOSIS — E11.42 DIABETIC POLYNEUROPATHY ASSOCIATED WITH TYPE 2 DIABETES MELLITUS (HCC): ICD-10-CM

## 2021-10-28 RX ORDER — GABAPENTIN 300 MG/1
300 CAPSULE ORAL 3 TIMES DAILY
Qty: 180 CAPSULE | Refills: 2 | Status: SHIPPED | OUTPATIENT
Start: 2021-10-28 | End: 2022-05-10 | Stop reason: SDUPTHER

## 2021-10-28 NOTE — TELEPHONE ENCOUNTER
Sejal Maldonado is calling to request a refill on the following medication(s):    Medication Request:  Requested Prescriptions     Pending Prescriptions Disp Refills    gabapentin (NEURONTIN) 300 MG capsule [Pharmacy Med Name: Gabapentin 300 MG Oral Capsule] 180 capsule 0     Sig: TAKE 1 CAPSULE BY MOUTH THREE TIMES DAILY    apixaban (ELIQUIS) 5 MG TABS tablet 60 tablet 2     Sig: Take 1 tablet by mouth 2 times daily       Last Visit Date (If Applicable):  6/1/1089 In office    Next Visit Date:    10/28/2021

## 2021-10-28 NOTE — TELEPHONE ENCOUNTER
Controlled substances monitoring: no signs of potential drug abuse or diversion identified and [place] report reviewed today- activity consistent with treatment plan.

## 2021-12-10 ENCOUNTER — OFFICE VISIT (OUTPATIENT)
Dept: FAMILY MEDICINE CLINIC | Age: 81
End: 2021-12-10
Payer: MEDICARE

## 2021-12-10 VITALS
BODY MASS INDEX: 19.65 KG/M2 | TEMPERATURE: 97.7 F | OXYGEN SATURATION: 97 % | SYSTOLIC BLOOD PRESSURE: 112 MMHG | WEIGHT: 104 LBS | DIASTOLIC BLOOD PRESSURE: 76 MMHG | HEART RATE: 87 BPM

## 2021-12-10 DIAGNOSIS — E78.00 HYPERCHOLESTEROLEMIA: ICD-10-CM

## 2021-12-10 DIAGNOSIS — Z79.4 TYPE 2 DIABETES MELLITUS WITH DIABETIC POLYNEUROPATHY, WITH LONG-TERM CURRENT USE OF INSULIN (HCC): Primary | ICD-10-CM

## 2021-12-10 DIAGNOSIS — Z23 INFLUENZA VACCINE NEEDED: ICD-10-CM

## 2021-12-10 DIAGNOSIS — E11.42 TYPE 2 DIABETES MELLITUS WITH DIABETIC POLYNEUROPATHY, WITH LONG-TERM CURRENT USE OF INSULIN (HCC): Primary | ICD-10-CM

## 2021-12-10 LAB — HBA1C MFR BLD: 8 %

## 2021-12-10 PROCEDURE — 1090F PRES/ABSN URINE INCON ASSESS: CPT | Performed by: NURSE PRACTITIONER

## 2021-12-10 PROCEDURE — G8400 PT W/DXA NO RESULTS DOC: HCPCS | Performed by: NURSE PRACTITIONER

## 2021-12-10 PROCEDURE — 99214 OFFICE O/P EST MOD 30 MIN: CPT | Performed by: NURSE PRACTITIONER

## 2021-12-10 PROCEDURE — 90694 VACC AIIV4 NO PRSRV 0.5ML IM: CPT | Performed by: NURSE PRACTITIONER

## 2021-12-10 PROCEDURE — 1036F TOBACCO NON-USER: CPT | Performed by: NURSE PRACTITIONER

## 2021-12-10 PROCEDURE — G8420 CALC BMI NORM PARAMETERS: HCPCS | Performed by: NURSE PRACTITIONER

## 2021-12-10 PROCEDURE — 4040F PNEUMOC VAC/ADMIN/RCVD: CPT | Performed by: NURSE PRACTITIONER

## 2021-12-10 PROCEDURE — G8427 DOCREV CUR MEDS BY ELIG CLIN: HCPCS | Performed by: NURSE PRACTITIONER

## 2021-12-10 PROCEDURE — 3052F HG A1C>EQUAL 8.0%<EQUAL 9.0%: CPT | Performed by: NURSE PRACTITIONER

## 2021-12-10 PROCEDURE — 1123F ACP DISCUSS/DSCN MKR DOCD: CPT | Performed by: NURSE PRACTITIONER

## 2021-12-10 PROCEDURE — 83036 HEMOGLOBIN GLYCOSYLATED A1C: CPT | Performed by: NURSE PRACTITIONER

## 2021-12-10 PROCEDURE — G0008 ADMIN INFLUENZA VIRUS VAC: HCPCS | Performed by: NURSE PRACTITIONER

## 2021-12-10 PROCEDURE — G8484 FLU IMMUNIZE NO ADMIN: HCPCS | Performed by: NURSE PRACTITIONER

## 2021-12-10 NOTE — PROGRESS NOTES
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     Family History   Problem Relation Age of Onset    Heart Disease Mother     No Known Problems Father      Social History     Tobacco Use    Smoking status: Former Smoker     Packs/day: 1.00     Years: 27.00     Pack years: 27.00     Types: Cigarettes     Quit date: 10/15/2003     Years since quittin.1    Smokeless tobacco: Never Used   Substance Use Topics    Alcohol use: No     ALLERGIES:    Allergies   Allergen Reactions    Nubain [Nalbuphine Hcl]     Hydroxyzine Other (See Comments), Nausea Only and Nausea And Vomiting    Vistaril [Hydroxyzine Hcl] Nausea And Vomiting          Subjective     · Constitutional:  Negative for activity change, appetite change,unexpected weight change, chills, fever, and fatigue. · HENT: Negative for ear pain, sore throat,  Rhinorrhea, sinus pain, sinus pressure, congestion. · Eyes:  Negative for pain and discharge. · Respiratory:  Negative for chest tightness, shortness of breath, wheezing, and cough. · Cardiovascular:  Negative for chest pain, palpitations and leg swelling. · Gastrointestinal: Negative for abdominal pain, blood in stool, constipation,diarrhea, nausea and vomiting. · Endocrine: Negative for cold intolerance, heat intolerance, polydipsia, polyphagia and polyuria. · Genitourinary: Negative for difficulty urinating, dysuria, flank pain, frequency, hematuria and urgency.    · Musculoskeletal: Negative for arthralgias, back pain, joint swelling, myalgias, neck pain and neck stiffness. · Skin: Negative for rash and wound  · Allergic/Immunologic: Negative for environmental allergies and food allergies. · Neurological:  Negative for dizziness, light-headedness, numbness and headaches. Positive for pain to hands and feet  · Hematological:  Negative for adenopathy. Does not bruise/bleed easily. · Psychiatric/Behavioral: Negative for self-injury, sleep disturbance and suicidal ideas. Objective     PHYSICAL EXAM:   · Constitutional: Claudette Can is oriented to person, place, and time. Vital signs are normal. Appears well-developed and well-nourished. · HEENT:   · Head: Normocephalic and atraumatic. Eyes:PERRL, EOMI, Conjunctiva normal, No discharge. · Neck: Full passive range of motion. Non-tender on palpation. Neck supple. No thyromegaly present. Trachea normal.  · Cardiovascular: Normal rate, regular rhythm, S1, S2, no murmur, no gallop, no friction rub, intact distal pulses. No carotid bruit  · Pulmonary/Chest: Breath sounds are clear throughout, No respiratory distress, No wheezing, No chest tenderness. Effort normal  · Neurological: Alert and oriented to person, place, and time. Normal motor function, Normal sensory function, No focal deficits noted. She has normal strength. · Skin: Skin is warm, dry and intact. No obvious lesions on exposed skin. · Psychiatric: Normal mood and affect. Speech is normal and behavior is normal.     Nursing note and vitals reviewed. Blood pressure 112/76, pulse 87, temperature 97.7 °F (36.5 °C), temperature source Temporal, weight 104 lb (47.2 kg), SpO2 97 %, not currently breastfeeding. Body mass index is 19.65 kg/m².     Wt Readings from Last 3 Encounters:   12/10/21 104 lb (47.2 kg)   09/07/21 107 lb (48.5 kg)   06/03/21 116 lb (52.6 kg)     BP Readings from Last 3 Encounters:   12/10/21 112/76   09/07/21 130/70   06/03/21 124/60       Results for POC orders placed in visit on 12/10/21   POCT glycosylated hemoglobin (Hb A1C)   Result Value Ref Range    Hemoglobin A1C 8.0 %       Completed Orders/Prescriptions   No orders of the defined types were placed in this encounter. Immunizations Administered     Name Date Dose Route    Influenza, Quadv, adjuvanted, 65 yrs +, IM, PF (Fluad) 12/10/2021 0.5 mL Intramuscular    Site: Deltoid- Left    Lot: [id number]    NDC: 56260-020-49          AssessmentPlan/Medical Decision Making     1. Type 2 diabetes mellitus with diabetic polyneuropathy, with long-term current use of insulin (HCC)  - not at goal  - reviewed diet  - continue invokana 300mg daily  - Lantus 10units bid  - Ozempic 1mg weekly  - s.s. as indicated  - POCT glycosylated hemoglobin (Hb A1C)  - CBC With Auto Differential; Future  - Comprehensive Metabolic Panel; Future  - Microalbumin, Ur; Future    2. Hypercholesterolemia  - atorvastatin 10mg nightly  - Lipid, Fasting; Future    3. Influenza vaccine needed  - INFLUENZA, QUADV, ADJUVANTED, 65 YRS =, IM, PF, PREFILL SYR, 0.5ML (FLUAD)      Return in about 3 months (around 3/10/2022) for Routine follow up of chronic conditions. 1.  Safia received counseling on the following healthy behaviors: nutrition, exercise and medication adherence  2. Patient given educational materials - see patient instructions  3. Was a self-tracking handout given in paper form or via [employer]? No  If yes, see orders or list here. 4.  Discussed use, benefit, and side effects of prescribed medications. Barriers to medication compliance addressed. All patient questions answered. Pt voiced understanding. 5.  Reviewed prior labs, imaging, consultation, follow up, and health maintenance  6. Continue current medications, diet and exercise. 7. Discussed use, benefit, and side effects of prescribed medications. Barriers to medication compliance addressed. All her questions were answered. Pt voiced understanding. Shira Martinez will continue current medications, diet and exercise.     Patient given educational materials on diabetic diet    Medical Decision Making: Moderate    Of the 25 minute duration appointment visit, Margo May CNP spent greater than 50% of the face-to-face time in counseling, explanation of diagnosis, planning of further management, and answering all questions. Signed:  Margo May CNP    This note is created with the assistance of a speech-recognition program.  While intending to generate a document that actually reflects the content of the visit, no guarantees can be provided that every mistake has been identified and corrected by editing.

## 2021-12-28 DIAGNOSIS — Z79.4 TYPE 2 DIABETES MELLITUS WITH DIABETIC POLYNEUROPATHY, WITH LONG-TERM CURRENT USE OF INSULIN (HCC): ICD-10-CM

## 2021-12-28 DIAGNOSIS — E11.42 TYPE 2 DIABETES MELLITUS WITH DIABETIC POLYNEUROPATHY, WITH LONG-TERM CURRENT USE OF INSULIN (HCC): ICD-10-CM

## 2021-12-28 RX ORDER — INSULIN GLARGINE 100 [IU]/ML
INJECTION, SOLUTION SUBCUTANEOUS
Qty: 15 ML | Refills: 0 | Status: SHIPPED | OUTPATIENT
Start: 2021-12-28 | End: 2022-03-07

## 2021-12-28 NOTE — TELEPHONE ENCOUNTER
Ingrid Meeks is calling to request a refill on the following medication(s):    Medication Request:  Requested Prescriptions     Pending Prescriptions Disp Refills    LANTUS SOLOSTAR 100 UNIT/ML injection pen [Pharmacy Med Name: Lantus SoloStar 100 UNIT/ML Subcutaneous Solution Pen-injector] 15 mL 0     Sig: INJECT 10 UNITS SUBCUTANEOUSLY TWICE DAILY       Last Visit Date (If Applicable):  58/86/3792    Next Visit Date:    3/11/2022

## 2022-01-13 DIAGNOSIS — Z79.4 TYPE 2 DIABETES MELLITUS WITH DIABETIC POLYNEUROPATHY, WITH LONG-TERM CURRENT USE OF INSULIN (HCC): ICD-10-CM

## 2022-01-13 DIAGNOSIS — E11.42 TYPE 2 DIABETES MELLITUS WITH DIABETIC POLYNEUROPATHY, WITH LONG-TERM CURRENT USE OF INSULIN (HCC): ICD-10-CM

## 2022-01-14 NOTE — TELEPHONE ENCOUNTER
Janna Mora is calling to request a refill on the following medication(s):    Medication Request:  Requested Prescriptions     Pending Prescriptions Disp Refills    OZEMPIC, 1 MG/DOSE, 4 MG/3ML SOPN [Pharmacy Med Name: Ozempic (1 MG/DOSE) 4 MG/3ML Subcutaneous Solution Pen-injector] 3 mL 0     Sig: INJECT 1 MG  SUBCUTANEOUSLY ONCE A WEEK       Last Visit Date (If Applicable):  22/70/9873    Next Visit Date:    3/11/2022

## 2022-01-16 RX ORDER — SEMAGLUTIDE 1.34 MG/ML
INJECTION, SOLUTION SUBCUTANEOUS
Qty: 3 ML | Refills: 0 | Status: SHIPPED | OUTPATIENT
Start: 2022-01-16 | End: 2022-02-14

## 2022-02-11 DIAGNOSIS — Z79.4 TYPE 2 DIABETES MELLITUS WITH DIABETIC POLYNEUROPATHY, WITH LONG-TERM CURRENT USE OF INSULIN (HCC): ICD-10-CM

## 2022-02-11 DIAGNOSIS — E11.42 TYPE 2 DIABETES MELLITUS WITH DIABETIC POLYNEUROPATHY, WITH LONG-TERM CURRENT USE OF INSULIN (HCC): ICD-10-CM

## 2022-02-14 RX ORDER — SEMAGLUTIDE 1.34 MG/ML
INJECTION, SOLUTION SUBCUTANEOUS
Qty: 3 ML | Refills: 0 | Status: SHIPPED | OUTPATIENT
Start: 2022-02-14 | End: 2022-03-10

## 2022-02-25 DIAGNOSIS — E11.42 TYPE 2 DIABETES MELLITUS WITH DIABETIC POLYNEUROPATHY, WITH LONG-TERM CURRENT USE OF INSULIN (HCC): ICD-10-CM

## 2022-02-25 DIAGNOSIS — Z79.4 TYPE 2 DIABETES MELLITUS WITH DIABETIC POLYNEUROPATHY, WITH LONG-TERM CURRENT USE OF INSULIN (HCC): ICD-10-CM

## 2022-02-25 DIAGNOSIS — E78.00 HYPERCHOLESTEROLEMIA: ICD-10-CM

## 2022-02-25 NOTE — TELEPHONE ENCOUNTER
Yasmin Strange is calling to request a refill on the following medication(s):    Medication Request:  Requested Prescriptions     Pending Prescriptions Disp Refills    atorvastatin (LIPITOR) 10 MG tablet [Pharmacy Med Name: Atorvastatin Calcium 10 MG Oral Tablet] 90 tablet 0     Sig: Take 1 tablet by mouth once daily    INVOKANA 300 MG TABS tablet [Pharmacy Med Name: Invokana 300 MG Oral Tablet] 90 tablet 0     Sig: TAKE 1 TABLET BY MOUTH ONCE DAILY IN THE MORNING BEFORE BREAKFAST       Last Visit Date (If Applicable):  70/36/5578 In office    Next Visit Date:    3/23/2022

## 2022-02-28 RX ORDER — APIXABAN 5 MG/1
TABLET, FILM COATED ORAL
Qty: 60 TABLET | Refills: 3 | Status: SHIPPED | OUTPATIENT
Start: 2022-02-28 | End: 2022-04-01 | Stop reason: SDUPTHER

## 2022-02-28 RX ORDER — CANAGLIFLOZIN 300 MG/1
TABLET, FILM COATED ORAL
Qty: 90 TABLET | Refills: 0 | Status: SHIPPED | OUTPATIENT
Start: 2022-02-28 | End: 2022-04-01 | Stop reason: SDUPTHER

## 2022-02-28 RX ORDER — ATORVASTATIN CALCIUM 10 MG/1
TABLET, FILM COATED ORAL
Qty: 90 TABLET | Refills: 0 | Status: SHIPPED | OUTPATIENT
Start: 2022-02-28 | End: 2022-04-01 | Stop reason: SDUPTHER

## 2022-02-28 NOTE — TELEPHONE ENCOUNTER
Yasmin Strange is calling to request a refill on the following medication(s):    Medication Request:  Requested Prescriptions     Pending Prescriptions Disp Refills    ELIQUIS 5 MG TABS tablet [Pharmacy Med Name: Eliquis 5 MG Oral Tablet] 60 tablet 0     Sig: Take 1 tablet by mouth twice daily       Last Visit Date (If Applicable):  16/01/3831    Next Visit Date:    3/23/2022

## 2022-03-06 DIAGNOSIS — Z79.4 TYPE 2 DIABETES MELLITUS WITH DIABETIC POLYNEUROPATHY, WITH LONG-TERM CURRENT USE OF INSULIN (HCC): ICD-10-CM

## 2022-03-06 DIAGNOSIS — E11.42 TYPE 2 DIABETES MELLITUS WITH DIABETIC POLYNEUROPATHY, WITH LONG-TERM CURRENT USE OF INSULIN (HCC): ICD-10-CM

## 2022-03-07 RX ORDER — INSULIN GLARGINE 100 [IU]/ML
INJECTION, SOLUTION SUBCUTANEOUS
Qty: 15 ML | Refills: 0 | Status: SHIPPED | OUTPATIENT
Start: 2022-03-07 | End: 2022-04-01 | Stop reason: SDUPTHER

## 2022-03-07 NOTE — TELEPHONE ENCOUNTER
Amanda Torres is calling to request a refill on the following medication(s):    Medication Request:  Requested Prescriptions     Pending Prescriptions Disp Refills    LANTUS SOLOSTAR 100 UNIT/ML injection pen [Pharmacy Med Name: Lantus SoloStar 100 UNIT/ML Subcutaneous Solution Pen-injector] 15 mL 0     Sig: INJECT 10 UNITS SUBCUTANEOUSLY TWICE DAILY       Last Visit Date (If Applicable):  69/46/5266 In  office    Next Visit Date:    3/16/2022

## 2022-03-09 DIAGNOSIS — Z79.4 TYPE 2 DIABETES MELLITUS WITH DIABETIC POLYNEUROPATHY, WITH LONG-TERM CURRENT USE OF INSULIN (HCC): ICD-10-CM

## 2022-03-09 DIAGNOSIS — E11.42 TYPE 2 DIABETES MELLITUS WITH DIABETIC POLYNEUROPATHY, WITH LONG-TERM CURRENT USE OF INSULIN (HCC): ICD-10-CM

## 2022-03-10 RX ORDER — SEMAGLUTIDE 1.34 MG/ML
INJECTION, SOLUTION SUBCUTANEOUS
Qty: 9 ML | Refills: 0 | Status: SHIPPED | OUTPATIENT
Start: 2022-03-10 | End: 2022-03-14 | Stop reason: SDUPTHER

## 2022-03-10 NOTE — TELEPHONE ENCOUNTER
Abe Davenport is calling to request a refill on the following medication(s):    Medication Request:  Requested Prescriptions     Pending Prescriptions Disp Refills    OZEMPIC, 1 MG/DOSE, 4 MG/3ML SOPN [Pharmacy Med Name: Ozempic (1 MG/DOSE) 4 MG/3ML Subcutaneous Solution Pen-injector] 3 mL 0     Sig: INJECT 1MG  SUBCUTANEOUSLY ONCE A WEEK       Last Visit Date (If Applicable):  99/07/0812 In office    Next Visit Date:    3/30/2022

## 2022-03-11 DIAGNOSIS — E11.42 TYPE 2 DIABETES MELLITUS WITH DIABETIC POLYNEUROPATHY, WITH LONG-TERM CURRENT USE OF INSULIN (HCC): Primary | ICD-10-CM

## 2022-03-11 DIAGNOSIS — Z79.4 TYPE 2 DIABETES MELLITUS WITH DIABETIC POLYNEUROPATHY, WITH LONG-TERM CURRENT USE OF INSULIN (HCC): Primary | ICD-10-CM

## 2022-03-11 NOTE — TELEPHONE ENCOUNTER
Patient is calling stating that it is going to cost over $200 for her to get Ozempic would like to know if she could have samples or change to something else less expensive. Please advise.

## 2022-03-14 NOTE — TELEPHONE ENCOUNTER
Please provide sample. Order placed for pharmacist to assist with patient assistance - will need to get her the paperwork to complete.

## 2022-03-16 RX ORDER — SEMAGLUTIDE 1.34 MG/ML
1 INJECTION, SOLUTION SUBCUTANEOUS WEEKLY
Qty: 3 ML | Refills: 0 | COMMUNITY
Start: 2022-03-16 | End: 2022-04-01 | Stop reason: SDUPTHER

## 2022-03-24 ENCOUNTER — TELEPHONE (OUTPATIENT)
Dept: FAMILY MEDICINE CLINIC | Age: 82
End: 2022-03-24

## 2022-03-24 NOTE — TELEPHONE ENCOUNTER
[id number] Ariadna Batista for [facility] sample if we have any?

## 2022-03-24 NOTE — TELEPHONE ENCOUNTER
----- Message from Stephanie Samuel sent at 3/24/2022  9:10 AM EDT -----  Subject: Message to Provider    QUESTIONS  Information for Provider? pt states that she is on 2 shots and she only   has enough of one shot and the other will run out on Wed the day of her   appt. She is asking for another sample. She says there will not be enough   for the dose that she needs. will  sample at appt.   ---------------------------------------------------------------------------  --------------  CALL BACK INFO  What is the best way for the office to contact you? OK to leave message on   voicemail  Preferred Call Back Phone Number? [phone]  ---------------------------------------------------------------------------  --------------  SCRIPT ANSWERS  Relationship to Patient?  Self

## 2022-03-30 ENCOUNTER — OFFICE VISIT (OUTPATIENT)
Dept: FAMILY MEDICINE CLINIC | Age: 82
End: 2022-03-30
Payer: MEDICARE

## 2022-03-30 VITALS
BODY MASS INDEX: 19.26 KG/M2 | HEART RATE: 78 BPM | DIASTOLIC BLOOD PRESSURE: 78 MMHG | SYSTOLIC BLOOD PRESSURE: 122 MMHG | HEIGHT: 61 IN | OXYGEN SATURATION: 97 % | TEMPERATURE: 97.1 F | WEIGHT: 102 LBS

## 2022-03-30 DIAGNOSIS — F41.9 ANXIETY: ICD-10-CM

## 2022-03-30 DIAGNOSIS — E78.00 HYPERCHOLESTEROLEMIA: ICD-10-CM

## 2022-03-30 DIAGNOSIS — Z79.4 TYPE 2 DIABETES MELLITUS WITH DIABETIC POLYNEUROPATHY, WITH LONG-TERM CURRENT USE OF INSULIN (HCC): Primary | ICD-10-CM

## 2022-03-30 DIAGNOSIS — E11.42 TYPE 2 DIABETES MELLITUS WITH DIABETIC POLYNEUROPATHY, WITH LONG-TERM CURRENT USE OF INSULIN (HCC): ICD-10-CM

## 2022-03-30 DIAGNOSIS — E11.42 TYPE 2 DIABETES MELLITUS WITH DIABETIC POLYNEUROPATHY, WITH LONG-TERM CURRENT USE OF INSULIN (HCC): Primary | ICD-10-CM

## 2022-03-30 DIAGNOSIS — Z79.4 TYPE 2 DIABETES MELLITUS WITH DIABETIC POLYNEUROPATHY, WITH LONG-TERM CURRENT USE OF INSULIN (HCC): ICD-10-CM

## 2022-03-30 LAB — HBA1C MFR BLD: 8.5 %

## 2022-03-30 PROCEDURE — G8420 CALC BMI NORM PARAMETERS: HCPCS | Performed by: NURSE PRACTITIONER

## 2022-03-30 PROCEDURE — 1036F TOBACCO NON-USER: CPT | Performed by: NURSE PRACTITIONER

## 2022-03-30 PROCEDURE — 4040F PNEUMOC VAC/ADMIN/RCVD: CPT | Performed by: NURSE PRACTITIONER

## 2022-03-30 PROCEDURE — G8427 DOCREV CUR MEDS BY ELIG CLIN: HCPCS | Performed by: NURSE PRACTITIONER

## 2022-03-30 PROCEDURE — 99214 OFFICE O/P EST MOD 30 MIN: CPT | Performed by: NURSE PRACTITIONER

## 2022-03-30 PROCEDURE — 83036 HEMOGLOBIN GLYCOSYLATED A1C: CPT | Performed by: NURSE PRACTITIONER

## 2022-03-30 PROCEDURE — G8400 PT W/DXA NO RESULTS DOC: HCPCS | Performed by: NURSE PRACTITIONER

## 2022-03-30 PROCEDURE — 1090F PRES/ABSN URINE INCON ASSESS: CPT | Performed by: NURSE PRACTITIONER

## 2022-03-30 PROCEDURE — 3052F HG A1C>EQUAL 8.0%<EQUAL 9.0%: CPT | Performed by: NURSE PRACTITIONER

## 2022-03-30 PROCEDURE — 1123F ACP DISCUSS/DSCN MKR DOCD: CPT | Performed by: NURSE PRACTITIONER

## 2022-03-30 PROCEDURE — G8484 FLU IMMUNIZE NO ADMIN: HCPCS | Performed by: NURSE PRACTITIONER

## 2022-03-30 ASSESSMENT — PATIENT HEALTH QUESTIONNAIRE - PHQ9
SUM OF ALL RESPONSES TO PHQ9 QUESTIONS 1 & 2: 0
SUM OF ALL RESPONSES TO PHQ QUESTIONS 1-9: 0
1. LITTLE INTEREST OR PLEASURE IN DOING THINGS: 0
SUM OF ALL RESPONSES TO PHQ QUESTIONS 1-9: 0
2. FEELING DOWN, DEPRESSED OR HOPELESS: 0
SUM OF ALL RESPONSES TO PHQ QUESTIONS 1-9: 0
SUM OF ALL RESPONSES TO PHQ QUESTIONS 1-9: 0

## 2022-03-30 NOTE — PROGRESS NOTES
Cecil Maki, [facility]  [facility]  [id number] [facility] [place] [facility]  [facility], [facility]  [phone]  [phone]    Chu Haque is a [de-identified] y.o. female who is here with c/o of:    Chief Complaint: Diabetes      Patient Accompanied by: daughter in law    HPI - Chu Haque is here today for f/u    DM   - she is NOT compliant with diabetic diet and is becoming more physically active   - she denies unintentional weight gain/loss, polyuria, polydipsia   - she has neuropathy of hands and feet and currently taking gabapentin 300mg tid   - current meds: Lantus 10units am, 10units p.m. and denies s/s of hypoglycemia, Invokana 300 mg daily, Ozempic 1 mg weekly and denies any adverse affects (she reports taking only 8 units bid)   - saw podiatry last week and denies any problems   -Currently relying on samples for Ozempic due to cost of medication    Hyperlipidemia   -Current medication atorvastatin 10 mg daily and denies myalgias        Lab Results   Component Value Date    LABA1C 8.5 03/30/2022    LABA1C 8.0 12/10/2021    LABA1C 8.1 (H) 09/07/2021     Lab Results   Component Value Date    LDLCALC 76 12/12/2019    CREATININE 0.38 (L) 03/29/2021         Patient Active Problem List   Diagnosis    COPD (chronic obstructive pulmonary disease) ([address]    Hypertension    Dyslipidemia    Osteoporosis    Osteoarthritis    Type 2 diabetes mellitus with diabetic polyneuropathy ([address]    Duodenal adenoma    Diabetic polyneuropathy associated with type 2 diabetes mellitus ([address]    Malignant neoplasm of duodenum ([address]    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[address]    Diverticular disease    Diarrhea    Gastric outlet obstruction    Hypercholesterolemia        Past Medical History:   Diagnosis Date    DM2 (diabetes mellitus, type 2) ([address]     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    WELL TO MODERATELY DIFFERENTIATED INVASIVE    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    Hyperlipidemia     Hypertension     Osteoarthritis       Past Surgical History:   Procedure Laterality Date    ABDOMEN SURGERY  04/15/2020    Whipple Surgery     BREAST SURGERY      COLONOSCOPY      last one 4 years, pt is unsure of where    [place] EGD REMOVAL TUMOR POLYP/OTHER LESION SNARE TECH N/A 4/3/2017    EGD POLYP SNARE performed by Ezra Hernández MD at [address] [place] [address]  2017    In the 2nd part of the duodenum there is a 3-4 cm mass lesion seen, occupies about half of the lumen-pathology--adenoma    UPPER GASTROINTESTINAL ENDOSCOPY N/A 2020    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     Family History   Problem Relation Age of Onset    Heart Disease Mother     No Known Problems Father      Social History     Tobacco Use    Smoking status: Former Smoker     Packs/day: 1.00     Years: 27.00     Pack years: 27.00     Types: Cigarettes     Quit date: 10/15/2003     Years since quittin.4    Smokeless tobacco: Never Used   Substance Use Topics    Alcohol use: No     ALLERGIES:    Allergies   Allergen Reactions    Nubain [Nalbuphine Hcl]     Hydroxyzine Other (See Comments), Nausea Only and Nausea And Vomiting    Vistaril [Hydroxyzine Hcl] Nausea And Vomiting          Subjective     · Constitutional:  Negative for activity change, appetite change,unexpected weight change, chills, fever, and fatigue. · HENT: Negative for ear pain, sore throat,  Rhinorrhea, sinus pain, sinus pressure, congestion. · Eyes:  Negative for pain and discharge. · Respiratory:  Negative for chest tightness, shortness of breath, wheezing, and cough. · Cardiovascular:  Negative for chest pain, palpitations and leg swelling. · Gastrointestinal: Negative for abdominal pain, blood in stool, constipation,diarrhea, nausea and vomiting. · Endocrine: Negative for cold intolerance, heat intolerance, polydipsia, polyphagia and polyuria.    · Genitourinary: Negative for difficulty urinating, dysuria, flank pain, frequency, hematuria and urgency. · Musculoskeletal: Negative for arthralgias, back pain, joint swelling, myalgias, neck pain and neck stiffness. · Skin: Negative for rash and wound  · Allergic/Immunologic: Negative for environmental allergies and food allergies. · Neurological:  Negative for dizziness, light-headedness, numbness and headaches. Positive for pain to hands and feet  · Hematological:  Negative for adenopathy. Does not bruise/bleed easily. · Psychiatric/Behavioral: Negative for self-injury, sleep disturbance and suicidal ideas. Objective     PHYSICAL EXAM:   · Constitutional: Allie Butler is oriented to person, place, and time. Vital signs are normal. Appears well-developed and well-nourished. · HEENT:   · Head: Normocephalic and atraumatic. Eyes:PERRL, EOMI, Conjunctiva normal, No discharge. · Neck: Full passive range of motion. Non-tender on palpation. Neck supple. No thyromegaly present. Trachea normal.  · Cardiovascular: Normal rate, regular rhythm, S1, S2, no murmur, no gallop, no friction rub, intact distal pulses. No carotid bruit  · Pulmonary/Chest: Breath sounds are clear throughout, No respiratory distress, No wheezing, No chest tenderness. Effort normal  · Neurological: Alert and oriented to person, place, and time. Normal motor function, Normal sensory function, No focal deficits noted. She has normal strength. · Skin: Skin is warm, dry and intact. No obvious lesions on exposed skin. · Psychiatric: Normal mood and affect. Speech is normal and behavior is normal.     Nursing note and vitals reviewed. Blood pressure 122/78, pulse 78, temperature 97.1 °F (36.2 °C), temperature source Temporal, height 5' 1\" (1.549 m), weight 102 lb (46.3 kg), SpO2 97 %, not currently breastfeeding. Body mass index is 19.27 kg/m².     Wt Readings from Last 3 Encounters:   03/30/22 102 lb (46.3 kg)   12/10/21 104 lb (47.2 kg)   09/07/21 107 lb (48.5 kg)     BP Readings from Last 3 Encounters:   03/30/22 122/78   12/10/21 112/76   09/07/21 130/70       Results for POC orders placed in visit on 03/30/22   POCT glycosylated hemoglobin (Hb A1C)   Result Value Ref Range    Hemoglobin A1C 8.5 %       Completed Orders/Prescriptions   Orders Placed This Encounter   Medications    Semaglutide, 1 MG/DOSE, (OZEMPIC, 1 MG/DOSE,) 4 MG/3ML SOPN     Sig: Inject 1 mg into the skin once a week     Dispense:  3 mL     Refill:  0    insulin glargine (LANTUS SOLOSTAR) 100 UNIT/ML injection pen     Sig: Inject 10 Units into the skin 2 times daily     Dispense:  15 mL     Refill:  3    atorvastatin (LIPITOR) 10 MG tablet     Sig: Take 1 tablet by mouth daily     Dispense:  90 tablet     Refill:  3    canagliflozin (INVOKANA) 300 MG TABS tablet     Sig: Take 1 tablet by mouth every morning (before breakfast)     Dispense:  90 tablet     Refill:  0    apixaban (ELIQUIS) 5 MG TABS tablet     Sig: Take 1 tablet by mouth 2 times daily     Dispense:  180 tablet     Refill:  1    venlafaxine (EFFEXOR XR) 37.5 MG extended release capsule     Sig: Take 1 capsule by mouth daily     Dispense:  90 capsule     Refill:  1               AssessmentPlan/Medical Decision Making     1. Type 2 diabetes mellitus with diabetic polyneuropathy, with long-term current use of insulin (HCC)  -Worsening  -Reviewed diet and exercise  -We will work with in-house pharmacist and Darius Ellis for patient assistance with Ozempic  - POCT glycosylated hemoglobin (Hb A1C)  - [facility]) Primary Care Pharmacist  - Semaglutide, 1 MG/DOSE, (OZEMPIC, 1 MG/DOSE,) 4 MG/3ML SOPN; Inject 1 mg into the skin once a week  Dispense: 3 mL; Refill: 0  - insulin glargine (LANTUS SOLOSTAR) 100 UNIT/ML injection pen; Inject 10 Units into the skin 2 times daily  Dispense: 15 mL; Refill: 3  - canagliflozin (INVOKANA) 300 MG TABS tablet; Take 1 tablet by mouth every morning (before breakfast)  Dispense: 90 tablet; Refill: 0    2.  Type 2 diabetes mellitus with diabetic polyneuropathy, with long-term current use of insulin (HCC)  - POCT glycosylated hemoglobin (Hb A1C)  - [facility]) Primary Care Pharmacist  - Semaglutide, 1 MG/DOSE, (OZEMPIC, 1 MG/DOSE,) 4 MG/3ML SOPN; Inject 1 mg into the skin once a week  Dispense: 3 mL; Refill: 0  - insulin glargine (LANTUS SOLOSTAR) 100 UNIT/ML injection pen; Inject 10 Units into the skin 2 times daily  Dispense: 15 mL; Refill: 3  - canagliflozin (INVOKANA) 300 MG TABS tablet; Take 1 tablet by mouth every morning (before breakfast)  Dispense: 90 tablet; Refill: 0    3. Hypercholesterolemia  - atorvastatin (LIPITOR) 10 MG tablet; Take 1 tablet by mouth daily  Dispense: 90 tablet; Refill: 3    4. Anxiety  -Controlled  - venlafaxine (EFFEXOR XR) 37.5 MG extended release capsule; Take 1 capsule by mouth daily  Dispense: 90 capsule; Refill: 1      Return in about 3 months (around 6/30/2022) for Routine follow up of chronic conditions. 1.  Safia received counseling on the following healthy behaviors: nutrition, exercise and medication adherence  2. Patient given educational materials - see patient instructions  3. Was a self-tracking handout given in paper form or via [employer]? No  If yes, see orders or list here. 4.  Discussed use, benefit, and side effects of prescribed medications. Barriers to medication compliance addressed. All patient questions answered. Pt voiced understanding. 5.  Reviewed prior labs, imaging, consultation, follow up, and health maintenance  6. Continue current medications, diet and exercise. 7. Discussed use, benefit, and side effects of prescribed medications. Barriers to medication compliance addressed. All her questions were answered. Pt voiced understanding. Ade Puentes will continue current medications, diet and exercise. Patient given educational materials on diabetic diet    Medical Decision Making:  Moderate    Of the 30 minute duration appointment visit, Kavin Claire CNP spent greater than 50% of the face-to-face time in counseling, explanation of diagnosis, planning of further management, and answering all questions. Signed:  Breanna Lam CNP    This note is created with the assistance of a speech-recognition program.  While intending to generate a document that actually reflects the content of the visit, no guarantees can be provided that every mistake has been identified and corrected by editing.

## 2022-04-01 LAB
ALBUMIN SERPL-MCNC: 4.2 G/DL
ALP BLD-CCNC: 199 U/L
ALT SERPL-CCNC: 67 U/L
ANION GAP SERPL CALCULATED.3IONS-SCNC: NORMAL MMOL/L
AST SERPL-CCNC: 67 U/L
BASOPHILS ABSOLUTE: 0 /ΜL
BASOPHILS RELATIVE PERCENT: 0 %
BILIRUB SERPL-MCNC: 0.4 MG/DL (ref 0.1–1.4)
BUN BLDV-MCNC: 10 MG/DL
CALCIUM SERPL-MCNC: 9.9 MG/DL
CHLORIDE BLD-SCNC: 101 MMOL/L
CHOLESTEROL, FASTING: 150
CO2: 29 MMOL/L
CREAT SERPL-MCNC: 0.5 MG/DL
CREATININE URINE: 50.8 MG/DL
EOSINOPHILS ABSOLUTE: 0.1 /ΜL
EOSINOPHILS RELATIVE PERCENT: 1 %
GFR CALCULATED: 95
GLUCOSE BLD-MCNC: 171 MG/DL
HCT VFR BLD CALC: 42.4 % (ref 36–46)
HDLC SERPL-MCNC: 78 MG/DL (ref 35–70)
HEMOGLOBIN: 13.9 G/DL (ref 12–16)
LDL CHOLESTEROL CALCULATED: 57 MG/DL (ref 0–160)
LYMPHOCYTES ABSOLUTE: 2.4 /ΜL
LYMPHOCYTES RELATIVE PERCENT: 26 %
MCH RBC QN AUTO: 29.6 PG
MCHC RBC AUTO-ENTMCNC: 32.8 G/DL
MCV RBC AUTO: 90 FL
MICROALBUMIN/CREAT 24H UR: 32.3 MG/G{CREAT}
MONOCYTES ABSOLUTE: 0.8 /ΜL
MONOCYTES RELATIVE PERCENT: 8 %
NEUTROPHILS ABSOLUTE: 5.7 /ΜL
NEUTROPHILS RELATIVE PERCENT: 65 %
PDW BLD-RTO: 13.2 %
PLATELET # BLD: 280 K/ΜL
PMV BLD AUTO: NORMAL FL
POTASSIUM SERPL-SCNC: 4.9 MMOL/L
RBC # BLD: 4.69 10^6/ΜL
SODIUM BLD-SCNC: 143 MMOL/L
TOTAL PROTEIN: 7.1
TRIGLYCERIDE, FASTING: 76
WBC # BLD: 9 10^3/ML

## 2022-04-01 RX ORDER — VENLAFAXINE HYDROCHLORIDE 37.5 MG/1
37.5 CAPSULE, EXTENDED RELEASE ORAL DAILY
Qty: 90 CAPSULE | Refills: 1 | Status: SHIPPED | OUTPATIENT
Start: 2022-04-01 | End: 2022-05-13 | Stop reason: SDUPTHER

## 2022-04-01 RX ORDER — CANAGLIFLOZIN 300 MG/1
300 TABLET, FILM COATED ORAL
Qty: 90 TABLET | Refills: 0 | Status: SHIPPED | OUTPATIENT
Start: 2022-04-01 | End: 2022-04-08 | Stop reason: ALTCHOICE

## 2022-04-01 RX ORDER — SEMAGLUTIDE 1.34 MG/ML
1 INJECTION, SOLUTION SUBCUTANEOUS WEEKLY
Qty: 3 ML | Refills: 0 | Status: SHIPPED | OUTPATIENT
Start: 2022-04-01 | End: 2022-10-21

## 2022-04-01 RX ORDER — INSULIN GLARGINE 100 [IU]/ML
10 INJECTION, SOLUTION SUBCUTANEOUS 2 TIMES DAILY
Qty: 15 ML | Refills: 3 | Status: SHIPPED | OUTPATIENT
Start: 2022-04-01 | End: 2022-04-08 | Stop reason: ALTCHOICE

## 2022-04-01 RX ORDER — ATORVASTATIN CALCIUM 10 MG/1
10 TABLET, FILM COATED ORAL DAILY
Qty: 90 TABLET | Refills: 3 | Status: SHIPPED | OUTPATIENT
Start: 2022-04-01 | End: 2022-05-13 | Stop reason: SDUPTHER

## 2022-04-05 ENCOUNTER — TELEPHONE (OUTPATIENT)
Dept: FAMILY MEDICINE CLINIC | Age: 82
End: 2022-04-05

## 2022-04-05 NOTE — TELEPHONE ENCOUNTER
----- Message from [address] sent at 4/5/2022  2:34 PM EDT -----  Subject: Message to Provider    QUESTIONS  Information for Provider? Patient was seen by Nikolas Silva on 3/30 and [facility] Staff was going to look into finding patient some assistance with her   medication since patient can't afford it due to high cost. Information was   to be relayed to daughter in law, Rachael Vargas at [phone]. Patient   hasn't heard anything and would like to know where this stands. Please   contact Casi([email or web address]) with update  ---------------------------------------------------------------------------  --------------  CALL BACK INFO  What is the best way for the office to contact you? OK to leave message on   voicemail  Preferred Call Back Phone Number? [phone]  ---------------------------------------------------------------------------  --------------  SCRIPT ANSWERS  Relationship to Patient?  Self

## 2022-04-06 ENCOUNTER — TELEPHONE (OUTPATIENT)
Dept: FAMILY MEDICINE CLINIC | Age: 82
End: 2022-04-06

## 2022-04-06 NOTE — TELEPHONE ENCOUNTER
Medication Management Service ([address] [place] [address])  [facility]  [phone]     CLINICAL PHARMACY NOTE:      Writer has spoken with both patient and patient's daughter-in-law, [facility]. Patient has reached the coverage gap with her Medicare plan and her prescriptions have become unaffordable. The following medications are too expensive for patient to continue to purchase. (Ozempic, Lantus, Invokana and Novolog). Patient would be considered a 2 person household as she lives with her . Totally household income would be approximately $52,800. Several of the patient assistance programs will consider income eligibility as being less than 400% of the Federal Poverty Level. For a 2 person household this would be less than $73,240. Consideration should be given to the [facility] patient assistance program as both Ozempic and Novolog is included on the list of eligible medications. In addition, Connee Merle would be also on the list.  Could consider switching Lantus to Connee Merle. [employer]-Patient Assistance Connection-Lantus    Appears patient would meet eligibility requirements for [employer]. Eligibility criteria for Invokana, through the [employer] Patient [address]arly[address], shows that an outreach to the company would be needed to confirm eligibility. Phone call to [facility] with .  Patient will probably need to meet the following eligibility criteria to be approved:    · Some patients with Medicare Prescription Drug Coverage (Part D) who cannot afford their medicines and who meet certain financial criteria may also be eligible for assistance  · A report from your pharmacy or an Explanation of Benefits (EOB) statement from your insurer that shows your out-of-pocket costs for the current year can be requested and may be submitted with your application.  In order to qualify for the program, you must spend 4% or more of your gross annual  · Looking at patient's estimated income this may be approximately $2115. Other option for SGLT2 would include [employer]. Appears that patient would qualify through [address] through [employer]-No specific number listed for what the annual income per year will need to be below to meet eligibility requirements. Followed up with PCP on 04/08/2022. Plan will be to apply for both Ozempic and [place] through [employer]. Medication list updated by PCP. Will need to schedule an appointment for patient to bring financial documents and sign applications. Mike Mendoza, Pharm. D., [address] Medication Management Service  [phone]  4/6/2022  5:41 PM    ===================================================================  For Pharmacy Admin Tracking Only     CPA in place:  No   Recommendation Provided To: Patient/Caregiver: 1 via Telephone   Intervention Detail: Patient Access Assistance/Sample Provided   Intervention Accepted By: Patient/Caregiver: 1   Time Spent (min): 60

## 2022-04-08 DIAGNOSIS — E11.42 TYPE 2 DIABETES MELLITUS WITH DIABETIC POLYNEUROPATHY, WITH LONG-TERM CURRENT USE OF INSULIN (HCC): Primary | ICD-10-CM

## 2022-04-08 DIAGNOSIS — Z79.4 TYPE 2 DIABETES MELLITUS WITH DIABETIC POLYNEUROPATHY, WITH LONG-TERM CURRENT USE OF INSULIN (HCC): Primary | ICD-10-CM

## 2022-04-08 NOTE — PROGRESS NOTES
Collaboration with pharmacist for medications-patient assistance    Medication changes: Discontinue Lantus-start insulin degludec 10 units twice daily  Discontinue Invokana-due to financial-we will increase insulin as needed  Continue Ozempic 1 mg weekly

## 2022-04-11 DIAGNOSIS — E78.00 HYPERCHOLESTEROLEMIA: ICD-10-CM

## 2022-04-11 DIAGNOSIS — E11.42 TYPE 2 DIABETES MELLITUS WITH DIABETIC POLYNEUROPATHY, WITH LONG-TERM CURRENT USE OF INSULIN (HCC): ICD-10-CM

## 2022-04-11 DIAGNOSIS — Z79.4 TYPE 2 DIABETES MELLITUS WITH DIABETIC POLYNEUROPATHY, WITH LONG-TERM CURRENT USE OF INSULIN (HCC): ICD-10-CM

## 2022-04-18 ENCOUNTER — OFFICE VISIT (OUTPATIENT)
Dept: FAMILY MEDICINE CLINIC | Age: 82
End: 2022-04-18

## 2022-04-18 DIAGNOSIS — Z79.4 TYPE 2 DIABETES MELLITUS WITH DIABETIC POLYNEUROPATHY, WITH LONG-TERM CURRENT USE OF INSULIN (HCC): ICD-10-CM

## 2022-04-18 DIAGNOSIS — E11.42 TYPE 2 DIABETES MELLITUS WITH DIABETIC POLYNEUROPATHY, WITH LONG-TERM CURRENT USE OF INSULIN (HCC): ICD-10-CM

## 2022-04-18 PROCEDURE — APPNB60 APP NON BILLABLE TIME 46-60 MINS: Performed by: PHARMACIST

## 2022-04-18 PROCEDURE — 99999 PR OFFICE/OUTPT VISIT,PROCEDURE ONLY: CPT | Performed by: PHARMACIST

## 2022-04-18 NOTE — PROGRESS NOTES
Medication Management Service  Jessica Saldaña is a [de-identified] y.o. female that presents for a follow-up diabetes mellitus office visit with Medication Management Service per referral from PA Auguste CNP for patient assistance form completion     Subjective/Objective     New Problems/Changes: Patient presents to sign [employer] patient assistance application and provide supporting income/tax documentation. DIABETES MANAGEMENT    Diabetes Goals: Using ADA Standards of Care   Fasting Blood Sugars:  80-130mg/dL  Postprandial glucose:  Less than 180mg/dL     Lab Results   Component Value Date    LABA1C 8.5 03/30/2022    LABA1C 8.0 12/10/2021    LABA1C 8.1 (H) 09/07/2021     Renal monitoring:  No results found for: Maricel Dolores  Lab Results   Component Value Date    CREATININE 0.50 04/01/2022     CrCl cannot be calculated (Unknown ideal weight.). MEDICATIONS    Current Outpatient Medications   Medication Sig Dispense Refill    Insulin Degludec 200 UNIT/ML SOPN Inject 10 Units into the skin 2 times daily 9 mL 0    Semaglutide, 1 MG/DOSE, (OZEMPIC, 1 MG/DOSE,) 4 MG/3ML SOPN Inject 1 mg into the skin once a week 3 mL 0    atorvastatin (LIPITOR) 10 MG tablet Take 1 tablet by mouth daily 90 tablet 3    apixaban (ELIQUIS) 5 MG TABS tablet Take 1 tablet by mouth 2 times daily 180 tablet 1    venlafaxine (EFFEXOR XR) 37.5 MG extended release capsule Take 1 capsule by mouth daily 90 capsule 1    insulin aspart (NOVOLOG FLEXPEN) 100 UNIT/ML injection pen TIDbefore meals 141-180 6units,181-220 8units,221-260 10units,261-300 12units,301-350 14units,351-400 16 units,>400 18units [id number] daily 5 pen 3    gabapentin (NEURONTIN) 300 MG capsule Take 1 capsule by mouth 3 times daily for 90 days.  180 capsule 2    TRUE METRIX BLOOD GLUCOSE TEST strip USE 1 STRIP TO CHECK GLUCOSE THREE TIMES DAILY 150 each 5    blood glucose monitor kit and supplies Dispense sufficient amount for indicated testing frequency plus additional to accommodate PRN testing needs. Dispense all needed supplies to include: monitor, strips, lancing device, lancets, control solutions, alcohol swabs. 1 kit 3    blood glucose monitor kit and supplies Dispense sufficient amount for indicated testing frequency plus additional to accommodate PRN testing needs. Dispense all needed supplies to include: monitor, strips, lancing device, lancets, control solutions, alcohol swabs. 1 kit 0    Insulin Pen Needle 32G X 4 MM MISC 1 each by Does not apply route daily 100 each 3     No current facility-administered medications for this visit. On Statin: Yes    On ACE-I or ARB for HTN or Microalbuminuria: No    Medication Adherence/Cost/Adverse Events:    Utilizes samples of Ozempic due to cost barrier  o Daughter-in-law administers dose on Wednesdays   o Picked up a sample of Ozempic roughly one week ago    Assessment/Plan     Diabetes Management:   · Patient signed [employer] Patient Assistance Application today for [address] [facility] and [place]  · Will discuss with Shahab Dominguez PharmD and/or PA Rdz CNP if patient is to continue utilizing sliding scale Novolog and it is to be included on the patient assistance application  · Patient presented pension letter from [employer] dated 12/21/2020. Will discuss with Shahab Dominguez PharmD if patient will require to present updated pension letter. · Patient has been obtaining Ozempic samples at the [facility] office.    · Patient will require additional Ozempic sample while waiting for approval from [employer] for patient assistance  · [facility] is currently out of Ozempic samples    The following education was provided during today's visit:     [x] Medication adherence   [x] Insulin technique and storage   [] Healthy lifestyle including diet and exercise changes   [] Hypoglycemia symptoms and management   [] Blood glucose goals    [] Introduction to FreeStyle Destini 2 and continuing glucose monitoring   [] Interpreting Ambulatory Glucose Profile (AGP) Report with focus on page 1, average number of scans and glucose levels per day, and snapshot     Next Follow-Up: Virtual Visit on 2022 @ 2:00 pm  · Patient will look for most recent pension letter from [employer]  · Update patient on status of Ozempic samples available at the [facility] office    Patient verbalized understanding of care plan. Patient advised to call Medication Management with any questions, concerns, or changes prior to next appointment. Progress note sent to referring provider. Mu Kong, APRN - CNP)   Patient acknowledges working in a consult agreement with the pharmacist as referred by his/her physician.      Tamra Hinojosa, PharmD  PGY2 Ambulatory Care Pharmacy Resident    2022 4:39 PM      ==================================================================    For Pharmacy Admin Tracking Only     CPA in place:  No   Recommendation Provided To: Patient/Caregiver: 4 via In person   Intervention Detail: Adherence Monitorin, Patient Access Assistance/Sample Provided and Scheduled Appointment   Gap Closed?: No    Intervention Accepted By: Patient/Caregiver: 4   Time Spent (min): 60

## 2022-04-21 ENCOUNTER — TELEPHONE (OUTPATIENT)
Dept: FAMILY MEDICINE CLINIC | Age: 82
End: 2022-04-21

## 2022-04-21 NOTE — TELEPHONE ENCOUNTER
----- Message from [facility] sent at 4/21/2022  9:31 AM EDT -----  Subject: Message to Provider    QUESTIONS  Information for Provider? Patient would like to come today to  a   sample of Ozempic. She only has 1/2 a dose left and she needs to take it   this Wednesday. Call patient back about the sample for Ozempic.   ---------------------------------------------------------------------------  --------------  CALL BACK INFO  What is the best way for the office to contact you? OK to leave message on   voicemail  Preferred Call Back Phone Number? [phone]  ---------------------------------------------------------------------------  --------------  SCRIPT ANSWERS  Relationship to Patient?  Self

## 2022-04-21 NOTE — TELEPHONE ENCOUNTER
Patient advised we are out of Jim Gutiérrez, will call her back Wednesday if we havent gotten any by then

## 2022-04-27 ENCOUNTER — TELEMEDICINE (OUTPATIENT)
Dept: FAMILY MEDICINE CLINIC | Age: 82
End: 2022-04-27

## 2022-04-27 DIAGNOSIS — E11.42 TYPE 2 DIABETES MELLITUS WITH DIABETIC POLYNEUROPATHY, WITH LONG-TERM CURRENT USE OF INSULIN (HCC): ICD-10-CM

## 2022-04-27 DIAGNOSIS — Z79.4 TYPE 2 DIABETES MELLITUS WITH DIABETIC POLYNEUROPATHY, WITH LONG-TERM CURRENT USE OF INSULIN (HCC): ICD-10-CM

## 2022-04-27 PROCEDURE — 99999 PR OFFICE/OUTPT VISIT,PROCEDURE ONLY: CPT | Performed by: PHARMACIST

## 2022-04-27 NOTE — PROGRESS NOTES
Medication Management Service ([facility])  [phone]     CLINICAL PHARMACY NOTE:  Telephone    Writer returned patient's phone call regarding patient assistance application for Ozempic and samples for Ozempic. Patient provided pension letter dated December 2020 at appointment on 04/18/2022. Writer informed patient that Ozempic patient assistance application will require the most updated income document. Patient reports she has located her pension letter dated December 2021. Patient or her daughter in law, Nolvia Tsang, will present letter to [address] office tomorrow, 04/28/2022, for office staff to make a photocopy of to accompany Ozempic patient assistance application. Additionally, patient requires office sample of Ozempic in the meantime. Per Viraj Miller PharmD, Ozempic representative is not working this week and the [address] office is currently out of samples. Patient will have to wait to administer dose until samples are provided. Will route note to Viraj Miller PharmD, to ensure continuity of care. Riya Weber PharmD  PGY2 Ambulatory Care Pharmacy Resident    4/27/2022 2:08 PM    ==========================================================    For Pharmacy Admin Tracking Only     CPA in place:   Yes   Recommendation Provided To: Patient/Caregiver: 2 via Telephone   Intervention Detail: Patient Access Assistance/Sample Provided   Gap Closed?: No    Intervention Accepted By: Patient/Caregiver: 2   Time Spent (min): 20

## 2022-04-27 NOTE — TELEPHONE ENCOUNTER
Patient called the office and was advised that we still do not have Ozempic in office. Pt is asking when we do can we please hold one for her and to give her a phone call please.  Thank you

## 2022-04-28 ENCOUNTER — TELEPHONE (OUTPATIENT)
Dept: FAMILY MEDICINE CLINIC | Age: 82
End: 2022-04-28

## 2022-04-28 NOTE — TELEPHONE ENCOUNTER
Medication Management Service ([address] [place] [address])  [facility]  [phone]     CLINICAL PHARMACY NOTE:      [address] [place] [address] Patient Assistance application completed for Ozempic 1mg, [place], and pen needles as patient's updated financial documents were dropped off to the office. Completed packet then faxed, confirmation received, and uploaded into Epic. Tony Leslie, Pharm. D., [address] Medication Management Service  [phone]  4/28/2022  6:07 PM    ==========================================================  For Pharmacy Admin Tracking Only     CPA in place:  No   Intervention Detail: Patient Access Assistance/Sample Provided   Time Spent (min): 30

## 2022-05-03 ENCOUNTER — TELEPHONE (OUTPATIENT)
Dept: FAMILY MEDICINE CLINIC | Age: 82
End: 2022-05-03

## 2022-05-03 NOTE — TELEPHONE ENCOUNTER
Medication Management Service ([address] [place] [address])  [facility]  [phone]     CLINICAL PHARMACY NOTE:      Spoke with patient by telephone. Patient requesting Ozempic sample as she has been without medication for approximately 10 days. Followed up with office no samples available. Unclear if/when office will receive more. Patient recently applied to [employer] Patient Assistance program.  Follow up call to [facility] [employer]. Patient has been approved for Ozempic but more information was needed for the [place]/Pen Malcolm. Spoke with Juliann Mendenhall who will follow up with PCP PA Chen CNP) for needed signatures. Anticipating that patient should be approved for both [place] and pen needles. Additional phone call to patient-above shared. Patient was unaware of the plan to apply for [place]. Brief discussion on how she may be able to take [place] daily. Will need to follow up with PCP. Recommendation is to initiate insulin degludec at 80% of the total daily long-or immediate acting insulin from which the insulin is being converted (LexiComp 2022). For this patient initial starting dose would be 16 units per day. Monitor blood glucose and titrate every 3-4 days if needed. Julissa Irsael, Pharm. D., [address] [facility] Medication Management Service  [phone]  5/3/2022  1:31 PM    ============================================================  For Pharmacy Admin Tracking Only     CPA in place:  No   Recommendation Provided To: Patient/Caregiver: 1 via Telephone   Intervention Detail: Patient Access Assistance/Sample Provided   Intervention Accepted By: Patient/Caregiver: 1   Time Spent (min): 60

## 2022-05-10 DIAGNOSIS — E11.42 DIABETIC POLYNEUROPATHY ASSOCIATED WITH TYPE 2 DIABETES MELLITUS (HCC): ICD-10-CM

## 2022-05-10 RX ORDER — GABAPENTIN 300 MG/1
300 CAPSULE ORAL 3 TIMES DAILY
Qty: 180 CAPSULE | Refills: 2 | Status: SHIPPED | OUTPATIENT
Start: 2022-05-10 | End: 2022-07-01 | Stop reason: SDUPTHER

## 2022-05-10 NOTE — TELEPHONE ENCOUNTER
Aryan Clark is calling to request a refill on the following medication(s):    Medication Request:  Requested Prescriptions     Pending Prescriptions Disp Refills    gabapentin (NEURONTIN) 300 MG capsule 180 capsule 2     Sig: Take 1 capsule by mouth 3 times daily for 90 days.        Last Visit Date (If Applicable):  2/94/7779 In office    Next Visit Date:    7/1/2022

## 2022-05-12 DIAGNOSIS — Z79.4 TYPE 2 DIABETES MELLITUS WITH DIABETIC POLYNEUROPATHY, WITH LONG-TERM CURRENT USE OF INSULIN (HCC): ICD-10-CM

## 2022-05-12 DIAGNOSIS — F41.9 ANXIETY: ICD-10-CM

## 2022-05-12 DIAGNOSIS — E11.42 TYPE 2 DIABETES MELLITUS WITH DIABETIC POLYNEUROPATHY, WITH LONG-TERM CURRENT USE OF INSULIN (HCC): ICD-10-CM

## 2022-05-12 DIAGNOSIS — E78.00 HYPERCHOLESTEROLEMIA: ICD-10-CM

## 2022-05-12 NOTE — TELEPHONE ENCOUNTER
Last visit: 3/30/2022  Last Med refill:   Does patient have enough medication for 72 hours:     Next Visit Date:7/1/2022  Future Appointments   Date Time Provider Jessica Ashraf   7/1/2022  2:40 PM PA Kellogg CNP [facility] [id number] [employer] Maintenance   Topic Date Due    COVID-19 Vaccine (1) Never done    Annual Wellness Visit (AWV)  05/04/2022    Pneumococcal 65+ years Vaccine (2 - PPSV23 or PCV20) 06/14/2022 (Originally 10/27/2016)    DEXA (modify frequency per FRAX score)  09/07/2022 (Originally 4/21/1996)    DTaP/Tdap/Td vaccine (1 - Tdap) 09/07/2022 (Originally 4[birth year]21/1960)    Shingles vaccine (1 of 2) 09/07/2022 (Originally 4/21/1991)    Depression Screen  03/30/2023    Lipids  04/01/2023    Flu vaccine  Completed    Hepatitis A vaccine  Aged Out    Hib vaccine  Aged Out    Meningococcal (ACWY) vaccine  Aged Out       Hemoglobin A1C (%)   Date Value   03/30/2022 8.5   12/10/2021 8.0   09/07/2021 8.1 (H)             ( goal A1C is < 7)   No results found for: LABMICR  LDL Cholesterol (mg/dL)   Date Value   10/20/2020 46   06/14/2017 107     LDL Calculated (mg/dL)   Date Value   04/01/2022 57   12/12/2019 76       (goal LDL is <100)   AST (U/L)   Date Value   04/01/2022 67     ALT (U/L)   Date Value   04/01/2022 67     BUN (mg/dL)   Date Value   04/01/2022 10     BP Readings from Last 3 Encounters:   03/30/22 122/78   12/10/21 112/76   09/07/21 130/70          (goal 120/80)    All Future Testing planned in [facility]  Lab Frequency Next Occurrence               Patient Active Problem List:     COPD (chronic obstructive pulmonary disease) ([facility]     Hypertension     Dyslipidemia     Osteoporosis     Osteoarthritis     Type 2 diabetes mellitus with diabetic polyneuropathy (HCC)     Duodenal adenoma     Diabetic polyneuropathy associated with type 2 diabetes mellitus ([facility]     Malignant neoplasm of duodenum (HCC)     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[facility]     Diverticular disease     Diarrhea     Gastric outlet obstruction     Hypercholesterolemia

## 2022-05-13 RX ORDER — VENLAFAXINE HYDROCHLORIDE 37.5 MG/1
37.5 CAPSULE, EXTENDED RELEASE ORAL DAILY
Qty: 90 CAPSULE | Refills: 1 | Status: SHIPPED | OUTPATIENT
Start: 2022-05-13

## 2022-05-13 RX ORDER — ATORVASTATIN CALCIUM 10 MG/1
10 TABLET, FILM COATED ORAL DAILY
Qty: 90 TABLET | Refills: 3 | Status: SHIPPED | OUTPATIENT
Start: 2022-05-13 | End: 2022-08-30 | Stop reason: SDUPTHER

## 2022-05-13 RX ORDER — CALCIUM CITRATE/VITAMIN D3 200MG-6.25
TABLET ORAL
Qty: 150 EACH | Refills: 5 | Status: SHIPPED | OUTPATIENT
Start: 2022-05-13 | End: 2022-05-20 | Stop reason: SDUPTHER

## 2022-05-17 ENCOUNTER — TELEPHONE (OUTPATIENT)
Dept: FAMILY MEDICINE CLINIC | Age: 82
End: 2022-05-17

## 2022-05-17 NOTE — TELEPHONE ENCOUNTER
Medication Management Service ([address] [place] [address])  [facility]  [phone]     CLINICAL PHARMACY NOTE:    Follow up regarding the status of patient assistance application through [facility] [employer] Patient Assistance for Rosie Walters with pen needles. Phone call to [facility] [employer]. Patient approved for all of the above through 11/30/2022. · Ozempic 1mg has been shipped and received by office today (05/17/2022). · Camille Freed and Pen [place] are being processed and can be expected to be received in the office by the end of next week (Week of 5/23/2022). Per previous phone call, patient was unaware of the plan to apply for Camille Freed. Brief discussion on how she may be able to take Camille Freed daily. Recommendation is to initiate insulin degludec at 80% of the total daily long-or immediate acting insulin from which the insulin is being converted (LexiComp 2022). For this patient initial starting dose would be 16 units per day as patient had been taking insulin glargine 10 units twice daily. Monitor blood glucose and titrate every 3-4 days if needed. Progress note routed to [facility] - [facility])    Omer Decker, Pharm. D., [address] Medication Management Service  [phone]  5/17/2022  4:23 PM    ============================================================  For Pharmacy Admin Tracking Only     CPA in place:  No    Intervention Detail: Patient Access Assistance/Sample Provided   Time Spent (min): 30

## 2022-05-20 DIAGNOSIS — Z79.4 TYPE 2 DIABETES MELLITUS WITH DIABETIC POLYNEUROPATHY, WITH LONG-TERM CURRENT USE OF INSULIN (HCC): ICD-10-CM

## 2022-05-20 DIAGNOSIS — E11.42 TYPE 2 DIABETES MELLITUS WITH DIABETIC POLYNEUROPATHY, WITH LONG-TERM CURRENT USE OF INSULIN (HCC): ICD-10-CM

## 2022-05-20 RX ORDER — CALCIUM CITRATE/VITAMIN D3 200MG-6.25
TABLET ORAL
Qty: 150 EACH | Refills: 5 | Status: SHIPPED | OUTPATIENT
Start: 2022-05-20 | End: 2022-09-27 | Stop reason: SDUPTHER

## 2022-05-20 NOTE — TELEPHONE ENCOUNTER
----- Message from Jarad Siu sent at 5/20/2022  3:02 PM EDT -----  Subject: Message to Provider    QUESTIONS  Information for Provider? Pt states that she has been waiting on her test   strips to be delivered From [facility]. Pt sates that she already   talked to someone about this at the practice but since then [facility] [place] [facility] can not   track the package and it may possibly be delivered on the 05/25. Pt will   run out of test strips before the script can arrive. Pt needs and partial   prescription to last or an new script sent over to [facility]. Pt has   enough to last the weekend but will need strips by Monday. [facility] is   the only pharmacy she wou  ---------------------------------------------------------------------------  --------------  [address]  What is the best way for the office to contact you? Do not leave any   message, patient will call back for answer  Preferred Call Back Phone Number? [phone]  ---------------------------------------------------------------------------  --------------  SCRIPT ANSWERS  Relationship to Patient?  Self

## 2022-05-25 ENCOUNTER — TELEPHONE (OUTPATIENT)
Dept: FAMILY MEDICINE CLINIC | Age: 82
End: 2022-05-25

## 2022-05-25 NOTE — TELEPHONE ENCOUNTER
Patient called requesting sample of Ozempic. States that she just used her last pen and will need another one by next Wednesday.

## 2022-05-26 ENCOUNTER — TELEPHONE (OUTPATIENT)
Dept: FAMILY MEDICINE CLINIC | Age: 82
End: 2022-05-26

## 2022-05-26 NOTE — TELEPHONE ENCOUNTER
----- Message from Stew Alcazar sent at 5/26/2022  3:03 PM EDT -----  Subject: Message to Provider    QUESTIONS  Information for Provider? Safia received a message from the office that her   Ozempic is at the office and ready to be picked up. Juan Dalal also was asking   about a insulin pen. She wanted to know if it was at the office as well so   her daughter in law can pick them both up at the same time. Please reach   out to let you know.  ---------------------------------------------------------------------------  --------------  CALL BACK INFO  What is the best way for the office to contact you? OK to leave message on   voicemail  Preferred Call Back Phone Number? [phone]  ---------------------------------------------------------------------------  --------------  SCRIPT ANSWERS  Relationship to Patient?  Self

## 2022-05-26 NOTE — TELEPHONE ENCOUNTER
Patient advised Jim Gutiérrez is ready but per Fely Kim we have not gotten other insulin pens in advised once we get those in we will call her

## 2022-05-26 NOTE — TELEPHONE ENCOUNTER
Her Ozempic from patient assistance was received on 5/17 here in the office per Margarette's note - please call patient to come and p/u

## 2022-06-02 ENCOUNTER — TELEPHONE (OUTPATIENT)
Dept: FAMILY MEDICINE CLINIC | Age: 82
End: 2022-06-02

## 2022-06-02 NOTE — TELEPHONE ENCOUNTER
Medication Management Service ([address] [place] [address])  [facility]  [phone]     CLINICAL PHARMACY NOTE:      Spoke with Ata Gibson MA regarding [place] and pen needles shipment from [facility] [employer] patient assistance program.  Both were anticipated to be received by office the week of 05/23/2022. No package received as of today, 06/02/2022. Phone call to [facility] [employer]. Both [place] and pen needles were delivered and signed by office on Friday, [email or web address].  Above information will be shared with MA. Chun Conde, Pharm. D., [address] Medication Management Service  [phone]  6/2/2022  7:03 PM    ====================================================  For Pharmacy [address] in place:  No   Recommendation Provided To:  Other: 1   Intervention Detail: Patient Access Assistance/Sample Provided   Intervention Accepted By: Other: 1   Time Spent (min): 45

## 2022-06-30 DIAGNOSIS — E11.42 DIABETIC POLYNEUROPATHY ASSOCIATED WITH TYPE 2 DIABETES MELLITUS (HCC): ICD-10-CM

## 2022-07-01 RX ORDER — GABAPENTIN 300 MG/1
300 CAPSULE ORAL 3 TIMES DAILY
Qty: 180 CAPSULE | Refills: 0 | Status: SHIPPED | OUTPATIENT
Start: 2022-07-01 | End: 2022-08-22

## 2022-07-08 ENCOUNTER — OFFICE VISIT (OUTPATIENT)
Dept: FAMILY MEDICINE CLINIC | Age: 82
End: 2022-07-08
Payer: MEDICARE

## 2022-07-08 VITALS
SYSTOLIC BLOOD PRESSURE: 128 MMHG | DIASTOLIC BLOOD PRESSURE: 63 MMHG | OXYGEN SATURATION: 96 % | TEMPERATURE: 96.9 F | BODY MASS INDEX: 20.6 KG/M2 | WEIGHT: 109 LBS | HEART RATE: 107 BPM

## 2022-07-08 DIAGNOSIS — E11.42 TYPE 2 DIABETES MELLITUS WITH DIABETIC POLYNEUROPATHY, WITH LONG-TERM CURRENT USE OF INSULIN (HCC): Primary | ICD-10-CM

## 2022-07-08 DIAGNOSIS — E11.42 DIABETIC POLYNEUROPATHY ASSOCIATED WITH TYPE 2 DIABETES MELLITUS (HCC): ICD-10-CM

## 2022-07-08 DIAGNOSIS — Z79.4 TYPE 2 DIABETES MELLITUS WITH DIABETIC POLYNEUROPATHY, WITH LONG-TERM CURRENT USE OF INSULIN (HCC): Primary | ICD-10-CM

## 2022-07-08 DIAGNOSIS — E78.00 HYPERCHOLESTEROLEMIA: ICD-10-CM

## 2022-07-08 DIAGNOSIS — F41.9 ANXIETY: ICD-10-CM

## 2022-07-08 LAB — HBA1C MFR BLD: 8.8 %

## 2022-07-08 PROCEDURE — 3052F HG A1C>EQUAL 8.0%<EQUAL 9.0%: CPT | Performed by: NURSE PRACTITIONER

## 2022-07-08 PROCEDURE — G8400 PT W/DXA NO RESULTS DOC: HCPCS | Performed by: NURSE PRACTITIONER

## 2022-07-08 PROCEDURE — 1036F TOBACCO NON-USER: CPT | Performed by: NURSE PRACTITIONER

## 2022-07-08 PROCEDURE — G8420 CALC BMI NORM PARAMETERS: HCPCS | Performed by: NURSE PRACTITIONER

## 2022-07-08 PROCEDURE — 99214 OFFICE O/P EST MOD 30 MIN: CPT | Performed by: NURSE PRACTITIONER

## 2022-07-08 PROCEDURE — 1090F PRES/ABSN URINE INCON ASSESS: CPT | Performed by: NURSE PRACTITIONER

## 2022-07-08 PROCEDURE — G8427 DOCREV CUR MEDS BY ELIG CLIN: HCPCS | Performed by: NURSE PRACTITIONER

## 2022-07-08 PROCEDURE — 1123F ACP DISCUSS/DSCN MKR DOCD: CPT | Performed by: NURSE PRACTITIONER

## 2022-07-08 PROCEDURE — 83036 HEMOGLOBIN GLYCOSYLATED A1C: CPT | Performed by: NURSE PRACTITIONER

## 2022-07-08 SDOH — ECONOMIC STABILITY: FOOD INSECURITY: WITHIN THE PAST 12 MONTHS, THE FOOD YOU BOUGHT JUST DIDN'T LAST AND YOU DIDN'T HAVE MONEY TO GET MORE.: NEVER TRUE

## 2022-07-08 SDOH — ECONOMIC STABILITY: FOOD INSECURITY: WITHIN THE PAST 12 MONTHS, YOU WORRIED THAT YOUR FOOD WOULD RUN OUT BEFORE YOU GOT MONEY TO BUY MORE.: NEVER TRUE

## 2022-07-08 ASSESSMENT — PATIENT HEALTH QUESTIONNAIRE - PHQ9
SUM OF ALL RESPONSES TO PHQ QUESTIONS 1-9: 0
SUM OF ALL RESPONSES TO PHQ9 QUESTIONS 1 & 2: 0
1. LITTLE INTEREST OR PLEASURE IN DOING THINGS: 0
SUM OF ALL RESPONSES TO PHQ QUESTIONS 1-9: 0
2. FEELING DOWN, DEPRESSED OR HOPELESS: 0

## 2022-07-08 ASSESSMENT — SOCIAL DETERMINANTS OF HEALTH (SDOH): HOW HARD IS IT FOR YOU TO PAY FOR THE VERY BASICS LIKE FOOD, HOUSING, MEDICAL CARE, AND HEATING?: NOT HARD AT ALL

## 2022-07-08 NOTE — PROGRESS NOTES
[facility]  [address]  [id number] [facility] [place].  Ozella Offer  [address]  [phone]  [phone]    Jacob Luevano is a 81 y.o. female who is here with c/o of:    Chief Complaint: Diabetes and Hypertension      Patient Accompanied by: daughter in law    HPI - Jacob Luevano is here today for f/u    DM   - she is NOT compliant with diabetic diet and is becoming more physically active - recently on vacation and had some extra treats, but now that she is home has started eating healthier   - she denies unintentional weight gain/loss, polyuria, polydipsia   - she has neuropathy of hands and feet and currently taking gabapentin 300mg tid   - current meds: Lantus 10units am, 10units p.m. and denies s/s of hypoglycemia, Invokana 300 mg daily, Ozempic 1 mg weekly and denies any adverse affects (she reports taking only 8 units bid)   - saw podiatry last week and denies any problems   - Ozempic patient assistance and gets medication here in the office    Hyperlipidemia   -Current medication atorvastatin 10 mg daily and denies myalgias        Lab Results   Component Value Date    LABA1C 8.8 07/08/2022    LABA1C 8.5 03/30/2022    LABA1C 8.0 12/10/2021     Lab Results   Component Value Date    LDLCALC 57 04/01/2022    CREATININE 0.50 04/01/2022         Patient Active Problem List   Diagnosis    COPD (chronic obstructive pulmonary disease) ([address]    Hypertension    Dyslipidemia    Osteoporosis    Osteoarthritis    Type 2 diabetes mellitus with diabetic polyneuropathy ([address]    Duodenal adenoma    Diabetic polyneuropathy associated with type 2 diabetes mellitus ([address]    Malignant neoplasm of duodenum ([address]    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[address]    Diverticular disease    Diarrhea    Gastric outlet obstruction    Hypercholesterolemia        Past Medical History:   Diagnosis Date    DM2 (diabetes mellitus, type 2) ([address]     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    WELL TO MODERATELY DIFFERENTIATED INVASIVE    Duodenal adenoma 2017    Hyperlipidemia     Hypertension     Osteoarthritis       Past Surgical History:   Procedure Laterality Date    ABDOMEN SURGERY  04/15/2020    Whipple Surgery     BREAST SURGERY      COLONOSCOPY      last one 4 years, pt is unsure of where    ID EGD REMOVAL TUMOR POLYP/OTHER LESION SNARE TECH N/A 4/3/2017    EGD POLYP SNARE performed by Neda Conner MD at [address] [place] [address]  2017    In the 2nd part of the duodenum there is a 3-4 cm mass lesion seen, occupies about half of the lumen-pathology--adenoma    UPPER GASTROINTESTINAL ENDOSCOPY N/A 2020    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     Family History   Problem Relation Age of Onset    Heart Disease Mother     No Known Problems Father      Social History     Tobacco Use    Smoking status: Former Smoker     Packs/day: 1.00     Years: 27.00     Pack years: 27.00     Types: Cigarettes     Quit date: 10/15/2003     Years since quittin.7    Smokeless tobacco: Never Used   Substance Use Topics    Alcohol use: No     ALLERGIES:    Allergies   Allergen Reactions    Nubain [Nalbuphine Hcl]     Hydroxyzine Other (See Comments), Nausea Only and Nausea And Vomiting    Vistaril [Hydroxyzine Hcl] Nausea And Vomiting          Subjective     · Constitutional:  Negative for activity change, appetite change,unexpected weight change, chills, fever, and fatigue. · HENT: Negative for ear pain, sore throat,  Rhinorrhea, sinus pain, sinus pressure, congestion. · Eyes:  Negative for pain and discharge. · Respiratory:  Negative for chest tightness, shortness of breath, wheezing, and cough. · Cardiovascular:  Negative for chest pain, palpitations and leg swelling. · Gastrointestinal: Negative for abdominal pain, blood in stool, constipation,diarrhea, nausea and vomiting.    · Endocrine: Negative for cold intolerance, heat intolerance, polydipsia, polyphagia and polyuria. · Genitourinary: Negative for difficulty urinating, dysuria, flank pain, frequency, hematuria and urgency. · Musculoskeletal: Negative for arthralgias, back pain, joint swelling, myalgias, neck pain and neck stiffness. · Skin: Negative for rash and wound  · Allergic/Immunologic: Negative for environmental allergies and food allergies. · Neurological:  Negative for dizziness, light-headedness, numbness and headaches. Positive for pain to hands and feet  · Hematological:  Negative for adenopathy. Does not bruise/bleed easily. · Psychiatric/Behavioral: Negative for self-injury, sleep disturbance and suicidal ideas. Objective     PHYSICAL EXAM:   · Constitutional: Teresita Bar is oriented to person, place, and time. Vital signs are normal. Appears well-developed and well-nourished. · HEENT:   · Head: Normocephalic and atraumatic. Eyes:PERRL, EOMI, Conjunctiva normal, No discharge. · Neck: Full passive range of motion. Non-tender on palpation. Neck supple. No thyromegaly present. Trachea normal.  · Cardiovascular: Normal rate, regular rhythm, S1, S2, no murmur, no gallop, no friction rub, intact distal pulses. No carotid bruit  · Pulmonary/Chest: Breath sounds are clear throughout, No respiratory distress, No wheezing, No chest tenderness. Effort normal  · Neurological: Alert and oriented to person, place, and time. Normal motor function, Normal sensory function, No focal deficits noted. She has normal strength. · Skin: Skin is warm, dry and intact. No obvious lesions on exposed skin. · Psychiatric: Normal mood and affect. Speech is normal and behavior is normal.     Nursing note and vitals reviewed. Blood pressure 128/63, pulse (!) 107, temperature 96.9 °F (36.1 °C), temperature source Temporal, weight 109 lb (49.4 kg), SpO2 96 %, not currently breastfeeding. Body mass index is 20.6 kg/m².     Wt Readings from Last 3 Encounters:   07/08/22 109 lb (49.4 kg)   03/30/22 102 lb (46.3 kg)   12/10/21 104 lb (47.2 kg)     BP Readings from Last 3 Encounters:   07/08/22 128/63   03/30/22 122/78   12/10/21 112/76       Results for POC orders placed in visit on 07/08/22   POCT glycosylated hemoglobin (Hb A1C)   Result Value Ref Range    Hemoglobin A1C 8.8 %       Completed Orders/Prescriptions   Orders Placed This Encounter   Medications    Insulin Degludec 200 UNIT/ML SOPN     Sig: Inject 10 Units into the skin 2 times daily     Dispense:  9 mL     Refill:  2               AssessmentPlan/Medical Decision Making     1. Type 2 diabetes mellitus with diabetic polyneuropathy, with long-term current use of insulin (HCC)  - reviewed diet and exercise  - worsening, but d/t recent vacation and has corrected diet  - POCT glycosylated hemoglobin (Hb A1C)  - continue Ozempic 1mg weekly - pt assistance  - Insulin Degludec 200 UNIT/ML SOPN; Inject 10 Units into the skin 2 times daily  Dispense: 9 mL; Refill: 2    2. Diabetic polyneuropathy associated with type 2 diabetes mellitus ([facility]  - continue gabapentin 300mg tid  - glycemic control to prevent worsening symptoms    3. Hypercholesterolemia  - continue atorvastatin 10mg daily    4. Anxiety  - controlled  - continue Effexor 37.5mg daily      Return in about 3 months (around 10/8/2022). 1.  Baig received counseling on the following healthy behaviors: nutrition, exercise and medication adherence  2. Patient given educational materials - see patient instructions  3. Was a self-tracking handout given in paper form or via [employer]? No  If yes, see orders or list here. 4.  Discussed use, benefit, and side effects of prescribed medications. Barriers to medication compliance addressed. All patient questions answered. Pt voiced understanding. 5.  Reviewed prior labs, imaging, consultation, follow up, and health maintenance  6. Continue current medications, diet and exercise.   7. Discussed use, benefit, and side effects of prescribed medications. Barriers to medication compliance addressed. All her questions were answered. Pt voiced understanding. Libra Méndez will continue current medications, diet and exercise. Patient given educational materials on diabetic diet    Medical Decision Making: Moderate    Of the 30 minute duration appointment visit, Radha Rod CNP spent greater than 50% of the face-to-face time in counseling, explanation of diagnosis, planning of further management, and answering all questions. Signed:  Radha Rod CNP    This note is created with the assistance of a speech-recognition program.  While intending to generate a document that actually reflects the content of the visit, no guarantees can be provided that every mistake has been identified and corrected by editing.

## 2022-08-11 ENCOUNTER — TELEPHONE (OUTPATIENT)
Dept: FAMILY MEDICINE CLINIC | Age: 82
End: 2022-08-11

## 2022-08-11 NOTE — TELEPHONE ENCOUNTER
Spoke with patient - she will increase her Tresiba to 12 units bid. Daughter will be in on Monday to p/u medication (pt assistance). Pt will call in on Monday with blood sugar readings after the dose increase.

## 2022-08-15 ENCOUNTER — TELEPHONE (OUTPATIENT)
Dept: FAMILY MEDICINE CLINIC | Age: 82
End: 2022-08-15

## 2022-08-15 NOTE — TELEPHONE ENCOUNTER
Daughter in law is asking 16U in am and 16 in pm, currently taking 10 in am and 10 in pm. Also wanted to know does she stop anything, someone told her to stop the one with the sliding scale

## 2022-08-15 NOTE — TELEPHONE ENCOUNTER
----- Message from Natanael Goddard sent at 8/15/2022  3:14 PM EDT -----  Subject: Message to Provider    QUESTIONS  Information for Provider? Patient is calling to update her blood sugar   levels since the change in her insulin, start of 12 units 8/12-morning   -116, noon-229, dinner-282 8/,259,242 8/,127,180 8/15- 93,227,     ---------------------------------------------------------------------------  --------------  Sabino RICCI  [phone]; OK to leave message on voicemail  ---------------------------------------------------------------------------  --------------  SCRIPT ANSWERS  Relationship to Patient?  Self

## 2022-08-15 NOTE — TELEPHONE ENCOUNTER
Yes it would be 16 units in the a.m. and 16 units in the p.m. - I spoke with Safia on 8/11 and increased her to 12 units 2 times daily - she may want to just do 14 units 2 times daily for 3 days and then go up to the 16 2 times daily if still having the severely elevated blood sugars - do not increase if fasting blood sugar is less than 90

## 2022-08-15 NOTE — TELEPHONE ENCOUNTER
The increase in her long acting insulin will decrease her blood sugar numbers so that she should not need to take the sliding scale. If she NEEDS the sliding scale, she should take it.

## 2022-08-18 DIAGNOSIS — E11.42 DIABETIC POLYNEUROPATHY ASSOCIATED WITH TYPE 2 DIABETES MELLITUS (HCC): ICD-10-CM

## 2022-08-22 RX ORDER — GABAPENTIN 300 MG/1
300 CAPSULE ORAL 3 TIMES DAILY
Qty: 180 CAPSULE | Refills: 0 | Status: SHIPPED | OUTPATIENT
Start: 2022-08-22 | End: 2022-10-21

## 2022-08-22 NOTE — TELEPHONE ENCOUNTER
Patient calling back to give readings as advised from last phone call. 14 UNITS - Tues. 8/16: AM = 138, Noon = 223 (sliding scale), PM = 69.    14 UNITS - Wed. 8/17: AM = 111, Noon = 147, PM = 300.    14 UNITS - Thurs.  8/18: AM = 147, Noon = 274, PM = 103.    16 UNITS -  Fri. 8/19: AM = 95, Noon = 209, PM = 65.    16 UNITS - Sat. 8/20: AM = 76, Noon = 133, PM = 348.    16 UNITS - Sun. 8/21: AM = 160, Noon = 251, PM = 71.    16 UNITS - Mon. 8/22: AM = 100, Noon = 249, PM = N/A

## 2022-08-23 NOTE — PROGRESS NOTES
Osteoarthritis        Past Surgical History:   Procedure Laterality Date    ABDOMEN SURGERY  04/15/2020    Whipple Surgery     BREAST SURGERY      COLONOSCOPY      last one 4 years, pt is unsure of where    PA EGD REMOVAL TUMOR POLYP/OTHER LESION SNARE TECH N/A 4/3/2017    EGD POLYP SNARE performed by Alejandro Herrera MD at [address] [place] [address]  04/03/2017    In the 2nd part of the duodenum there is a 3-4 cm mass lesion seen, occupies about half of the lumen-pathology--adenoma    UPPER GASTROINTESTINAL ENDOSCOPY N/A 2/13/2020    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       Allergies   Allergen Reactions    Nubain [Nalbuphine Hcl]     Hydroxyzine Other (See Comments), Nausea Only and Nausea And Vomiting    Vistaril [Hydroxyzine Hcl] Nausea And Vomiting       Current Outpatient Medications   Medication Sig Dispense Refill    TRUE METRIX BLOOD GLUCOSE TEST strip USE  STRIP TO CHECK GLUCOSE TWICE DAILY 100 each 0    meclizine (ANTIVERT) 12.5 MG tablet TAKE 1 TABLET THREE TIMES DAILY AS NEEDED 270 tablet 1    insulin 70-30 (NOVOLIN 70/30 RELION) (70-30) 100 UNIT per ML injection vial INJECT 30 UNITS SUBCUTANEOUSLY IN THE MORNING AND 30 IN THE EVENING (Patient taking differently: INJECT 30 UNITS SUBCUTANEOUSLY IN THE MORNING AND 10 IN THE EVENING) 40 mL 0    atorvastatin (LIPITOR) 10 MG tablet TAKE 1 TABLET EVERY DAY 90 tablet 3    lisinopril (PRINIVIL;ZESTRIL) 10 MG tablet TAKE 1 TABLET EVERY DAY 90 tablet 3    DROPLET INSULIN SYRINGE 30G X 5/16\" 1 ML MISC USE TO INJECT TWICE DAILY 200 each 5    aspirin 325 MG tablet Take 325 mg by mouth daily. No current facility-administered medications for this visit.         Social History     Socioeconomic History    Marital status:      Spouse name: Not on file    Number of children: Not on file    Years of education: Not on file    Highest education level: Not on file dysuria, nocturia, urinary incontinence, and hematuria   Integument: negative for rash, skin lesions, bruises.    Hematologic/Lymphatic: negative for easy bruising, bleeding, lymphadenopathy, petechiae and swelling/edema   Endocrine: negative for heat or cold intolerance, tremor, weight changes, change in bowel habits and hair loss   Musculoskeletal: negative for myalgias, arthralgias, pain, joint swelling,and bone pain   Neurological: negative for headaches, dizziness, seizures, weakness, numbness       OBJECTIVE:         Vitals:    08/19/20 1347   BP: (!) 143/60   Pulse: 95   Temp: 98.3 °F (36.8 °C)       PHYSICAL EXAM:   General appearance - well appearing, no in pain or distress   Mental status - alert and cooperative   Eyes - pupils equal and reactive, extraocular eye movements intact   Ears - bilateral TM's and external ear canals normal   Mouth - mucous membranes moist, pharynx normal without lesions   Neck - supple, no significant adenopathy   Lymphatics - no palpable lymphadenopathy, no hepatosplenomegaly   Chest - clear to auscultation, no wheezes, rales or rhonchi, symmetric air entry   Heart - normal rate, regular rhythm, normal S1, S2, no murmurs, rubs, clicks or gallops   Abdomen - soft, nontender, nondistended, no masses or organomegaly   Neurological - alert, oriented, normal speech, no focal findings or movement disorder noted   Musculoskeletal - no joint tenderness, deformity or swelling   Extremities - peripheral pulses normal, no pedal edema, no clubbing or cyanosis   Skin - normal coloration and turgor, no rashes, no suspicious skin lesions noted   LABORATORY DATA:     Lab Results   Component Value Date    WBC 11.1 08/03/2020    HGB 10.0 (L) 08/03/2020    HCT 33.7 (L) 08/03/2020    MCV 84.7 08/03/2020     (H) 08/03/2020    LYMPHOPCT 25 08/03/2020    RBC 3.98 08/03/2020    MCH 25.1 (L) 08/03/2020    MCHC 29.7 08/03/2020    RDW 20.6 (H) 08/03/2020    NEUTOPHILPCT 63.1 02/26/2020    MONOPCT 8 (H) 08/03/2020    EOSPCT 2.9 02/26/2020    BASOPCT 1 08/03/2020    NEUTROABS 7.10 08/03/2020    LYMPHSABS 2.78 08/03/2020    MONOSABS 0.89 (H) 08/03/2020    EOSABS 0.11 08/03/2020    BASOSABS 0.11 08/03/2020         Chemistry        Component Value Date/Time     08/03/2020 1403    K 4.1 08/03/2020 1403    CL 99 08/03/2020 1403    CO2 29 08/03/2020 1403    BUN 10 08/03/2020 1403    CREATININE 0.49 (L) 08/03/2020 1403        Component Value Date/Time    CALCIUM 8.2 (L) 08/03/2020 1403    ALKPHOS 476 (H) 08/03/2020 1403    AST 79 (H) 08/03/2020 1403    ALT 41 (H) 08/03/2020 1403    BILITOT 0.19 (L) 08/03/2020 1403            PATHOLOGY DATA:   SURGICAL PATHOLOGY CONSULTATION Collected: 2/13/2020   -- Diagnosis --   DUODENUM, BIOPSIES:        -  WELL-TO MODERATELY DIFFERENTIATED INVASIVE ADENOCARCINOMA. Pathology report, [facility] 4/15/20:  Final diagnosis:  A. Stomach, small intestine, and head of pancreas, Whipple resection:  Intra-and periampullary adenocarcinoma, moderately differentiated, mixed intestinal and pancreaticobiliary type, invasive carcinoma length 2.5 cm, carcinoma invades duodenal submucosa and muscularis propria. Lymphovascular invasion present present, duodenal and intra-ampullary adenomatous change margins are negative for invasive carcinoma and high-grade dysplasia  Metastatic  Carcinoma, 4 out of 18 lymph nodes  B. Gallbladder, resection:  Chronic cholecystitis with adenomyoma, negative for malignancy or dysplasia  C; lymph node, hepatic artery excision:  Lymph node with lipogranulomata, negative for malignancy  D.   Lymph nodes, portal, regional resection:  8 lymph nodes with lipogranulomata, negative for malignancy  E.  Bile duct, final margin, excision:  Benign bile duct and peripheral nerves, negative for malignancy    Pathology stage: [id number]     IMAGING DATA:    Reviewed  CT abdomen pelvis 2/21/2020:  Redemonstration of duodenal mass at the level of the ampulla resulting in   mild pancreatic duct and biliary enlargement.  No evidence for bowel   obstruction or adjacent inflammatory change.       Diverticulosis.  No colonic mass delineated on this exam.     ASSESSMENT:    In total, I spent greater than 80 minutes in face-to-face consultation with the patient and the majority of this time was spent in education, counseling, and coordination of care. During our encounter, I personally reviewed the above history and evaluation, including radiology and pathology data, in detail    Enriqueta Daniels Is a 79 y.o. 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 I reviewed the laboratory data, imaging studies, surgical pathology, diagnosis, prognosis, treatment options and goals of care with patient and family. Patient is recovering from surgery well. She is accompanied by her son during today's office visit. I explained that as per the NCCN guidelines patient would need adjuvant systemic chemotherapy due to high rate of recurrence. Patient and her son are adamant that if the cancer is out, they do not want to consider any chemotherapy or radiation therapy. I discussed the risks benefits and explained that without chemotherapy her chance of cancer coming back is very high. Patient understand the risk. During today's visit, the patient and the family had a number of reasonable questions which were answered to their satisfaction. They verbalized understanding of the information provided and they agreed to proceed as outlined above.    PLAN:   I reviewed the goals of care  I discussed the role of adjuvant treatment, however patient and son very adamant about not getting any chemotherapy or radiation therapy  She understand the risk and benefits  I discussed recent scan which showed stable disease however her tumor markers slightly increased  She continued to refuse chemotherapy  Return to clinic in 6 weeks with labs prior        Mckay Munoz MD  Hematologist/Medical Oncologist      This note is created with the assistance of a speech recognition program.  While intending to generate a document that actually reflects the content of the visit, the document can still have some errors including those of syntax and sound a like substitutions which may escape proof reading. It such instances, actual meaning can be extrapolated by contextual diversion. Clindamycin Counseling: I counseled the patient regarding use of clindamycin as an antibiotic for prophylactic and/or therapeutic purposes. Clindamycin is active against numerous classes of bacteria, including skin bacteria. Side effects may include nausea, diarrhea, gastrointestinal upset, rash, hives, yeast infections, and in rare cases, colitis.

## 2022-08-29 DIAGNOSIS — E78.00 HYPERCHOLESTEROLEMIA: ICD-10-CM

## 2022-08-29 NOTE — TELEPHONE ENCOUNTER
Alee Wallis is calling to request a refill on the following medication(s):    Medication Request:  Requested Prescriptions     Pending Prescriptions Disp Refills    atorvastatin (LIPITOR) 10 MG tablet [Pharmacy Med Name: ATORVASTATIN 10MG TABLETS] 90 tablet 3     Sig: TAKE 1 TABLET BY MOUTH DAILY       Last Visit Date (If Applicable):  3/1/4788 in office    Next Visit Date:    10/11/2022 EST Aimee

## 2022-08-30 RX ORDER — ATORVASTATIN CALCIUM 10 MG/1
10 TABLET, FILM COATED ORAL DAILY
Qty: 90 TABLET | Refills: 0 | Status: SHIPPED | OUTPATIENT
Start: 2022-08-30

## 2022-09-06 ENCOUNTER — TELEPHONE (OUTPATIENT)
Dept: FAMILY MEDICINE CLINIC | Age: 82
End: 2022-09-06

## 2022-09-06 NOTE — TELEPHONE ENCOUNTER
Patient notified that pen needles from the patient assistance program have arrived and are ready for . Patient had a question but asked if she could call back because she was currently in the bathroom.

## 2022-09-06 NOTE — TELEPHONE ENCOUNTER
Patient called back. She wants to know if she can get the Sliding Scale Flex Pen on the patient assistant program too?

## 2022-09-06 NOTE — TELEPHONE ENCOUNTER
I called Safia back. In her chart it shows the the sliding scale insulin was d/c'd in April. She is confused and said one person told her to stop it and another told her to continue it. She says she uses it at lunch time depending on what her blood sugar is.  I will talk to Narendra Sharma on Thursday and clarify this

## 2022-09-08 NOTE — TELEPHONE ENCOUNTER
After speaking with our pharmacist Jerrel Epley it was advised that [address] completely stop the lunchtime sliding scale dose. When she sees Candy Pages on 10/11/22 there will probably be labs done and determination of the correct medications will be done then.   Patient has been advised of this and voiced understanding

## 2022-09-27 DIAGNOSIS — Z79.4 TYPE 2 DIABETES MELLITUS WITH DIABETIC POLYNEUROPATHY, WITH LONG-TERM CURRENT USE OF INSULIN (HCC): ICD-10-CM

## 2022-09-27 DIAGNOSIS — E11.42 TYPE 2 DIABETES MELLITUS WITH DIABETIC POLYNEUROPATHY, WITH LONG-TERM CURRENT USE OF INSULIN (HCC): ICD-10-CM

## 2022-09-27 RX ORDER — CALCIUM CITRATE/VITAMIN D3 200MG-6.25
TABLET ORAL
Qty: 150 EACH | Refills: 11 | Status: SHIPPED | OUTPATIENT
Start: 2022-09-27

## 2022-09-27 NOTE — TELEPHONE ENCOUNTER
Nicholas Or is calling to request a refill on the following medication(s):    Medication Request:  Requested Prescriptions     Pending Prescriptions Disp Refills    TRUE METRIX BLOOD GLUCOSE TEST strip 150 each 5     Sig: As needed.        Last Visit Date (If Applicable):  9/0/9044    Next Visit Date:    10/11/2022

## 2022-09-30 NOTE — TELEPHONE ENCOUNTER
Last Visit:  7/8/2022     Next Visit Date:  Future Appointments   Date Time Provider Jessica Ashraf   10/21/2022  2:20 PM Luis Sports, APRN - CNP [address] [place] [id number] [address] Maintenance   Topic Date Due    COVID-19 Vaccine (1) Never done    DTaP/Tdap/Td vaccine (1 - Tdap) Never done    Shingles vaccine (1 of 2) Never done    DEXA (modify frequency per FRAX score)  Never done    Pneumococcal 65+ years Vaccine (2 - PPSV23 or PCV20) 10/27/2016    Annual Wellness Visit (AWV)  05/04/2022    Flu vaccine (1) 08/01/2022    Lipids  04/01/2023    Depression Screen  07/08/2023    Hepatitis A vaccine  Aged Out    Hib vaccine  Aged Out    Meningococcal (ACWY) vaccine  Aged Out       Hemoglobin A1C (%)   Date Value   07/08/2022 8.8   03/30/2022 8.5   12/10/2021 8.0             ( goal A1C is < 7)   No results found for: LABMICR  LDL Cholesterol (mg/dL)   Date Value   10/20/2020 46   06/14/2017 107     LDL Calculated (mg/dL)   Date Value   04/01/2022 57   12/12/2019 76       (goal LDL is <100)   AST (U/L)   Date Value   04/01/2022 67     ALT (U/L)   Date Value   04/01/2022 67     BUN (mg/dL)   Date Value   04/01/2022 10     BP Readings from Last 3 Encounters:   07/08/22 128/63   03/30/22 122/78   12/10/21 112/76          (goal 120/80)    All Future Testing planned in [facility]  Lab Frequency Next Occurrence               Patient Active Problem List:     COPD (chronic obstructive pulmonary disease) ([facility]     Hypertension     Dyslipidemia     Osteoporosis     Osteoarthritis     Type 2 diabetes mellitus with diabetic polyneuropathy (HCC)     Duodenal adenoma     Diabetic polyneuropathy associated with type 2 diabetes mellitus (HCC)     Malignant neoplasm of duodenum (HCC)     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HCC)     Diverticular disease     Diarrhea     Gastric outlet obstruction     Hypercholesterolemia

## 2022-10-03 NOTE — TELEPHONE ENCOUNTER
Gege Najera is calling to request a refill on the following medication(s):    Medication Request:  Requested Prescriptions     Pending Prescriptions Disp Refills    apixaban (ELIQUIS) 5 MG TABS tablet 180 tablet 1     Sig: Take 1 tablet by mouth 2 times daily       Last Visit Date (If Applicable):  Visit date not found    Next Visit Date:    10/21/2022

## 2022-10-21 ENCOUNTER — OFFICE VISIT (OUTPATIENT)
Dept: FAMILY MEDICINE CLINIC | Age: 82
End: 2022-10-21
Payer: MEDICARE

## 2022-10-21 VITALS — HEART RATE: 85 BPM | BODY MASS INDEX: 21.54 KG/M2 | TEMPERATURE: 97.2 F | WEIGHT: 114 LBS | OXYGEN SATURATION: 97 %

## 2022-10-21 DIAGNOSIS — E11.42 TYPE 2 DIABETES MELLITUS WITH DIABETIC POLYNEUROPATHY, WITH LONG-TERM CURRENT USE OF INSULIN (HCC): ICD-10-CM

## 2022-10-21 DIAGNOSIS — Z79.4 TYPE 2 DIABETES MELLITUS WITH DIABETIC POLYNEUROPATHY, WITH LONG-TERM CURRENT USE OF INSULIN (HCC): ICD-10-CM

## 2022-10-21 DIAGNOSIS — Z76.89 ENCOUNTER TO ESTABLISH CARE: Primary | ICD-10-CM

## 2022-10-21 LAB — HBA1C MFR BLD: 9.4 %

## 2022-10-21 PROCEDURE — G8400 PT W/DXA NO RESULTS DOC: HCPCS

## 2022-10-21 PROCEDURE — 1090F PRES/ABSN URINE INCON ASSESS: CPT

## 2022-10-21 PROCEDURE — G8484 FLU IMMUNIZE NO ADMIN: HCPCS

## 2022-10-21 PROCEDURE — 83036 HEMOGLOBIN GLYCOSYLATED A1C: CPT

## 2022-10-21 PROCEDURE — G8420 CALC BMI NORM PARAMETERS: HCPCS

## 2022-10-21 PROCEDURE — 3046F HEMOGLOBIN A1C LEVEL >9.0%: CPT

## 2022-10-21 PROCEDURE — 1036F TOBACCO NON-USER: CPT

## 2022-10-21 PROCEDURE — G8427 DOCREV CUR MEDS BY ELIG CLIN: HCPCS

## 2022-10-21 PROCEDURE — 1123F ACP DISCUSS/DSCN MKR DOCD: CPT

## 2022-10-21 PROCEDURE — 99213 OFFICE O/P EST LOW 20 MIN: CPT

## 2022-10-21 RX ORDER — SEMAGLUTIDE 1.34 MG/ML
2 INJECTION, SOLUTION SUBCUTANEOUS WEEKLY
Qty: 3 ML | Refills: 0
Start: 2022-10-21

## 2022-10-21 ASSESSMENT — PATIENT HEALTH QUESTIONNAIRE - PHQ9
SUM OF ALL RESPONSES TO PHQ QUESTIONS 1-9: 2
1. LITTLE INTEREST OR PLEASURE IN DOING THINGS: 1
2. FEELING DOWN, DEPRESSED OR HOPELESS: 1
SUM OF ALL RESPONSES TO PHQ9 QUESTIONS 1 & 2: 2

## 2022-10-21 ASSESSMENT — ENCOUNTER SYMPTOMS
WHEEZING: 0
ABDOMINAL PAIN: 0
COLOR CHANGE: 0
SORE THROAT: 0
CONSTIPATION: 0
SHORTNESS OF BREATH: 0
NAUSEA: 0
EYE DISCHARGE: 0
COUGH: 0
VOMITING: 0
CHEST TIGHTNESS: 0
DIARRHEA: 0

## 2022-10-21 NOTE — PATIENT INSTRUCTIONS
STOP taking nightly Tresiba, START taking 10 units of Tresiba every morning  START Ozempic 2 mg weekly

## 2022-10-21 NOTE — PROGRESS NOTES
Truman Prader (:  1940) is a 81 y.o. female,Established patient, here for evaluation of the following chief complaint(s):  Establish Care (Previous Provider Juani Novak)          Subjective   SUBJECTIVE/OBJECTIVE:  Pt here today to establish care, accompanied by her daughter in law  VS and weight stable    A1c continues to climb, 9.4 today up from 8.8. Pt has been taking meds as prescribed, reportedly does not fully follow a diabetic diet. Her glucose logs show she is having significant low readings in the mornings 60/70s of which she is symptomatic. Pt has worsening neuropathy sx to bilateral hands w/ worsening control. She has been taking Tresiba 16 units BID; encouraged her to stop this and only take 10 in the morning to prevent morning lows. Will increase Ozempic as pt tolerates this well and re-evaluate. No other questions or concerns      Review of Systems   Constitutional:  Negative for activity change, appetite change, chills, fatigue, fever and unexpected weight change. HENT:  Negative for congestion, ear pain and sore throat. Eyes:  Negative for discharge and visual disturbance. Respiratory:  Negative for cough, chest tightness, shortness of breath and wheezing. Cardiovascular:  Negative for chest pain, palpitations and leg swelling. Gastrointestinal:  Negative for abdominal pain, constipation, diarrhea, nausea and vomiting. Genitourinary:  Negative for dysuria and pelvic pain. Musculoskeletal:  Positive for arthralgias. Negative for gait problem and neck pain. Skin:  Negative for color change, rash and wound. Neurological:  Negative for dizziness, seizures, syncope, weakness, light-headedness, numbness and headaches. Psychiatric/Behavioral:  Negative for behavioral problems and confusion. Objective   Physical Exam  Vitals and nursing note reviewed. Constitutional:       General: She is not in acute distress. Appearance: Normal appearance.  She is well-developed. She is not ill-appearing, toxic-appearing or diaphoretic. HENT:      Head: Normocephalic and atraumatic. Right Ear: External ear normal.      Left Ear: External ear normal.      Nose: Nose normal.   Eyes:      General: No scleral icterus. Right eye: No discharge. Left eye: No discharge. Conjunctiva/sclera: Conjunctivae normal.      Pupils: Pupils are equal, round, and reactive to light. Neck:      Trachea: No tracheal deviation. Cardiovascular:      Rate and Rhythm: Normal rate and regular rhythm. Heart sounds: Normal heart sounds. No murmur heard. No friction rub. No gallop. Pulmonary:      Effort: Pulmonary effort is normal. No tachypnea, accessory muscle usage or respiratory distress. Breath sounds: Normal breath sounds. No stridor. No decreased breath sounds, wheezing, rhonchi or rales. Abdominal:      Palpations: Abdomen is soft. Musculoskeletal:         General: No tenderness or deformity. Normal range of motion. Cervical back: Normal range of motion and neck supple. Skin:     General: Skin is warm and dry. Coloration: Skin is not pale. Findings: No erythema or rash. Neurological:      Mental Status: She is alert and oriented to person, place, and time. GCS: GCS eye subscore is 4. GCS verbal subscore is 5. GCS motor subscore is 6. Gait: Gait is intact. Gait normal.   Psychiatric:         Speech: Speech normal.         Behavior: Behavior normal.         Thought Content: Thought content normal.         Judgment: Judgment normal.               ASSESSMENT/PLAN:  1. Encounter to establish care    2.  Type 2 diabetes mellitus with diabetic polyneuropathy, with long-term current use of insulin (HCC)  -uncontrolled, plan to STOP nightly Tresiba d/t low glucose in AM and change to 10 units daily in AM  -increase Ozempic to 2 mg weekly  -instructed pt if she has any reoccurring low glucose readings we will continue to cut back on Tresiba    -     POCT glycosylated hemoglobin (Hb A1C)  -     Insulin Degludec 200 UNIT/ML SOPN; Inject 10 Units into the skin daily, Disp-9 mL, R-2Adjust Sig      Return in about 3 months (around 1/21/2023), or if symptoms worsen or fail to improve, for DM. An electronic signature was used to authenticate this note.     --Larissa Smith, APRN - CNP

## 2022-11-02 ENCOUNTER — TELEPHONE (OUTPATIENT)
Dept: FAMILY MEDICINE CLINIC | Age: 82
End: 2022-11-02

## 2022-11-02 NOTE — TELEPHONE ENCOUNTER
----- Message from Colleen Iglesias sent at 11/2/2022  8:34 AM EDT -----  Subject: Message to Provider    QUESTIONS  Information for Provider? Monserrat Alicia from [facility], PT home health nurse   wants to know if PT PCP, Herve Jacques is able to sign home health orders or   if Devan Pizano will sign them. Please reach out to Monserrat Alicia to discuss at   [phone]   ---------------------------------------------------------------------------  --------------  [employer] [employer]  [phone]; OK to leave message on voicemail  ---------------------------------------------------------------------------  --------------  SCRIPT ANSWERS  Relationship to Patient? Third Party  Third Party Type? Home Health Care? Representative Name?  Monserrat Alicia

## 2022-11-03 ENCOUNTER — TELEPHONE (OUTPATIENT)
Dept: FAMILY MEDICINE CLINIC | Age: 82
End: 2022-11-03

## 2022-11-03 NOTE — TELEPHONE ENCOUNTER
Since Mirna Adrian isn't PECOS certified through [facility] billing system. They will need Dr. Veronica Gutierrez or Olman Biswas to sign her orders for home health care? Nuno Arce will fax orders over today.

## 2022-11-03 NOTE — TELEPHONE ENCOUNTER
Since fall patients BS has been 200-300. Should Casi start her back on her sliding scale or start back on the nightly dose?  Please  advise

## 2022-11-28 ENCOUNTER — TELEPHONE (OUTPATIENT)
Dept: FAMILY MEDICINE CLINIC | Age: 82
End: 2022-11-28

## 2022-11-28 DIAGNOSIS — E78.00 HYPERCHOLESTEROLEMIA: ICD-10-CM

## 2022-11-28 RX ORDER — SEMAGLUTIDE 1.34 MG/ML
2 INJECTION, SOLUTION SUBCUTANEOUS WEEKLY
Qty: 3 ML | Refills: 0 | Status: CANCELLED | OUTPATIENT
Start: 2022-11-28

## 2022-11-28 RX ORDER — ATORVASTATIN CALCIUM 10 MG/1
10 TABLET, FILM COATED ORAL DAILY
Qty: 90 TABLET | Refills: 0 | Status: SHIPPED | OUTPATIENT
Start: 2022-11-28

## 2022-11-28 NOTE — TELEPHONE ENCOUNTER
Medication Management Service ([address] [place] [address])  [facility]  [phone]     CLINICAL PHARMACY NOTE:      Patient currently enrolled in [address] [place] [address] Patient Assistance program for Jim Gutiérrez. Ozempic dose has been increased to 2mg weekly. Re-order form faxed to [address] [place] [address] for update regarding increased dose and change in PCP. Re-order form uploaded to media tab along with fax confirmation. Kavin Rahman Pharm. D., [address] Medication Management Service  [phone]  11/28/2022  4:35 PM      ================================================  For Pharmacy Admin Tracking Only    CPA in place:  No  Recommendation Provided To: Provider: 1 via Verbally to provider  Intervention Detail: Patient Access Assistance/Sample Provided  Intervention Accepted By: Provider: 1  Time Spent (min): 30

## 2022-11-28 NOTE — TELEPHONE ENCOUNTER
Prerna Brown is calling to request a refill on the following medication(s):    Medication Request:  Requested Prescriptions     Pending Prescriptions Disp Refills    atorvastatin (LIPITOR) 10 MG tablet 90 tablet 0     Sig: Take 1 tablet by mouth daily       Last Visit Date (If Applicable):  15/66/9562    Next Visit Date:    12/2/2022

## 2022-11-28 NOTE — TELEPHONE ENCOUNTER
Medication Management Service ([address] [place] [address])  [facility]  [phone]     CLINICAL PHARMACY NOTE:     Ozempic dose increased to 2mg weekly on 10/21/2022 by PCP. Re-order form for Ozempic 2mg weekly faxed on 11/28/2022 to [facility] as patient has been approved for patient assistance. Office sample set aside for patient to  as it may take a few weeks before medication is received in the office. Next PCP appointment scheduled for 12/08/2022. Lilliana Morrison, Pharm. D., [address] [facility] Medication Management Service  [phone]  12/5/2022  9:49 AM

## 2022-12-05 DIAGNOSIS — E11.42 DIABETIC POLYNEUROPATHY ASSOCIATED WITH TYPE 2 DIABETES MELLITUS (HCC): ICD-10-CM

## 2022-12-05 RX ORDER — GABAPENTIN 300 MG/1
300 CAPSULE ORAL 3 TIMES DAILY
Qty: 90 CAPSULE | Refills: 0 | Status: SHIPPED | OUTPATIENT
Start: 2022-12-05 | End: 2023-01-04

## 2022-12-05 RX ORDER — SEMAGLUTIDE 2.68 MG/ML
2 INJECTION, SOLUTION SUBCUTANEOUS
Qty: 3 ML | Refills: 0 | COMMUNITY
Start: 2022-12-05

## 2022-12-08 ENCOUNTER — OFFICE VISIT (OUTPATIENT)
Dept: FAMILY MEDICINE CLINIC | Age: 82
End: 2022-12-08
Payer: MEDICARE

## 2022-12-08 ENCOUNTER — TELEPHONE (OUTPATIENT)
Dept: FAMILY MEDICINE CLINIC | Age: 82
End: 2022-12-08

## 2022-12-08 VITALS
OXYGEN SATURATION: 98 % | HEART RATE: 95 BPM | DIASTOLIC BLOOD PRESSURE: 68 MMHG | HEIGHT: 61 IN | BODY MASS INDEX: 20.01 KG/M2 | SYSTOLIC BLOOD PRESSURE: 100 MMHG | WEIGHT: 106 LBS

## 2022-12-08 DIAGNOSIS — S42.201D CLOSED FRACTURE OF PROXIMAL END OF RIGHT HUMERUS WITH ROUTINE HEALING, UNSPECIFIED FRACTURE MORPHOLOGY, SUBSEQUENT ENCOUNTER: ICD-10-CM

## 2022-12-08 DIAGNOSIS — Z79.4 TYPE 2 DIABETES MELLITUS WITH DIABETIC POLYNEUROPATHY, WITH LONG-TERM CURRENT USE OF INSULIN (HCC): ICD-10-CM

## 2022-12-08 DIAGNOSIS — Z09 HOSPITAL DISCHARGE FOLLOW-UP: Primary | ICD-10-CM

## 2022-12-08 DIAGNOSIS — Z78.0 POSTMENOPAUSAL: ICD-10-CM

## 2022-12-08 DIAGNOSIS — E11.42 TYPE 2 DIABETES MELLITUS WITH DIABETIC POLYNEUROPATHY, WITH LONG-TERM CURRENT USE OF INSULIN (HCC): ICD-10-CM

## 2022-12-08 DIAGNOSIS — Z91.81 AT HIGH RISK FOR FALLS: ICD-10-CM

## 2022-12-08 DIAGNOSIS — Z87.39 HX OF OSTEOPOROSIS: ICD-10-CM

## 2022-12-08 PROCEDURE — 3078F DIAST BP <80 MM HG: CPT

## 2022-12-08 PROCEDURE — 3046F HEMOGLOBIN A1C LEVEL >9.0%: CPT

## 2022-12-08 PROCEDURE — 3074F SYST BP LT 130 MM HG: CPT

## 2022-12-08 PROCEDURE — G8427 DOCREV CUR MEDS BY ELIG CLIN: HCPCS

## 2022-12-08 PROCEDURE — 99213 OFFICE O/P EST LOW 20 MIN: CPT

## 2022-12-08 PROCEDURE — 1123F ACP DISCUSS/DSCN MKR DOCD: CPT

## 2022-12-08 PROCEDURE — 1090F PRES/ABSN URINE INCON ASSESS: CPT

## 2022-12-08 PROCEDURE — G8484 FLU IMMUNIZE NO ADMIN: HCPCS

## 2022-12-08 PROCEDURE — G8420 CALC BMI NORM PARAMETERS: HCPCS

## 2022-12-08 PROCEDURE — 1036F TOBACCO NON-USER: CPT

## 2022-12-08 PROCEDURE — G8400 PT W/DXA NO RESULTS DOC: HCPCS

## 2022-12-08 RX ORDER — HYDROCODONE BITARTRATE AND ACETAMINOPHEN 5; 325 MG/1; MG/1
TABLET ORAL
COMMUNITY
Start: 2022-10-30

## 2022-12-08 ASSESSMENT — ENCOUNTER SYMPTOMS
COLOR CHANGE: 0
SORE THROAT: 0
VOMITING: 0
DIARRHEA: 0
CONSTIPATION: 0
ABDOMINAL PAIN: 0
CHEST TIGHTNESS: 0
EYE DISCHARGE: 0
COUGH: 0
WHEEZING: 0
NAUSEA: 0
SHORTNESS OF BREATH: 0

## 2022-12-08 NOTE — TELEPHONE ENCOUNTER
Medication Management Service ([address] [place] [address])  [facility]  [phone]     CLINICAL PHARMACY NOTE:      2023 Re-Enrollment application for [employer] patient assistance program completed, faxed with confirmation received, and uploaded into Epic. Requested the following: (Joseph, Lina Alatorre, and rd ambriz). Elias Hatchet, Pharm. D., [address] Medication Management Service  [phone]  12/8/2022  5:29 PM      =============================================  For Pharmacy Admin Tracking Only    CPA in place:  No  Intervention Detail: Patient Access Assistance/Sample Provided  Time Spent (min): 60

## 2022-12-08 NOTE — PROGRESS NOTES
Jevon Turner (:  1940) is a 81 y.o. female,Established patient, here for evaluation of the following chief complaint(s):  Follow-Up from Hospital, Arm Injury (Right arm doing okay.), Fall, and Hand Pain (Right hand hurts sometimes )          Subjective   SUBJECTIVE/OBJECTIVE:  Pt here today for hospital f/u  VSS    Pt was admitted to [facility] 10/28 after a fall w/ acute right humeral fracture. She underwent surgical repair and was d/c'd 10/31. Since being home pt is doing well, her BS have been running 200-300 but she is no longer having low readings were which the greater concern initially. Pt and daughter state she has had a hx of osteoporosis for over 10 years. Pt reports she had a DEXA scan done \"a long time ago\" and she was told that she had osteoporosis but was never initiated on any supplementation. Pt is wanting a repeat DEXA for evaluation after significant fracture. She will start OTC supplementation until completion for eval.     Pt requested refill for Eliquis, it is unclear as to why she is on this. No hx of Afib, she states it was started when she was dx w/ cancer d/t high risk for clots. D/t recent fall, pt's age, risk vs benefit daughter and pt are agreeable to stop the Eliquis as she is now cancer free and no known reasoning to continue this. Pt is tolerating the increased dose of Ozempic since last OV, will evaluate A1C in one month. Review of Systems   Constitutional:  Negative for activity change, appetite change, chills, fatigue, fever and unexpected weight change. HENT:  Negative for congestion, ear pain and sore throat. Eyes:  Negative for discharge and visual disturbance. Respiratory:  Negative for cough, chest tightness, shortness of breath and wheezing. Cardiovascular:  Negative for chest pain, palpitations and leg swelling. Gastrointestinal:  Negative for abdominal pain, constipation, diarrhea, nausea and vomiting.    Genitourinary:  Negative for dysuria and pelvic pain. Musculoskeletal:  Positive for arthralgias and myalgias. Negative for gait problem and neck pain. Skin:  Negative for color change, rash and wound. Neurological:  Negative for dizziness, seizures, syncope, weakness, light-headedness, numbness and headaches. Psychiatric/Behavioral:  Negative for behavioral problems, confusion and sleep disturbance. Objective   Physical Exam  Vitals and nursing note reviewed. Constitutional:       General: She is not in acute distress. Appearance: Normal appearance. She is well-developed. She is not ill-appearing, toxic-appearing or diaphoretic. HENT:      Head: Normocephalic and atraumatic. Right Ear: External ear normal.      Left Ear: External ear normal.      Nose: Nose normal.   Eyes:      General: No scleral icterus. Right eye: No discharge. Left eye: No discharge. Conjunctiva/sclera: Conjunctivae normal.      Pupils: Pupils are equal, round, and reactive to light. Neck:      Vascular: No carotid bruit. Trachea: No tracheal deviation. Cardiovascular:      Rate and Rhythm: Normal rate and regular rhythm. Heart sounds: Normal heart sounds. No murmur heard. No friction rub. No gallop. Pulmonary:      Effort: Pulmonary effort is normal. No tachypnea, accessory muscle usage or respiratory distress. Breath sounds: Normal breath sounds. No stridor. No decreased breath sounds, wheezing, rhonchi or rales. Abdominal:      Palpations: Abdomen is soft. Musculoskeletal:         General: No deformity. Right shoulder: Tenderness present. Decreased range of motion. Arms:       Cervical back: Normal range of motion and neck supple. Comments: Well healing surgical incision to right anterior arm, pt remains in sling w/ reduced ROM. Skin:     General: Skin is warm and dry. Coloration: Skin is not pale. Findings: No erythema or rash.    Neurological:      Mental Status: She is alert and oriented to person, place, and time. GCS: GCS eye subscore is 4. GCS verbal subscore is 5. GCS motor subscore is 6. Gait: Gait is intact. Gait normal.   Psychiatric:         Speech: Speech normal.         Behavior: Behavior normal.         Thought Content: Thought content normal.         Judgment: Judgment normal.               ASSESSMENT/PLAN:  1. Hospital discharge follow-up  2. Closed fracture of proximal end of right humerus with routine healing, unspecified fracture morphology, subsequent encounter  -pt healing well, following w/ orthopedics  -pain controlled on PRN tylenol, continue daily meds as ordered    3. Type 2 diabetes mellitus with diabetic polyneuropathy, with long-term current use of insulin (HCC)  -uncontrolled, BS averaging 200-300  -tolerating increased ozempic dose well from last OV  -due for A1C re-check in 1 month, continue current medications until next OV    4. Hx of osteoporosis  5. Postmenopausal  -historically stated but no DEXA available d/t prolonged length of time  -pt and daughter requesting repeat for eval  -pt to start OTC supplementation and will assess recommendations once DEXA completed    -     DEXA BONE DENSITY 2 SITES; Future    6. At high risk for falls  -will assess DEXA results once completed to begin supplementation to reduce risk of acute fracture d/t increased fall risk  -decision to d/c Eliquis as we cannot identify clinical reasoning to continue now that pt is free of cancer and risk vs benefit    Return in about 6 weeks (around 1/20/2023), or if symptoms worsen or fail to improve, for keep appt . An electronic signature was used to authenticate this note.     --PA Street - CNP

## 2022-12-19 DIAGNOSIS — E11.42 TYPE 2 DIABETES MELLITUS WITH DIABETIC POLYNEUROPATHY, WITH LONG-TERM CURRENT USE OF INSULIN (HCC): ICD-10-CM

## 2022-12-19 DIAGNOSIS — Z79.4 TYPE 2 DIABETES MELLITUS WITH DIABETIC POLYNEUROPATHY, WITH LONG-TERM CURRENT USE OF INSULIN (HCC): ICD-10-CM

## 2022-12-19 RX ORDER — INSULIN ASPART 100 [IU]/ML
INJECTION, SOLUTION INTRAVENOUS; SUBCUTANEOUS
Qty: 5 ADJUSTABLE DOSE PRE-FILLED PEN SYRINGE | Refills: 0 | Status: SHIPPED | OUTPATIENT
Start: 2022-12-19

## 2022-12-19 NOTE — TELEPHONE ENCOUNTER
Jevon Turner is calling to request a refill on the following medication(s):    Medication Request:  Requested Prescriptions     Pending Prescriptions Disp Refills    insulin aspart (NOVOLOG FLEXPEN) 100 UNIT/ML injection pen       Sig: TIDbefore meals 141-180 6units,181-220 8units,221-260 10units,261-300 12units,301-350 14units,351-400 16 units,>400 18units [id number] daily       Last Visit Date (If Applicable):  05/5/1792    Next Visit Date:    1/20/2023

## 2023-01-06 ENCOUNTER — HOSPITAL ENCOUNTER (OUTPATIENT)
Dept: MAMMOGRAPHY | Age: 83
End: 2023-01-06
Payer: MEDICARE

## 2023-01-06 DIAGNOSIS — Z87.39 HX OF OSTEOPOROSIS: ICD-10-CM

## 2023-01-06 DIAGNOSIS — Z78.0 POSTMENOPAUSAL: ICD-10-CM

## 2023-01-06 PROCEDURE — 77080 DXA BONE DENSITY AXIAL: CPT

## 2023-01-09 ENCOUNTER — TELEPHONE (OUTPATIENT)
Dept: FAMILY MEDICINE CLINIC | Age: 83
End: 2023-01-09

## 2023-01-09 NOTE — TELEPHONE ENCOUNTER
Patient called requesting to reorder Stefanie Luisroxann, she is on her last injetion, for she is on patient assistance and her daughter will .  [phone]

## 2023-01-10 RX ORDER — SEMAGLUTIDE 2.68 MG/ML
2 INJECTION, SOLUTION SUBCUTANEOUS
Qty: 3 ML | Refills: 0 | Status: SHIPPED | OUTPATIENT
Start: 2023-01-10

## 2023-01-10 NOTE — TELEPHONE ENCOUNTER
[facility] is calling to request a refill on the following medication(s):    Medication Request:  Requested Prescriptions     Pending Prescriptions Disp Refills    Semaglutide, 2 MG/DOSE, (OZEMPIC, 2 MG/DOSE,) 8 MG/3ML SOPN 3 mL 0     Sig: Inject 2 mg into the skin every 7 days     Patient currently receives Ozempic from [employer] Patient Assistance program.  Phone call to the company and patient has been approved for participation in the program for 2023. Enrollment date is from today (01/10/2023) and medication can be expected to be received in the office within 30 calendar days. (Looking at beginning of February). Offer made by company to provide a voucher for a 30 day supply as patient will take last dose of Ozempic in current pen-tomorrow. Patient will need to fill voucher at local pharmacy. Patient's preference is [facility]. New Rx will need to be sent to the pharmacy and then the following information will need to be given to the pharmacist.  Did speak with Cira Bautista. Ozempic 2mg is unavailable. Uncertain when the medication will be available. BIN: [id number]   PCN: Gretchen Donis   Group: [id number]   Voucher/Member MS:[id number]  Expires: 1/31/2023       Spoke with daughter-in-law, Florlexa Raymond regarding available options. Decision made to call [facility] and see if they have it on the shelf. [facility] did have the 8mg/3ml pen (2mg weekly dose). Verbal order given to [facility] ([facility]) who was then able to process Rx with voucher resulting in $0 copay for the patient. Information shared with Yonathan Raymond who will plan to  from the pharmacy. Pharmacy hours are Mon-Fri (8am-6pm) and Sat-Sun (9am-3pm). Last Visit Date (If Applicable):  98/5/2223    Next Visit Date:    1/20/2023    Modesta العراقي D., [address] Medication Management Service  [phone]  1/10/2023  5:21 PM        =====================================================  For Pharmacy Admin Tracking Only    Program: Medical Group  CPA in place:  No  Recommendation Provided To: Patient/Caregiver: 1 via Telephone  Intervention Detail: Patient Access Assistance/Sample Provided  Intervention Accepted By: Patient/Caregiver: 1  Time Spent (min):  90

## 2023-01-11 DIAGNOSIS — M80.00XS OSTEOPOROSIS WITH CURRENT PATHOLOGICAL FRACTURE, UNSPECIFIED OSTEOPOROSIS TYPE, SEQUELA: Primary | ICD-10-CM

## 2023-01-11 RX ORDER — DENOSUMAB 60 MG/ML
60 INJECTION SUBCUTANEOUS ONCE
Qty: 1 ML | Refills: 0 | Status: SHIPPED | OUTPATIENT
Start: 2023-01-11 | End: 2023-01-11

## 2023-01-19 ENCOUNTER — HOSPITAL ENCOUNTER (OUTPATIENT)
Age: 83
Discharge: HOME | End: 2023-01-19
Payer: MEDICARE

## 2023-01-19 DIAGNOSIS — M80.00XS OSTEOPOROSIS WITH CURRENT PATHOLOGICAL FRACTURE, UNSPECIFIED OSTEOPOROSIS TYPE, SEQUELA: ICD-10-CM

## 2023-01-19 LAB
ANION GAP SERPL CALCULATED.3IONS-SCNC: 10 MMOL/L (ref 9–17)
BUN BLDV-MCNC: 13 MG/DL (ref 8–23)
CALCIUM SERPL-MCNC: 8.9 MG/DL (ref 8.6–10.4)
CHLORIDE BLD-SCNC: 95 MMOL/L (ref 98–107)
CO2: 28 MMOL/L (ref 20–31)
CREAT SERPL-MCNC: 0.55 MG/DL (ref 0.5–0.9)
GFR SERPL CREATININE-BSD FRML MDRD: >60 ML/MIN/1.73M2
GLUCOSE BLD-MCNC: 500 MG/DL (ref 70–99)
POTASSIUM SERPL-SCNC: 4.8 MMOL/L (ref 3.7–5.3)
SODIUM BLD-SCNC: 133 MMOL/L (ref 135–144)

## 2023-01-19 PROCEDURE — 36415 COLL VENOUS BLD VENIPUNCTURE: CPT

## 2023-01-19 PROCEDURE — 80048 BASIC METABOLIC PNL TOTAL CA: CPT

## 2023-01-20 ENCOUNTER — HOSPITAL ENCOUNTER (OUTPATIENT)
Age: 83
Setting detail: SPECIMEN
Discharge: HOME | End: 2023-01-20

## 2023-01-20 ENCOUNTER — OFFICE VISIT (OUTPATIENT)
Dept: FAMILY MEDICINE CLINIC | Age: 83
End: 2023-01-20

## 2023-01-20 VITALS
BODY MASS INDEX: 19.5 KG/M2 | SYSTOLIC BLOOD PRESSURE: 112 MMHG | HEART RATE: 96 BPM | TEMPERATURE: 96.8 F | DIASTOLIC BLOOD PRESSURE: 68 MMHG | WEIGHT: 103.2 LBS | OXYGEN SATURATION: 99 %

## 2023-01-20 DIAGNOSIS — E11.42 TYPE 2 DIABETES MELLITUS WITH DIABETIC POLYNEUROPATHY, WITH LONG-TERM CURRENT USE OF INSULIN (HCC): ICD-10-CM

## 2023-01-20 DIAGNOSIS — B37.89 CANDIDA RASH OF GROIN: ICD-10-CM

## 2023-01-20 DIAGNOSIS — Z79.4 TYPE 2 DIABETES MELLITUS WITH DIABETIC POLYNEUROPATHY, WITH LONG-TERM CURRENT USE OF INSULIN (HCC): Primary | ICD-10-CM

## 2023-01-20 DIAGNOSIS — E11.42 TYPE 2 DIABETES MELLITUS WITH DIABETIC POLYNEUROPATHY, WITH LONG-TERM CURRENT USE OF INSULIN (HCC): Primary | ICD-10-CM

## 2023-01-20 DIAGNOSIS — I10 PRIMARY HYPERTENSION: ICD-10-CM

## 2023-01-20 DIAGNOSIS — R23.3 EASY BRUISING: ICD-10-CM

## 2023-01-20 DIAGNOSIS — Z79.4 TYPE 2 DIABETES MELLITUS WITH DIABETIC POLYNEUROPATHY, WITH LONG-TERM CURRENT USE OF INSULIN (HCC): ICD-10-CM

## 2023-01-20 LAB
ABSOLUTE EOS #: <0.03 K/UL (ref 0–0.44)
ABSOLUTE IMMATURE GRANULOCYTE: <0.03 K/UL (ref 0–0.3)
ABSOLUTE LYMPH #: 2.69 K/UL (ref 1.1–3.7)
ABSOLUTE MONO #: 0.73 K/UL (ref 0.1–1.2)
BASOPHILS # BLD: 0 % (ref 0–2)
BASOPHILS ABSOLUTE: 0.03 K/UL (ref 0–0.2)
EOSINOPHILS RELATIVE PERCENT: 0 % (ref 1–4)
HCT VFR BLD CALC: 42.7 % (ref 36.3–47.1)
HEMOGLOBIN: 13.6 G/DL (ref 11.9–15.1)
IMMATURE GRANULOCYTES: 0 %
LYMPHOCYTES # BLD: 27 % (ref 24–43)
MCH RBC QN AUTO: 28.9 PG (ref 25.2–33.5)
MCHC RBC AUTO-ENTMCNC: 31.9 G/DL (ref 28.4–34.8)
MCV RBC AUTO: 90.7 FL (ref 82.6–102.9)
MONOCYTES # BLD: 7 % (ref 3–12)
NRBC AUTOMATED: 0 PER 100 WBC
PDW BLD-RTO: 15 % (ref 11.8–14.4)
PLATELET # BLD: 378 K/UL (ref 138–453)
PMV BLD AUTO: 10.6 FL (ref 8.1–13.5)
RBC # BLD: 4.71 M/UL (ref 3.95–5.11)
RBC # BLD: ABNORMAL 10*6/UL
SEG NEUTROPHILS: 66 % (ref 36–65)
SEGMENTED NEUTROPHILS ABSOLUTE COUNT: 6.46 K/UL (ref 1.5–8.1)
WBC # BLD: 10 K/UL (ref 3.5–11.3)

## 2023-01-20 RX ORDER — NYSTATIN 100000 [USP'U]/G
POWDER TOPICAL
Qty: 60 G | Refills: 1 | Status: SHIPPED | OUTPATIENT
Start: 2023-01-20

## 2023-01-20 ASSESSMENT — ENCOUNTER SYMPTOMS
NAUSEA: 0
COUGH: 0
ABDOMINAL PAIN: 0
COLOR CHANGE: 1
CONSTIPATION: 0
SHORTNESS OF BREATH: 0
WHEEZING: 0
EYE DISCHARGE: 0
SORE THROAT: 0
CHEST TIGHTNESS: 0
DIARRHEA: 0
VOMITING: 0

## 2023-01-20 ASSESSMENT — PATIENT HEALTH QUESTIONNAIRE - PHQ9
SUM OF ALL RESPONSES TO PHQ QUESTIONS 1-9: 0
SUM OF ALL RESPONSES TO PHQ QUESTIONS 1-9: 0
SUM OF ALL RESPONSES TO PHQ9 QUESTIONS 1 & 2: 0
SUM OF ALL RESPONSES TO PHQ QUESTIONS 1-9: 0
1. LITTLE INTEREST OR PLEASURE IN DOING THINGS: 0
2. FEELING DOWN, DEPRESSED OR HOPELESS: 0
SUM OF ALL RESPONSES TO PHQ QUESTIONS 1-9: 0

## 2023-01-20 NOTE — PROGRESS NOTES
Bong Machuca (:  1940) is a 81 y.o. female,Established patient, here for evaluation of the following chief complaint(s):  Diabetes          Subjective   SUBJECTIVE/OBJECTIVE:  Pt here today for DM2 f/u, accompanied by daughter  VSS, pt continuing to slowly lose weight    I received a notification from lab for a critical glucose on pt's most recent BMP draw for upcoming Prolia injection. Glucose was 500 on BMP, pt and her daughter report that her afternoon glucose checks have been averaging 300s w/ an occasional spike of 400/500. Pt's daughter covered her w/ a sliding scale of 16 units humalog at that time and recheck was under 200. I spoke w/ pt personally last night after notification of critical glucose and she was asymptomatic. Unable to check A1C in office, will send to lab for eval. Anticipating unchanged or worsened A1C w/ reports of significantly elevated glucose readings; if this is the case we will increase her daily Tresiba dose as pt has had hx of nocturnal/AM hypoglycemia episodes. Pt and daughter concerned w/ skin redness and odor noted under her skin fold on abdomen and groin last week. Educated this is related to candida and elevated glucose, no acute skin breakdown or infection on exam.     Daughter reports concerns w/ pt bruising more easily on her abdomen w/ recent subcutaneous injections; no longer taking Eliquis. Will update CBC for eval of platelets. Review of Systems   Constitutional:  Negative for activity change, appetite change, chills, fatigue, fever and unexpected weight change. HENT:  Negative for congestion, ear pain and sore throat. Eyes:  Negative for discharge and visual disturbance. Respiratory:  Negative for cough, chest tightness, shortness of breath and wheezing. Cardiovascular:  Negative for chest pain, palpitations and leg swelling. Gastrointestinal:  Negative for abdominal pain, constipation, diarrhea, nausea and vomiting.    Genitourinary: Negative for dysuria and pelvic pain. Musculoskeletal:  Negative for gait problem and neck pain. Skin:  Positive for color change and rash. Negative for wound. Neurological:  Positive for numbness. Negative for dizziness, seizures, syncope, weakness, light-headedness and headaches. Psychiatric/Behavioral:  Negative for behavioral problems and confusion. Objective   Physical Exam  Vitals and nursing note reviewed. Constitutional:       General: She is not in acute distress. Appearance: Normal appearance. She is well-developed. She is not ill-appearing, toxic-appearing or diaphoretic. HENT:      Head: Normocephalic and atraumatic. Right Ear: External ear normal.      Left Ear: External ear normal.      Nose: Nose normal.   Eyes:      General: No scleral icterus. Right eye: No discharge. Left eye: No discharge. Conjunctiva/sclera: Conjunctivae normal.      Pupils: Pupils are equal, round, and reactive to light. Neck:      Vascular: No carotid bruit. Trachea: No tracheal deviation. Cardiovascular:      Rate and Rhythm: Normal rate and regular rhythm. Heart sounds: Normal heart sounds. No murmur heard. No friction rub. No gallop. Pulmonary:      Effort: Pulmonary effort is normal. No tachypnea, accessory muscle usage or respiratory distress. Breath sounds: Normal breath sounds. No stridor. No decreased breath sounds, wheezing, rhonchi or rales. Abdominal:      Palpations: Abdomen is soft. Musculoskeletal:         General: No tenderness or deformity. Normal range of motion. Cervical back: Normal range of motion and neck supple. Right lower leg: No edema. Left lower leg: No edema. Skin:     General: Skin is warm and dry. Coloration: Skin is not pale. Findings: Bruising and rash present. No erythema.       Comments: Small area of redness under skin fold to abdomen, scattered well healing bruising to abdomen from previous injection sites   Neurological:      Mental Status: She is alert and oriented to person, place, and time. GCS: GCS eye subscore is 4. GCS verbal subscore is 5. GCS motor subscore is 6. Gait: Gait is intact. Gait normal.   Psychiatric:         Speech: Speech normal.         Behavior: Behavior normal.         Thought Content: Thought content normal.         Judgment: Judgment normal.               ASSESSMENT/PLAN:  1. Type 2 diabetes mellitus with diabetic polyneuropathy, with long-term current use of insulin ([address]  Assessment & Plan:   Unclear control, continue current medications pending work up below  -had long conversation about closer adherence to DM diet  -anticipate increase in [facility] if A1C worsening    Orders:  -     Hemoglobin A1C; Future    2. Primary hypertension  Assessment & Plan:   Well-controlled, continue current medications    3. Candida rash of groin  -related to elevated glucose  -Nystatin for relief  -encouraged to keep area clean and dry    -     nystatin (MYCOSTATIN) 989158 UNIT/GM powder; Apply 3 times daily. , Disp-60 g, R-1, Normal    4. Easy bruising  -no longer on anticoagulation, CBC for eval of platelets    -     CBC with Auto Differential; Future    Return in about 3 months (around 4/20/2023), or if symptoms worsen or fail to improve, for DM2. An electronic signature was used to authenticate this note.     --PA Miller - CNP

## 2023-01-22 LAB
ESTIMATED AVERAGE GLUCOSE: 243 MG/DL
HBA1C MFR BLD: 10.1 % (ref 4–6)

## 2023-01-23 ENCOUNTER — TELEPHONE (OUTPATIENT)
Dept: FAMILY MEDICINE CLINIC | Age: 83
End: 2023-01-23

## 2023-01-23 DIAGNOSIS — E11.42 TYPE 2 DIABETES MELLITUS WITH DIABETIC POLYNEUROPATHY, WITH LONG-TERM CURRENT USE OF INSULIN (HCC): ICD-10-CM

## 2023-01-23 DIAGNOSIS — Z79.4 TYPE 2 DIABETES MELLITUS WITH DIABETIC POLYNEUROPATHY, WITH LONG-TERM CURRENT USE OF INSULIN (HCC): ICD-10-CM

## 2023-01-23 DIAGNOSIS — B37.89 CANDIDA RASH OF GROIN: ICD-10-CM

## 2023-01-23 RX ORDER — NYSTATIN 100000 [USP'U]/G
POWDER TOPICAL
Qty: 60 G | Refills: 1 | Status: SHIPPED | OUTPATIENT
Start: 2023-01-23

## 2023-01-23 NOTE — TELEPHONE ENCOUNTER
Mariana Townsend is calling to request a refill on the following medication(s):    Medication Request:  Requested Prescriptions      No prescriptions requested or ordered in this encounter       Last Visit Date (If Applicable):  7/38/3461    Next Visit Date:    4/20/2023

## 2023-01-27 ENCOUNTER — TELEPHONE (OUTPATIENT)
Dept: FAMILY MEDICINE CLINIC | Age: 83
End: 2023-01-27

## 2023-01-27 NOTE — TELEPHONE ENCOUNTER
Medication Management Service ([address] [place] [address])  [facility]  [phone]     CLINICAL PHARMACY NOTE:      Phone call to [employer] to confirm shipping information for approved medications through the patient assistance program.  (Ozempic, [place], and pen needles). Patient is enrolled in the program through 12/31/2023. Shipment of Ozempic was received by office on 01/23/2023. [place] and pen needles anticipating shipment end of February 2023. No additional follow up is needed for the program at this time. Blessing Toussaint, Pharm. D., [address] [facility] Medication Management Service  [phone]  1/27/2023  10:22 AM      =======================================================  For Pharmacy Admin Tracking Only    Program: Medical Group  CPA in place:  No  Recommendation Provided To:  Other: 1  Intervention Detail: Patient Access Assistance/Sample Provided  Intervention Accepted By: Other: 1  Time Spent (min): 30

## 2023-01-31 ENCOUNTER — HOSPITAL ENCOUNTER (OUTPATIENT)
Dept: INFUSION THERAPY | Facility: MEDICAL CENTER | Age: 83
Discharge: HOME | End: 2023-01-31
Payer: MEDICARE

## 2023-01-31 VITALS
DIASTOLIC BLOOD PRESSURE: 62 MMHG | SYSTOLIC BLOOD PRESSURE: 124 MMHG | HEART RATE: 94 BPM | RESPIRATION RATE: 16 BRPM | TEMPERATURE: 99.2 F

## 2023-01-31 DIAGNOSIS — M80.00XS AGE-RELATED OSTEOPOROSIS WITH CURRENT PATHOLOGICAL FRACTURE, SEQUELA: Primary | ICD-10-CM

## 2023-01-31 PROCEDURE — 6360000002 HC RX W HCPCS

## 2023-01-31 PROCEDURE — 96372 THER/PROPH/DIAG INJ SC/IM: CPT

## 2023-01-31 RX ORDER — GABAPENTIN 300 MG/1
300 CAPSULE ORAL 3 TIMES DAILY
COMMUNITY

## 2023-01-31 RX ADMIN — DENOSUMAB 60 MG: 60 INJECTION SUBCUTANEOUS at 11:57

## 2023-01-31 NOTE — PROGRESS NOTES
PT ARRIVES PER WHEELCHAIR WITH VISITOR AND LABS FROM  1/19/23 REVIEWED AND PT DENIES ANY DENTAL ISSUES, STATES HAS DENTURES AND NO RUBBING OR IRRITATION. THIS IS PT'S FIRST PROLIA, ORDER REVIEWED AND GIVEN [employer] INFORMATION AND DISCUSSED IF ANY PROBLEMS WITH GUMS, SUCH AS ANY OPEN AREAS TO NOTIFY STAFF, DUE TO COULD CAUSE COMPLICATIONS, JAW BONE NECROSIS AND THAT CALCIUM LEVEL IN BLOOD MUST BE AT NORMAL LEVEL. VISITOR AND PT STATE UNDERSTANDING. PT DISCHARGED PER WHEELCHAIR WITH VISITOR AND NOTIFIED PROLIA IS A ONE TIME ORDER. NOTIFIED WILL NEED NEW ORDER AND THEN CAN SCHEDULE NEXT APPT, AT 6 MONTHS AND A DAY AFTER TODAY'S INJECTION, PT AND VISITOR STATE UNDERSTANDING.

## 2023-02-01 ENCOUNTER — TELEPHONE (OUTPATIENT)
Dept: ONCOLOGY | Age: 83
End: 2023-02-01

## 2023-02-01 NOTE — TELEPHONE ENCOUNTER
New order noted 1/19/23  Per Radha Felix APRN-CNP  Dx:osteoporosis  Prolia 60mg/ml SubQ x1 dose   Labs:BMP     MD VISIT 2/10/21 @ 1:30PM    Chart to  to be scanned and processed.

## 2023-02-13 DIAGNOSIS — E11.42 DIABETIC POLYNEUROPATHY ASSOCIATED WITH TYPE 2 DIABETES MELLITUS (HCC): ICD-10-CM

## 2023-02-13 DIAGNOSIS — Z79.4 TYPE 2 DIABETES MELLITUS WITH DIABETIC POLYNEUROPATHY, WITH LONG-TERM CURRENT USE OF INSULIN (HCC): ICD-10-CM

## 2023-02-13 DIAGNOSIS — E11.42 TYPE 2 DIABETES MELLITUS WITH DIABETIC POLYNEUROPATHY, WITH LONG-TERM CURRENT USE OF INSULIN (HCC): ICD-10-CM

## 2023-02-13 RX ORDER — GABAPENTIN 300 MG/1
300 CAPSULE ORAL 3 TIMES DAILY
Qty: 90 CAPSULE | Refills: 0 | Status: SHIPPED | OUTPATIENT
Start: 2023-02-13 | End: 2023-03-15

## 2023-02-13 RX ORDER — CALCIUM CITRATE/VITAMIN D3 200MG-6.25
TABLET ORAL
Qty: 150 EACH | Refills: 11 | Status: SHIPPED | OUTPATIENT
Start: 2023-02-13

## 2023-02-13 NOTE — TELEPHONE ENCOUNTER
Grisel Suarez is calling to request a refill on the following medication(s):    Last Visit Date (If Applicable):  8/03/5175    Next Visit Date:    4/20/2023    Medication Request:  Requested Prescriptions     Pending Prescriptions Disp Refills    gabapentin (NEURONTIN) 300 MG capsule 90 capsule 0     Sig: Take 1 capsule by mouth 3 times daily for 30 days.     TRUE METRIX BLOOD GLUCOSE TEST strip 150 each 11     Sig: To check blood sugars BID & PRN for s/s hypo / hyperglycemia

## 2023-02-28 DIAGNOSIS — E78.00 HYPERCHOLESTEROLEMIA: ICD-10-CM

## 2023-02-28 RX ORDER — ATORVASTATIN CALCIUM 10 MG/1
10 TABLET, FILM COATED ORAL DAILY
Qty: 90 TABLET | Refills: 0 | Status: SHIPPED | OUTPATIENT
Start: 2023-02-28

## 2023-02-28 NOTE — TELEPHONE ENCOUNTER
Efraín Rivas is calling to request a refill on the following medication(s):    Medication Request:  Requested Prescriptions     Pending Prescriptions Disp Refills    atorvastatin (LIPITOR) 10 MG tablet 90 tablet 0     Sig: Take 1 tablet by mouth daily       Last Visit Date (If Applicable):  5/90/6971    Next Visit Date:    4/20/2023

## 2023-03-03 ENCOUNTER — TELEPHONE (OUTPATIENT)
Dept: FAMILY MEDICINE CLINIC | Age: 83
End: 2023-03-03

## 2023-03-03 NOTE — TELEPHONE ENCOUNTER
Medication Management Service ([address] [place] [address])  [facility]  [phone]     CLINICAL PHARMACY NOTE:      Message received from office that patient took last dose of Ozempic and is wondering when her next shipment of [address] will be received. Noted that patient received a shipment on 01/23/2023. Only 1 Ozempic pen came with that shipment. Unclear why only pen was received-maybe due to supply shortage. Re-enrollment application did request shipments be as 120 day supplies with automatic refills. Phone call to [employer]. Patient's next shipment will begin processing on 04/11/2023. It could take up to 30 days before medication is shipped once processing has been started. No additional information could be obtained as to why patient only received 1 pen with last shipment. Representative thought it was the last shipment for 2022 and the computer would then calculate a shipment date which puts it at the middle of April. Explained to representative that patient will be without therapy for 12 weeks based on this filling cycle. Patient could call the company and request a voucher. Reached out to [employer] rep for market. Plan for office to receive two-2mg pen samples for patient next week (Monday 06/06/2023). [facility] requesting a return phone to [phone] to let patient know of plan to obtain samples. Edyta Rust, Pharm. D., [address] [facility] Medication Management Service  [phone]  3/3/2023  12:49 PM      ======================================================  For Pharmacy Admin Tracking Only    Program: Medical Group  CPA in place:  No  Recommendation Provided To:  Other: 1  Intervention Detail: Patient Access Assistance/Sample Provided  Time Spent (min): 60

## 2023-03-06 DIAGNOSIS — E11.42 TYPE 2 DIABETES MELLITUS WITH DIABETIC POLYNEUROPATHY, WITH LONG-TERM CURRENT USE OF INSULIN (HCC): Primary | ICD-10-CM

## 2023-03-06 DIAGNOSIS — Z79.4 TYPE 2 DIABETES MELLITUS WITH DIABETIC POLYNEUROPATHY, WITH LONG-TERM CURRENT USE OF INSULIN (HCC): Primary | ICD-10-CM

## 2023-03-06 RX ORDER — SEMAGLUTIDE 2.68 MG/ML
2 INJECTION, SOLUTION SUBCUTANEOUS
Qty: 6 ML | Refills: 0 | COMMUNITY
Start: 2023-03-06

## 2023-03-06 NOTE — TELEPHONE ENCOUNTER
Logan Hernandez is calling to request a refill on the following medication(s):    Medication Request:  Requested Prescriptions     Pending Prescriptions Disp Refills    Semaglutide, 2 MG/DOSE, (OZEMPIC, 2 MG/DOSE,) 8 MG/3ML SOPN 6 mL 0     Sig: Inject 2 mg into the skin every 7 days     Patient is currently enrolled in [employer] Patient assistance program for 2023. Shipment received was for 1 pen. Next shipment to begin processing on 04/11/2023. Based on current shipment times, would anticipate medication being received within 30 days. 2 samples received by office and set aside for patient. Patient notified that the two pens had been set aside for patient. Patient will plan to have daughter stop by this week to . Patient is due for next dose on Wednesday (03/08/2023).     Last Visit Date (If Applicable):  2/32/2822    Next Visit Date:    4/20/2023

## 2023-03-07 ENCOUNTER — TELEPHONE (OUTPATIENT)
Dept: FAMILY MEDICINE CLINIC | Age: 83
End: 2023-03-07

## 2023-03-07 NOTE — TELEPHONE ENCOUNTER
Medication Management Service ([address] [place] [address])  [facility]  [phone]     CLINICAL PHARMACY NOTE:      Phone call to [facility] [employer] Patient Assistance program to verify quantity of Ozempic shipment scheduled for process on 04/11/2023. Quantity confirmed for 4 pens. Plan for representative to follow up with supervisor to change processing date to today (03/07/2023). If approved then it may take up to 30 days for medication to be received. Will need to allow up to 48 hours for changes for changes in the automatic refill status for patient. Representative confirmed that the January 2023 shipment was for the last shipment of 2022 which is why patient received 1 pen due to supply/shipment delays. Davon Gross, Pharm. D., [address] [facility] Medication Management Service  [phone]  3/7/2023  3:07 PM    =========================================================  For Pharmacy Admin Tracking Only    Program: Medical Group  CPA in place:  No  Intervention Detail: Patient Access Assistance/Sample Provided  Time Spent (min): 30

## 2023-03-13 DIAGNOSIS — E11.42 DIABETIC POLYNEUROPATHY ASSOCIATED WITH TYPE 2 DIABETES MELLITUS (HCC): ICD-10-CM

## 2023-03-13 RX ORDER — GABAPENTIN 300 MG/1
300 CAPSULE ORAL 3 TIMES DAILY
Qty: 90 CAPSULE | Refills: 0 | Status: SHIPPED | OUTPATIENT
Start: 2023-03-13 | End: 2023-04-12

## 2023-03-13 NOTE — TELEPHONE ENCOUNTER
Bran Almeida is calling to request a refill on the following medication(s):    Medication Request:  Requested Prescriptions     Pending Prescriptions Disp Refills    gabapentin (NEURONTIN) 300 MG capsule 90 capsule 0     Sig: Take 1 capsule by mouth 3 times daily for 30 days.        Last Visit Date (If Applicable):  8/32/6005    Next Visit Date:    4/20/2023

## 2023-03-14 ENCOUNTER — TELEPHONE (OUTPATIENT)
Dept: FAMILY MEDICINE CLINIC | Age: 83
End: 2023-03-14

## 2023-03-14 NOTE — TELEPHONE ENCOUNTER
Patient's daughter in law called in to see if patient's son's Lacy Colin [facility] paperwork has been completed and ready to be sent in. They have 15 days from date it is issued to return paperwork.     Please advise

## 2023-03-20 ENCOUNTER — TELEPHONE (OUTPATIENT)
Dept: FAMILY MEDICINE CLINIC | Age: 83
End: 2023-03-20

## 2023-03-20 RX ORDER — SEMAGLUTIDE 2.68 MG/ML
2 INJECTION, SOLUTION SUBCUTANEOUS
Qty: 3 ML | Refills: 0 | Status: SHIPPED | OUTPATIENT
Start: 2023-03-20 | End: 2023-03-20 | Stop reason: SDUPTHER

## 2023-03-20 RX ORDER — SEMAGLUTIDE 2.68 MG/ML
2 INJECTION, SOLUTION SUBCUTANEOUS
Qty: 3 ML | Refills: 0 | Status: SHIPPED | OUTPATIENT
Start: 2023-03-20

## 2023-03-20 NOTE — TELEPHONE ENCOUNTER
PM      ================================================  For Pharmacy Admin Tracking Only    Program: Medical Group  CPA in place:  Yes  Recommendation Provided To: Patient/Caregiver: 1 via Telephone  Intervention Detail: Patient Access Assistance/Sample Provided  Intervention Accepted By: Patient/Caregiver: 1  Time Spent (min):  75

## 2023-03-23 ENCOUNTER — TELEPHONE (OUTPATIENT)
Dept: FAMILY MEDICINE CLINIC | Age: 83
End: 2023-03-23

## 2023-03-23 NOTE — TELEPHONE ENCOUNTER
Medication Management Service ([address] [facility])  [facility]  [phone]     CLINICAL PHARMACY NOTE:  Telephone    Patient receives Ozempic 2mg from [facility] [employer] Patient Assistance Program. Due to shipping delays, Lore provided a voucher for Jim Marla to be used at a pharmacy for no charge. Voucher faxed to [facility]) on Monday, 03/20/2023. Pt attempted to  Ozempic at [facility] on [address], was told there would be a $47 copay. Pt did not purchase the medication. Called [facility] on [address] ([phone]) learned that the pt's [employer] insurance was billed for the Jim Marla. Voucher had not been applied. Pharmacy re-ran the claim utilizing voucher information resulting in no co-pay for patient. Contacted pt, who handed off phone to daughter, to shared the above information. Daughter verbalized understanding and had no other questions. Maurilio Preciado  2023 PharmD Candidate  [facility]) Medication Management Service  [phone]  3/23/2023  10:31 AM    I have discussed the care of this patient with the student. I agree with the assessment and plan as documented by the student. Spoke with PA Clancy CNP regarding above and plan for patient to  the 30 day supply from [facility]. Malik Wilder, Pharm. D., [address] [facility] Medication Management Service  [phone]  3/24/2023  5:40 PM    ====================================================  For Pharmacy Admin Tracking Only    Program: Medical Group  CPA in place:  No  Recommendation Provided To: Patient/Caregiver: 1 via Telephone and Pharmacy: 1  Intervention Detail: Patient Access Assistance/Sample Provided  Intervention Accepted By: Patient/Caregiver: 1 and Pharmacy: 1  Time Spent (min): 60

## 2023-03-30 NOTE — TELEPHONE ENCOUNTER
Alcon Wang is calling to request a refill on the following medication(s):    Medication Request:  Requested Prescriptions     Pending Prescriptions Disp Refills    gabapentin (NEURONTIN) 300 MG capsule [Pharmacy Med Name: GABAPENTIN 300 MG Capsule] 180 capsule 0     Sig: TAKE 1 CAPSULE THREE TIMES DAILY       Last Visit Date (If Applicable):  0/3/0435 In office    Next Visit Date:    Visit date not found Sub-Acute rehab

## 2023-04-19 DIAGNOSIS — Z79.4 TYPE 2 DIABETES MELLITUS WITH DIABETIC POLYNEUROPATHY, WITH LONG-TERM CURRENT USE OF INSULIN (HCC): Primary | ICD-10-CM

## 2023-04-19 DIAGNOSIS — E11.42 TYPE 2 DIABETES MELLITUS WITH DIABETIC POLYNEUROPATHY, WITH LONG-TERM CURRENT USE OF INSULIN (HCC): Primary | ICD-10-CM

## 2023-04-19 RX ORDER — ISOPROPYL ALCOHOL 0.75 G/1
1 SWAB TOPICAL
Qty: 400 EACH | Refills: 0 | Status: SHIPPED | OUTPATIENT
Start: 2023-04-19

## 2023-04-20 ENCOUNTER — OFFICE VISIT (OUTPATIENT)
Dept: FAMILY MEDICINE CLINIC | Age: 83
End: 2023-04-20
Payer: MEDICARE

## 2023-04-20 VITALS
HEART RATE: 93 BPM | OXYGEN SATURATION: 96 % | WEIGHT: 100 LBS | BODY MASS INDEX: 18.89 KG/M2 | SYSTOLIC BLOOD PRESSURE: 128 MMHG | DIASTOLIC BLOOD PRESSURE: 64 MMHG | TEMPERATURE: 97.8 F

## 2023-04-20 DIAGNOSIS — I10 PRIMARY HYPERTENSION: ICD-10-CM

## 2023-04-20 DIAGNOSIS — E78.5 DYSLIPIDEMIA: ICD-10-CM

## 2023-04-20 DIAGNOSIS — E11.42 TYPE 2 DIABETES MELLITUS WITH DIABETIC POLYNEUROPATHY, WITH LONG-TERM CURRENT USE OF INSULIN (HCC): Primary | ICD-10-CM

## 2023-04-20 DIAGNOSIS — Z79.4 TYPE 2 DIABETES MELLITUS WITH DIABETIC POLYNEUROPATHY, WITH LONG-TERM CURRENT USE OF INSULIN (HCC): Primary | ICD-10-CM

## 2023-04-20 LAB — HBA1C MFR BLD: 9.2 %

## 2023-04-20 PROCEDURE — 3046F HEMOGLOBIN A1C LEVEL >9.0%: CPT

## 2023-04-20 PROCEDURE — 99214 OFFICE O/P EST MOD 30 MIN: CPT

## 2023-04-20 PROCEDURE — G8399 PT W/DXA RESULTS DOCUMENT: HCPCS

## 2023-04-20 PROCEDURE — 1123F ACP DISCUSS/DSCN MKR DOCD: CPT

## 2023-04-20 PROCEDURE — G8420 CALC BMI NORM PARAMETERS: HCPCS

## 2023-04-20 PROCEDURE — G8427 DOCREV CUR MEDS BY ELIG CLIN: HCPCS

## 2023-04-20 PROCEDURE — 83036 HEMOGLOBIN GLYCOSYLATED A1C: CPT

## 2023-04-20 PROCEDURE — 3078F DIAST BP <80 MM HG: CPT

## 2023-04-20 PROCEDURE — 3074F SYST BP LT 130 MM HG: CPT

## 2023-04-20 PROCEDURE — 1036F TOBACCO NON-USER: CPT

## 2023-04-20 PROCEDURE — 1090F PRES/ABSN URINE INCON ASSESS: CPT

## 2023-04-20 SDOH — ECONOMIC STABILITY: INCOME INSECURITY: HOW HARD IS IT FOR YOU TO PAY FOR THE VERY BASICS LIKE FOOD, HOUSING, MEDICAL CARE, AND HEATING?: NOT HARD AT ALL

## 2023-04-20 SDOH — ECONOMIC STABILITY: FOOD INSECURITY: WITHIN THE PAST 12 MONTHS, YOU WORRIED THAT YOUR FOOD WOULD RUN OUT BEFORE YOU GOT MONEY TO BUY MORE.: NEVER TRUE

## 2023-04-20 SDOH — ECONOMIC STABILITY: HOUSING INSECURITY
IN THE LAST 12 MONTHS, WAS THERE A TIME WHEN YOU DID NOT HAVE A STEADY PLACE TO SLEEP OR SLEPT IN A SHELTER (INCLUDING NOW)?: NO

## 2023-04-20 SDOH — ECONOMIC STABILITY: FOOD INSECURITY: WITHIN THE PAST 12 MONTHS, THE FOOD YOU BOUGHT JUST DIDN'T LAST AND YOU DIDN'T HAVE MONEY TO GET MORE.: NEVER TRUE

## 2023-04-20 ASSESSMENT — ENCOUNTER SYMPTOMS
SORE THROAT: 0
CONSTIPATION: 0
COLOR CHANGE: 0
NAUSEA: 0
ABDOMINAL PAIN: 0
DIARRHEA: 0
VOMITING: 0
WHEEZING: 0
CHEST TIGHTNESS: 0
SHORTNESS OF BREATH: 0
COUGH: 0
EYE DISCHARGE: 0

## 2023-04-20 NOTE — PROGRESS NOTES
Maru Rizzo (:  1940) is a 81 y.o. female,Established patient, here for evaluation of the following chief complaint(s):  Diabetes          Subjective   SUBJECTIVE/OBJECTIVE:  Pt here today for DM2 f/u, accompanied by daughter  MATT    Pt's A1C slightly improved from 10.2 to 9.2. She continues to tolerate chronic meds well, denies further episodes of hypoglycemia. Continued neuropathy sx to bilateral hands. Pt continues to not follow a diabetic diet closely but family does a fair job at helping remind her of more appropriate choices. Review of Systems   Constitutional:  Negative for activity change, appetite change, chills, fatigue, fever and unexpected weight change. HENT:  Negative for congestion, ear pain and sore throat. Eyes:  Negative for discharge and visual disturbance. Respiratory:  Negative for cough, chest tightness, shortness of breath and wheezing. Cardiovascular:  Negative for chest pain, palpitations and leg swelling. Gastrointestinal:  Negative for abdominal pain, constipation, diarrhea, nausea and vomiting. Genitourinary:  Negative for dysuria and pelvic pain. Musculoskeletal:  Positive for arthralgias. Negative for gait problem and neck pain. Skin:  Negative for color change, rash and wound. Neurological:  Positive for numbness. Negative for dizziness, seizures, syncope, weakness, light-headedness and headaches. Psychiatric/Behavioral:  Negative for behavioral problems, confusion and sleep disturbance. Objective   Physical Exam  Vitals and nursing note reviewed. Constitutional:       General: She is not in acute distress. Appearance: Normal appearance. She is well-developed. She is not ill-appearing, toxic-appearing or diaphoretic. HENT:      Head: Normocephalic and atraumatic. Right Ear: External ear normal.      Left Ear: External ear normal.      Nose: Nose normal.   Eyes:      General: No scleral icterus.         Right eye: No

## 2023-04-21 ENCOUNTER — TELEPHONE (OUTPATIENT)
Dept: FAMILY MEDICINE CLINIC | Age: 83
End: 2023-04-21

## 2023-04-24 ENCOUNTER — TELEPHONE (OUTPATIENT)
Dept: FAMILY MEDICINE CLINIC | Age: 83
End: 2023-04-24

## 2023-04-24 NOTE — TELEPHONE ENCOUNTER
Patient called today to let you know that [place] is too expensive and wants to know if she can get it through her patient assistance program.  Patient would also like to see if she can get some samples  Patient does have Ozempic to  and would like to  the samples of Jardiance at the same time    Please advise

## 2023-05-04 ENCOUNTER — TELEPHONE (OUTPATIENT)
Dept: FAMILY MEDICINE CLINIC | Age: 83
End: 2023-05-04

## 2023-05-10 DIAGNOSIS — E78.00 HYPERCHOLESTEROLEMIA: ICD-10-CM

## 2023-05-11 RX ORDER — ATORVASTATIN CALCIUM 10 MG/1
10 TABLET, FILM COATED ORAL DAILY
Qty: 90 TABLET | Refills: 0 | Status: SHIPPED | OUTPATIENT
Start: 2023-05-11

## 2023-05-11 NOTE — TELEPHONE ENCOUNTER
Irais Fonseca is calling to request a refill on the following medication(s):    Medication Request:  Requested Prescriptions     Pending Prescriptions Disp Refills    atorvastatin (LIPITOR) 10 MG tablet [Pharmacy Med Name: ATORVASTATIN 10MG TABLETS] 90 tablet 0     Sig: TAKE 1 TABLET BY MOUTH DAILY       Last Visit Date (If Applicable):  8/15/3999    Next Visit Date:    7/20/2023

## 2023-05-16 DIAGNOSIS — F41.9 ANXIETY: ICD-10-CM

## 2023-05-16 RX ORDER — ATORVASTATIN CALCIUM 10 MG/1
10 TABLET, FILM COATED ORAL DAILY
Qty: 90 TABLET | Refills: 0 | OUTPATIENT
Start: 2023-05-16

## 2023-05-16 RX ORDER — VENLAFAXINE HYDROCHLORIDE 37.5 MG/1
37.5 CAPSULE, EXTENDED RELEASE ORAL DAILY
Qty: 90 CAPSULE | Refills: 1 | Status: SHIPPED | OUTPATIENT
Start: 2023-05-16

## 2023-05-16 NOTE — TELEPHONE ENCOUNTER
Shay Calhoun is calling to request a refill on the following medication(s):    Medication Request:  Requested Prescriptions     Pending Prescriptions Disp Refills    atorvastatin (LIPITOR) 10 MG tablet 90 tablet 0     Sig: Take 1 tablet by mouth daily    venlafaxine (EFFEXOR XR) 37.5 MG extended release capsule 90 capsule 1     Sig: Take 1 capsule by mouth daily       Last Visit Date (If Applicable):  2/51/2053    Next Visit Date:    7/20/2023

## 2023-06-21 DIAGNOSIS — E11.42 DIABETIC POLYNEUROPATHY ASSOCIATED WITH TYPE 2 DIABETES MELLITUS (HCC): ICD-10-CM

## 2023-06-21 RX ORDER — GABAPENTIN 300 MG/1
CAPSULE ORAL
Qty: 90 CAPSULE | Refills: 0 | Status: SHIPPED | OUTPATIENT
Start: 2023-06-21 | End: 2023-07-21

## 2023-06-21 NOTE — TELEPHONE ENCOUNTER
Lou Agosto is calling to request a refill on the following medication(s):    Last Visit Date (If Applicable):  8/55/7612    Next Visit Date:    7/20/2023    Medication Request:  Requested Prescriptions     Pending Prescriptions Disp Refills    gabapentin (NEURONTIN) 300 MG capsule [Pharmacy Med Name: GABAPENTIN 300 MG Capsule] 90 capsule 0     Sig: TAKE 1 CAPSULE THREE TIMES DAILY

## 2023-07-05 DIAGNOSIS — E11.42 TYPE 2 DIABETES MELLITUS WITH DIABETIC POLYNEUROPATHY, WITH LONG-TERM CURRENT USE OF INSULIN (HCC): ICD-10-CM

## 2023-07-05 DIAGNOSIS — Z79.4 TYPE 2 DIABETES MELLITUS WITH DIABETIC POLYNEUROPATHY, WITH LONG-TERM CURRENT USE OF INSULIN (HCC): ICD-10-CM

## 2023-07-06 RX ORDER — ISOPROPYL ALCOHOL 70 ML/100ML
SWAB TOPICAL
Qty: 400 EACH | Refills: 0 | Status: SHIPPED | OUTPATIENT
Start: 2023-07-06

## 2023-07-06 NOTE — TELEPHONE ENCOUNTER
Drake Trevino is calling to request a refill on the following medication(s):    Medication Request:  Requested Prescriptions     Pending Prescriptions Disp Refills    Alcohol Swabs (DROPSAFE ALCOHOL PREP) 70 % PADS [Pharmacy Med Name: DROPSAFE ALCOHOL PREP PADS 70 % Pad] 400 each 0     Sig: USE 4 TIMES DAILY (BEFORE MEALS AND NIGHTLY)       Last Visit Date (If Applicable):  1/06/8447    Next Visit Date:    7/20/2023

## 2023-07-19 DIAGNOSIS — E78.00 HYPERCHOLESTEROLEMIA: ICD-10-CM

## 2023-07-19 DIAGNOSIS — F41.9 ANXIETY: ICD-10-CM

## 2023-07-19 LAB
ALBUMIN SERPL-MCNC: 4.3 G/DL
ALP BLD-CCNC: 175 U/L
ALT SERPL-CCNC: 28 U/L
ANION GAP SERPL CALCULATED.3IONS-SCNC: 10 MMOL/L
AST SERPL-CCNC: 32 U/L
AVERAGE GLUCOSE: 200
BILIRUB SERPL-MCNC: 0.5 MG/DL (ref 0.1–1.4)
BUN BLDV-MCNC: 13 MG/DL
CALCIUM SERPL-MCNC: 9.4 MG/DL
CHLORIDE BLD-SCNC: 101 MMOL/L
CHOLESTEROL, FASTING: 154
CO2: 101 MMOL/L
CREAT SERPL-MCNC: 0.53 MG/DL
EGFR: 92
GLUCOSE BLD-MCNC: 97 MG/DL
HBA1C MFR BLD: 8.6 %
HDLC SERPL-MCNC: 92 MG/DL (ref 35–70)
LDL CHOLESTEROL CALCULATED: 47 MG/DL (ref 0–160)
POTASSIUM SERPL-SCNC: 4.7 MMOL/L
SODIUM BLD-SCNC: 141 MMOL/L
TOTAL PROTEIN: 7.1
TRIGLYCERIDE, FASTING: 73

## 2023-07-19 RX ORDER — ATORVASTATIN CALCIUM 10 MG/1
10 TABLET, FILM COATED ORAL DAILY
Qty: 90 TABLET | Refills: 0 | Status: SHIPPED | OUTPATIENT
Start: 2023-07-19

## 2023-07-19 RX ORDER — VENLAFAXINE HYDROCHLORIDE 37.5 MG/1
37.5 CAPSULE, EXTENDED RELEASE ORAL DAILY
Qty: 90 CAPSULE | Refills: 1 | Status: SHIPPED | OUTPATIENT
Start: 2023-07-19 | End: 2023-07-21

## 2023-07-19 NOTE — TELEPHONE ENCOUNTER
Zack Granger is calling to request a refill on the following medication(s):    Medication Request:  Requested Prescriptions     Pending Prescriptions Disp Refills    atorvastatin (LIPITOR) 10 MG tablet 90 tablet 0     Sig: Take 1 tablet by mouth daily    venlafaxine (EFFEXOR XR) 37.5 MG extended release capsule 90 capsule 1     Sig: Take 1 capsule by mouth daily       Last Visit Date (If Applicable):  4/37/5138    Next Visit Date:    7/21/2023

## 2023-07-20 DIAGNOSIS — I10 PRIMARY HYPERTENSION: ICD-10-CM

## 2023-07-20 DIAGNOSIS — Z79.4 TYPE 2 DIABETES MELLITUS WITH DIABETIC POLYNEUROPATHY, WITH LONG-TERM CURRENT USE OF INSULIN (HCC): ICD-10-CM

## 2023-07-20 DIAGNOSIS — E11.42 TYPE 2 DIABETES MELLITUS WITH DIABETIC POLYNEUROPATHY, WITH LONG-TERM CURRENT USE OF INSULIN (HCC): ICD-10-CM

## 2023-07-20 DIAGNOSIS — E78.5 DYSLIPIDEMIA: ICD-10-CM

## 2023-07-21 ENCOUNTER — OFFICE VISIT (OUTPATIENT)
Dept: FAMILY MEDICINE CLINIC | Age: 83
End: 2023-07-21

## 2023-07-21 VITALS
TEMPERATURE: 97.4 F | BODY MASS INDEX: 18.52 KG/M2 | WEIGHT: 98 LBS | DIASTOLIC BLOOD PRESSURE: 60 MMHG | OXYGEN SATURATION: 98 % | SYSTOLIC BLOOD PRESSURE: 118 MMHG | HEART RATE: 92 BPM

## 2023-07-21 DIAGNOSIS — F41.9 ANXIETY: ICD-10-CM

## 2023-07-21 DIAGNOSIS — E11.42 TYPE 2 DIABETES MELLITUS WITH DIABETIC POLYNEUROPATHY, WITH LONG-TERM CURRENT USE OF INSULIN (HCC): Primary | ICD-10-CM

## 2023-07-21 DIAGNOSIS — R29.6 FALLS FREQUENTLY: ICD-10-CM

## 2023-07-21 DIAGNOSIS — M80.00XD AGE-RELATED OSTEOPOROSIS WITH CURRENT PATHOLOGICAL FRACTURE WITH ROUTINE HEALING, SUBSEQUENT ENCOUNTER: ICD-10-CM

## 2023-07-21 DIAGNOSIS — J44.9 CHRONIC OBSTRUCTIVE PULMONARY DISEASE, UNSPECIFIED COPD TYPE (HCC): ICD-10-CM

## 2023-07-21 DIAGNOSIS — Z79.4 TYPE 2 DIABETES MELLITUS WITH DIABETIC POLYNEUROPATHY, WITH LONG-TERM CURRENT USE OF INSULIN (HCC): Primary | ICD-10-CM

## 2023-07-21 DIAGNOSIS — I10 PRIMARY HYPERTENSION: ICD-10-CM

## 2023-07-21 PROBLEM — C24.1 AMPULLARY CARCINOMA (HCC): Status: RESOLVED | Noted: 2020-06-25 | Resolved: 2023-07-21

## 2023-07-21 RX ORDER — VENLAFAXINE HYDROCHLORIDE 37.5 MG/1
37.5 CAPSULE, EXTENDED RELEASE ORAL DAILY
Qty: 90 CAPSULE | Refills: 1
Start: 2023-07-21

## 2023-07-21 RX ORDER — ROMOSOZUMAB-AQQG 105 MG/1.17ML
210 INJECTION, SOLUTION SUBCUTANEOUS ONCE
Qty: 2.24 ML | Refills: 0
Start: 2023-07-21 | End: 2023-07-21

## 2023-07-21 SDOH — ECONOMIC STABILITY: FOOD INSECURITY: WITHIN THE PAST 12 MONTHS, YOU WORRIED THAT YOUR FOOD WOULD RUN OUT BEFORE YOU GOT MONEY TO BUY MORE.: NEVER TRUE

## 2023-07-21 SDOH — ECONOMIC STABILITY: FOOD INSECURITY: WITHIN THE PAST 12 MONTHS, THE FOOD YOU BOUGHT JUST DIDN'T LAST AND YOU DIDN'T HAVE MONEY TO GET MORE.: NEVER TRUE

## 2023-07-21 SDOH — ECONOMIC STABILITY: INCOME INSECURITY: HOW HARD IS IT FOR YOU TO PAY FOR THE VERY BASICS LIKE FOOD, HOUSING, MEDICAL CARE, AND HEATING?: NOT HARD AT ALL

## 2023-07-21 ASSESSMENT — ENCOUNTER SYMPTOMS
SHORTNESS OF BREATH: 0
WHEEZING: 0
DIARRHEA: 0
COLOR CHANGE: 0
VOMITING: 0
EYE DISCHARGE: 0
ABDOMINAL PAIN: 0
NAUSEA: 0
COUGH: 0
CONSTIPATION: 0
SORE THROAT: 0
CHEST TIGHTNESS: 0

## 2023-07-21 ASSESSMENT — PATIENT HEALTH QUESTIONNAIRE - PHQ9
SUM OF ALL RESPONSES TO PHQ QUESTIONS 1-9: 0
SUM OF ALL RESPONSES TO PHQ QUESTIONS 1-9: 0
1. LITTLE INTEREST OR PLEASURE IN DOING THINGS: 0
SUM OF ALL RESPONSES TO PHQ QUESTIONS 1-9: 0
SUM OF ALL RESPONSES TO PHQ QUESTIONS 1-9: 0
2. FEELING DOWN, DEPRESSED OR HOPELESS: 0
SUM OF ALL RESPONSES TO PHQ9 QUESTIONS 1 & 2: 0

## 2023-07-21 NOTE — PROGRESS NOTES
Appearance: Normal appearance. She is well-developed. She is not ill-appearing, toxic-appearing or diaphoretic. HENT:      Head: Normocephalic and atraumatic. Right Ear: External ear normal.      Left Ear: External ear normal.      Nose: Nose normal.   Eyes:      General: No scleral icterus. Right eye: No discharge. Left eye: No discharge. Conjunctiva/sclera: Conjunctivae normal.      Pupils: Pupils are equal, round, and reactive to light. Neck:      Vascular: No carotid bruit. Trachea: No tracheal deviation. Cardiovascular:      Rate and Rhythm: Normal rate and regular rhythm. Heart sounds: Normal heart sounds. No murmur heard. No friction rub. No gallop. Pulmonary:      Effort: Pulmonary effort is normal. No tachypnea, accessory muscle usage or respiratory distress. Breath sounds: Normal breath sounds. No stridor. No decreased breath sounds, wheezing, rhonchi or rales. Abdominal:      Palpations: Abdomen is soft. Musculoskeletal:         General: No deformity. Normal range of motion. Right upper arm: Tenderness present. Cervical back: Normal range of motion and neck supple. Right lower leg: No edema. Left lower leg: No edema. Skin:     General: Skin is warm and dry. Coloration: Skin is not pale. Findings: No erythema or rash. Neurological:      Mental Status: She is alert and oriented to person, place, and time. GCS: GCS eye subscore is 4. GCS verbal subscore is 5. GCS motor subscore is 6. Gait: Gait is intact. Gait normal.   Psychiatric:         Speech: Speech normal.         Behavior: Behavior normal.         Thought Content: Thought content normal.         Judgment: Judgment normal.               ASSESSMENT/PLAN:  1.  Type 2 diabetes mellitus with diabetic polyneuropathy, with long-term current use of insulin ([facility])  -improving  -d/t continued lows will decrease Tresiba to 10 units in the AM  -continue weight loss

## 2023-07-31 ENCOUNTER — TELEPHONE (OUTPATIENT)
Dept: FAMILY MEDICINE CLINIC | Age: 83
End: 2023-07-31

## 2023-07-31 DIAGNOSIS — F41.9 ANXIETY: Primary | ICD-10-CM

## 2023-07-31 RX ORDER — SERTRALINE HYDROCHLORIDE 25 MG/1
25 TABLET, FILM COATED ORAL DAILY
Qty: 30 TABLET | Refills: 0 | Status: SHIPPED | OUTPATIENT
Start: 2023-07-31

## 2023-07-31 NOTE — TELEPHONE ENCOUNTER
Patient called fiona she is had a reaction after her dosage of venlafaxine er 37.5 mg was changed. She was dizzy and full of anxiety last night. Her sugar was okay, but was instructed to report any reaction and her meds may change again. Please call [phone] or her daughter Karen Maddox at [phone].

## 2023-08-18 DIAGNOSIS — F41.9 ANXIETY: ICD-10-CM

## 2023-08-18 RX ORDER — SERTRALINE HYDROCHLORIDE 25 MG/1
25 TABLET, FILM COATED ORAL DAILY
Qty: 90 TABLET | Refills: 0 | Status: SHIPPED | OUTPATIENT
Start: 2023-08-18

## 2023-08-18 NOTE — TELEPHONE ENCOUNTER
Patient asking for 90 day supply        Autumn Gamez is calling to request a refill on the following medication(s):    Medication Request:  Requested Prescriptions     Pending Prescriptions Disp Refills    sertraline (ZOLOFT) 25 MG tablet 30 tablet 0     Sig: Take 1 tablet by mouth daily       Last Visit Date (If Applicable):  1/37/0868    Next Visit Date:    10/23/2023

## 2023-08-28 DIAGNOSIS — E11.42 DIABETIC POLYNEUROPATHY ASSOCIATED WITH TYPE 2 DIABETES MELLITUS (HCC): ICD-10-CM

## 2023-08-28 RX ORDER — GABAPENTIN 300 MG/1
CAPSULE ORAL
Qty: 90 CAPSULE | Refills: 0 | Status: SHIPPED | OUTPATIENT
Start: 2023-08-28 | End: 2023-09-27

## 2023-08-28 NOTE — TELEPHONE ENCOUNTER
Yusuf Bonilla is calling to request a refill on the following medication(s):    Medication Request:  Requested Prescriptions     Pending Prescriptions Disp Refills    gabapentin (NEURONTIN) 300 MG capsule [Pharmacy Med Name: GABAPENTIN 300 MG Capsule] 90 capsule 0     Sig: TAKE 1 CAPSULE THREE TIMES DAILY       Last Visit Date (If Applicable):  2/13/0633    Next Visit Date:    10/23/2023

## 2023-08-29 DIAGNOSIS — E11.42 TYPE 2 DIABETES MELLITUS WITH DIABETIC POLYNEUROPATHY, WITH LONG-TERM CURRENT USE OF INSULIN (HCC): ICD-10-CM

## 2023-08-29 DIAGNOSIS — Z79.4 TYPE 2 DIABETES MELLITUS WITH DIABETIC POLYNEUROPATHY, WITH LONG-TERM CURRENT USE OF INSULIN (HCC): ICD-10-CM

## 2023-08-29 NOTE — TELEPHONE ENCOUNTER
Autumn Gamez is calling to request a refill on the following medication(s):    Medication Request:  Requested Prescriptions     Pending Prescriptions Disp Refills    Insulin Degludec 200 UNIT/ML SOPN 9 mL 2     Sig: Inject 10 Units into the skin daily       Last Visit Date (If Applicable):  9/61/9950    Next Visit Date:    10/23/2023

## 2023-09-15 DIAGNOSIS — M80.00XD AGE-RELATED OSTEOPOROSIS WITH CURRENT PATHOLOGICAL FRACTURE WITH ROUTINE HEALING, SUBSEQUENT ENCOUNTER: ICD-10-CM

## 2023-09-15 DIAGNOSIS — M80.00XD AGE-RELATED OSTEOPOROSIS WITH CURRENT PATHOLOGICAL FRACTURE WITH ROUTINE HEALING, SUBSEQUENT ENCOUNTER: Primary | ICD-10-CM

## 2023-09-15 RX ORDER — ROMOSOZUMAB-AQQG 105 MG/1.17ML
210 INJECTION, SOLUTION SUBCUTANEOUS ONCE
Qty: 2.24 ML | Refills: 0 | Status: SHIPPED | OUTPATIENT
Start: 2023-09-15 | End: 2023-09-15

## 2023-09-18 ENCOUNTER — TELEPHONE (OUTPATIENT)
Dept: ONCOLOGY | Age: 83
End: 2023-09-18

## 2023-09-18 ENCOUNTER — TELEPHONE (OUTPATIENT)
Dept: FAMILY MEDICINE CLINIC | Age: 83
End: 2023-09-18

## 2023-09-18 NOTE — TELEPHONE ENCOUNTER
New order noted 9/15/23  Per Lourena Gaucher APRN-CNP  Dx:osteoporosis  Evenity 105MG/1.17ml   Labs:BMP       Lab order scanned into chart      order to  to be scanned into chart

## 2023-09-21 ENCOUNTER — TELEPHONE (OUTPATIENT)
Dept: INFUSION THERAPY | Facility: MEDICAL CENTER | Age: 83
End: 2023-09-21

## 2023-09-21 NOTE — TELEPHONE ENCOUNTER
Murray called about  P. A. for [facility]  Ref [address] #[id number]     [address] [place]. [phone]

## 2023-09-29 ENCOUNTER — TELEPHONE (OUTPATIENT)
Dept: ONCOLOGY | Age: 83
End: 2023-09-29

## 2023-09-29 NOTE — TELEPHONE ENCOUNTER
I RECEIVED A CALL FROM NP STATING THAT THE INSURANCE HAS DENIED AISH[employer]'S EVENITY. PER DENIAL INSURANCE WANTS PT TO TRY OTHER DRUGS. NP WAS ASKING IF WE USE THESE HERE. I TOLD HER I NEVER HEARD OF THEM AND SHE STATED SHE HAS NOT EITHER.  NP TO DO APPEAL AND IF APPROVED I WILL CALL PT TO GET SCHEDULED FOR HER TX.

## 2023-10-01 DIAGNOSIS — E78.00 HYPERCHOLESTEROLEMIA: ICD-10-CM

## 2023-10-02 RX ORDER — ATORVASTATIN CALCIUM 10 MG/1
10 TABLET, FILM COATED ORAL DAILY
Qty: 90 TABLET | Refills: 0 | Status: SHIPPED | OUTPATIENT
Start: 2023-10-02

## 2023-10-02 NOTE — TELEPHONE ENCOUNTER
Colletta Chamber is calling to request a refill on the following medication(s):    Medication Request:  Requested Prescriptions     Pending Prescriptions Disp Refills    atorvastatin (LIPITOR) 10 MG tablet [Pharmacy Med Name: ATORVASTATIN CALCIUM 10 MG Tablet] 90 tablet 0     Sig: TAKE 1 TABLET EVERY DAY       Last Visit Date (If Applicable):  5/27/8713    Next Visit Date:    10/23/2023

## 2023-10-10 ENCOUNTER — OFFICE VISIT (OUTPATIENT)
Age: 83
End: 2023-10-10
Payer: MEDICARE

## 2023-10-10 DIAGNOSIS — M89.8X2 PAIN OF RIGHT HUMERUS: Primary | ICD-10-CM

## 2023-10-10 PROCEDURE — 1036F TOBACCO NON-USER: CPT | Performed by: ORTHOPAEDIC SURGERY

## 2023-10-10 PROCEDURE — G8399 PT W/DXA RESULTS DOCUMENT: HCPCS | Performed by: ORTHOPAEDIC SURGERY

## 2023-10-10 PROCEDURE — G8420 CALC BMI NORM PARAMETERS: HCPCS | Performed by: ORTHOPAEDIC SURGERY

## 2023-10-10 PROCEDURE — G8428 CUR MEDS NOT DOCUMENT: HCPCS | Performed by: ORTHOPAEDIC SURGERY

## 2023-10-10 PROCEDURE — 1123F ACP DISCUSS/DSCN MKR DOCD: CPT | Performed by: ORTHOPAEDIC SURGERY

## 2023-10-10 PROCEDURE — 1090F PRES/ABSN URINE INCON ASSESS: CPT | Performed by: ORTHOPAEDIC SURGERY

## 2023-10-10 PROCEDURE — G8484 FLU IMMUNIZE NO ADMIN: HCPCS | Performed by: ORTHOPAEDIC SURGERY

## 2023-10-10 PROCEDURE — 99214 OFFICE O/P EST MOD 30 MIN: CPT | Performed by: ORTHOPAEDIC SURGERY

## 2023-10-10 NOTE — PROGRESS NOTES
taking: Reported on 2023), Disp: 5 Adjustable Dose Pre-filled Pen Syringe, Rfl: 0    blood glucose monitor kit and supplies, Dispense sufficient amount for indicated testing frequency plus additional to accommodate PRN testing needs. Dispense all needed supplies to include: monitor, strips, lancing device, lancets, control solutions, alcohol swabs., Disp: 1 kit, Rfl: 3    blood glucose monitor kit and supplies, Dispense sufficient amount for indicated testing frequency plus additional to accommodate PRN testing needs.  Dispense all needed supplies to include: monitor, strips, lancing device, lancets, control solutions, alcohol swabs., Disp: 1 kit, Rfl: 0    Insulin Pen Needle 32G X 4 MM MISC, 1 each by Does not apply route daily, Disp: 100 each, Rfl: 3  Allergies   Allergen Reactions    Nubain [Nalbuphine Hcl]     Hydroxyzine Other (See Comments), Nausea Only and Nausea And Vomiting    Vistaril [Hydroxyzine Hcl] Nausea And Vomiting     Social History     Socioeconomic History    Marital status:      Spouse name: Not on file    Number of children: Not on file    Years of education: Not on file    Highest education level: Not on file   Occupational History    Not on file   Tobacco Use    Smoking status: Former     Packs/day: 1.00     Years: 27.00     Additional pack years: 0.00     Total pack years: 27.00     Types: Cigarettes     Quit date: 10/15/2003     Years since quittin.0    Smokeless tobacco: Never   Substance and Sexual Activity    Alcohol use: No    Drug use: No    Sexual activity: Not on file   Other Topics Concern    Not on file   Social History Narrative    Not on file     Social Determinants of Health     Financial Resource Strain: Low Risk  (2023)    Overall Financial Resource Strain (CARDIA)     Difficulty of Paying Living Expenses: Not hard at all   Food Insecurity: No Food Insecurity (2023)    Hunger Vital Sign     Worried About Running Out of Food in the Last Year: Never

## 2023-10-16 ENCOUNTER — HOSPITAL ENCOUNTER (OUTPATIENT)
Facility: MEDICAL CENTER | Age: 83
End: 2023-10-16
Payer: MEDICARE

## 2023-10-19 ENCOUNTER — TELEPHONE (OUTPATIENT)
Dept: FAMILY MEDICINE CLINIC | Age: 83
End: 2023-10-19

## 2023-10-19 ENCOUNTER — HOSPITAL ENCOUNTER (OUTPATIENT)
Dept: INFUSION THERAPY | Facility: MEDICAL CENTER | Age: 83
Discharge: HOME | End: 2023-10-19
Payer: MEDICARE

## 2023-10-19 ENCOUNTER — HOSPITAL ENCOUNTER (OUTPATIENT)
Age: 83
Setting detail: SPECIMEN
Discharge: HOME | End: 2023-10-19
Payer: MEDICARE

## 2023-10-19 VITALS
RESPIRATION RATE: 16 BRPM | SYSTOLIC BLOOD PRESSURE: 121 MMHG | TEMPERATURE: 98.1 F | DIASTOLIC BLOOD PRESSURE: 74 MMHG | HEART RATE: 91 BPM

## 2023-10-19 DIAGNOSIS — M80.00XS AGE-RELATED OSTEOPOROSIS WITH CURRENT PATHOLOGICAL FRACTURE, SEQUELA: Primary | ICD-10-CM

## 2023-10-19 DIAGNOSIS — M80.00XD AGE-RELATED OSTEOPOROSIS WITH CURRENT PATHOLOGICAL FRACTURE WITH ROUTINE HEALING, SUBSEQUENT ENCOUNTER: ICD-10-CM

## 2023-10-19 LAB
ANION GAP SERPL CALCULATED.3IONS-SCNC: 10 MMOL/L (ref 9–17)
BUN SERPL-MCNC: 14 MG/DL (ref 8–23)
BUN/CREAT SERPL: 28 (ref 9–20)
CALCIUM SERPL-MCNC: 9.1 MG/DL (ref 8.6–10.4)
CHLORIDE SERPL-SCNC: 97 MMOL/L (ref 98–107)
CO2 SERPL-SCNC: 26 MMOL/L (ref 20–31)
CREAT SERPL-MCNC: 0.5 MG/DL (ref 0.5–0.9)
GFR SERPL CREATININE-BSD FRML MDRD: >60 ML/MIN/1.73M2
GLUCOSE SERPL-MCNC: 329 MG/DL (ref 70–99)
POTASSIUM SERPL-SCNC: 4.4 MMOL/L (ref 3.7–5.3)
SODIUM SERPL-SCNC: 133 MMOL/L (ref 135–144)

## 2023-10-19 PROCEDURE — 80048 BASIC METABOLIC PNL TOTAL CA: CPT

## 2023-10-19 PROCEDURE — 6360000002 HC RX W HCPCS

## 2023-10-19 PROCEDURE — 36415 COLL VENOUS BLD VENIPUNCTURE: CPT

## 2023-10-19 PROCEDURE — 96372 THER/PROPH/DIAG INJ SC/IM: CPT

## 2023-10-19 RX ADMIN — ROMOSOZUMAB-AQQG 105 MG: 105 INJECTION, SOLUTION SUBCUTANEOUS at 14:48

## 2023-10-19 RX ADMIN — ROMOSOZUMAB-AQQG 105 MG: 105 INJECTION, SOLUTION SUBCUTANEOUS at 14:49

## 2023-10-19 NOTE — PROGRESS NOTES
Patient arrive ambulatory for Evenity  injection. Patient denies complaint or concern;   Order and labs reviewed. Patient tolerate Evenity injection well; band-aid applied.   Patient discharged

## 2023-10-19 NOTE — PLAN OF CARE
Problem: Chronic Conditions and Co-morbidities  Goal: Patient's chronic conditions and co-morbidity symptoms are monitored and maintained or improved  Outcome: Completed     Problem: Safety - Adult  Goal: Free from fall injury  Reactivated

## 2023-10-19 NOTE — TELEPHONE ENCOUNTER
[facility] called and said evenity was only sent for one dose. Please send more so they can finish treatment.

## 2023-10-20 DIAGNOSIS — M80.00XD AGE-RELATED OSTEOPOROSIS WITH CURRENT PATHOLOGICAL FRACTURE WITH ROUTINE HEALING, SUBSEQUENT ENCOUNTER: ICD-10-CM

## 2023-10-20 RX ORDER — ROMOSOZUMAB-AQQG 105 MG/1.17ML
210 INJECTION, SOLUTION SUBCUTANEOUS ONCE
Qty: 2.24 ML | Refills: 11 | Status: SHIPPED | OUTPATIENT
Start: 2023-10-20 | End: 2023-10-20

## 2023-10-21 DIAGNOSIS — Z79.4 TYPE 2 DIABETES MELLITUS WITH DIABETIC POLYNEUROPATHY, WITH LONG-TERM CURRENT USE OF INSULIN (HCC): ICD-10-CM

## 2023-10-21 DIAGNOSIS — E11.42 TYPE 2 DIABETES MELLITUS WITH DIABETIC POLYNEUROPATHY, WITH LONG-TERM CURRENT USE OF INSULIN (HCC): ICD-10-CM

## 2023-10-21 RX ORDER — CALCIUM CITRATE/VITAMIN D3 200MG-6.25
TABLET ORAL
Qty: 300 STRIP | Refills: 10 | Status: SHIPPED | OUTPATIENT
Start: 2023-10-21

## 2023-10-21 NOTE — TELEPHONE ENCOUNTER
Ricky Manuel is calling to request a refill on the following medication(s):    Medication Request:  Requested Prescriptions     Pending Prescriptions Disp Refills    TRUE METRIX BLOOD GLUCOSE TEST strip [Pharmacy Med Name: TRUE METRIX SELF MONITORING BLOOD GLUCOSE STRIPS   Strip] 300 strip 10     Sig: TEST BLOOD SUGAR TWICE DAILY AND AS NEEDED FOR S/S HYPO / HYPERGLYCEMIA       Last Visit Date (If Applicable):  9/25/15    Next Visit Date:    10/23/23

## 2023-10-23 ENCOUNTER — OFFICE VISIT (OUTPATIENT)
Dept: FAMILY MEDICINE CLINIC | Age: 83
End: 2023-10-23
Payer: MEDICARE

## 2023-10-23 VITALS
DIASTOLIC BLOOD PRESSURE: 64 MMHG | OXYGEN SATURATION: 96 % | HEART RATE: 88 BPM | WEIGHT: 94 LBS | BODY MASS INDEX: 17.76 KG/M2 | SYSTOLIC BLOOD PRESSURE: 110 MMHG | TEMPERATURE: 97.7 F

## 2023-10-23 DIAGNOSIS — E11.42 TYPE 2 DIABETES MELLITUS WITH DIABETIC POLYNEUROPATHY, WITH LONG-TERM CURRENT USE OF INSULIN (HCC): ICD-10-CM

## 2023-10-23 DIAGNOSIS — E11.42 DIABETIC POLYNEUROPATHY ASSOCIATED WITH TYPE 2 DIABETES MELLITUS (HCC): ICD-10-CM

## 2023-10-23 DIAGNOSIS — Z23 NEED FOR INFLUENZA VACCINATION: ICD-10-CM

## 2023-10-23 DIAGNOSIS — I10 PRIMARY HYPERTENSION: Primary | ICD-10-CM

## 2023-10-23 DIAGNOSIS — F41.9 ANXIETY: ICD-10-CM

## 2023-10-23 DIAGNOSIS — Z79.4 TYPE 2 DIABETES MELLITUS WITH DIABETIC POLYNEUROPATHY, WITH LONG-TERM CURRENT USE OF INSULIN (HCC): ICD-10-CM

## 2023-10-23 LAB — HBA1C MFR BLD: 10.1 %

## 2023-10-23 PROCEDURE — 99214 OFFICE O/P EST MOD 30 MIN: CPT

## 2023-10-23 PROCEDURE — G0008 ADMIN INFLUENZA VIRUS VAC: HCPCS

## 2023-10-23 PROCEDURE — 1036F TOBACCO NON-USER: CPT

## 2023-10-23 PROCEDURE — G8427 DOCREV CUR MEDS BY ELIG CLIN: HCPCS

## 2023-10-23 PROCEDURE — 1090F PRES/ABSN URINE INCON ASSESS: CPT

## 2023-10-23 PROCEDURE — 83036 HEMOGLOBIN GLYCOSYLATED A1C: CPT

## 2023-10-23 PROCEDURE — G8419 CALC BMI OUT NRM PARAM NOF/U: HCPCS

## 2023-10-23 PROCEDURE — G8484 FLU IMMUNIZE NO ADMIN: HCPCS

## 2023-10-23 PROCEDURE — 1123F ACP DISCUSS/DSCN MKR DOCD: CPT

## 2023-10-23 PROCEDURE — 3046F HEMOGLOBIN A1C LEVEL >9.0%: CPT

## 2023-10-23 PROCEDURE — 90694 VACC AIIV4 NO PRSRV 0.5ML IM: CPT

## 2023-10-23 PROCEDURE — G8399 PT W/DXA RESULTS DOCUMENT: HCPCS

## 2023-10-23 PROCEDURE — 3078F DIAST BP <80 MM HG: CPT

## 2023-10-23 PROCEDURE — 3074F SYST BP LT 130 MM HG: CPT

## 2023-10-23 NOTE — PROGRESS NOTES
Dayna Arguello (:  1940) is a 81 y.o. female,Established patient, here for evaluation of the following chief complaint(s):  Diabetes          Subjective   SUBJECTIVE/OBJECTIVE:  Pt here today for DM f/u, accompanied by daughter  VSS    A1C worsened from previous improvement, she returned back to 10.1  Pt and daughter admit that she has been uncontrolled in her diet lately  Once she knew her DM had improved she began eating more sugary processed foods again  GLP1 was lowered at last visit because of continued weight loss    Anxiety improved on SSRI, doing well, denies panic attacks          Review of Systems   Constitutional:  Negative for activity change, appetite change, chills, fatigue, fever and unexpected weight change. HENT:  Negative for congestion, ear pain and sore throat. Eyes:  Negative for discharge and visual disturbance. Respiratory:  Negative for cough, chest tightness, shortness of breath and wheezing. Cardiovascular:  Negative for chest pain, palpitations and leg swelling. Gastrointestinal:  Negative for abdominal pain, constipation, diarrhea, nausea and vomiting. Genitourinary:  Negative for dysuria and pelvic pain. Musculoskeletal:  Negative for gait problem and neck pain. Skin:  Negative for color change, rash and wound. Neurological:  Negative for dizziness, seizures, syncope, weakness, light-headedness, numbness and headaches. Psychiatric/Behavioral:  Negative for behavioral problems, confusion and sleep disturbance. The patient is not nervous/anxious. Objective   Physical Exam  Vitals and nursing note reviewed. Constitutional:       General: She is not in acute distress. Appearance: She is well-developed. She is not ill-appearing, toxic-appearing or diaphoretic. Comments: cachectic   HENT:      Head: Normocephalic and atraumatic.       Right Ear: External ear normal.      Left Ear: External ear normal.      Nose: Nose normal.   Eyes:

## 2023-10-24 DIAGNOSIS — E11.42 TYPE 2 DIABETES MELLITUS WITH DIABETIC POLYNEUROPATHY, WITH LONG-TERM CURRENT USE OF INSULIN (HCC): ICD-10-CM

## 2023-10-24 DIAGNOSIS — Z79.4 TYPE 2 DIABETES MELLITUS WITH DIABETIC POLYNEUROPATHY, WITH LONG-TERM CURRENT USE OF INSULIN (HCC): ICD-10-CM

## 2023-10-24 PROBLEM — F41.9 ANXIETY: Status: ACTIVE | Noted: 2023-10-24

## 2023-10-24 RX ORDER — INSULIN ASPART 100 [IU]/ML
INJECTION, SOLUTION INTRAVENOUS; SUBCUTANEOUS
Qty: 5 ADJUSTABLE DOSE PRE-FILLED PEN SYRINGE | Refills: 0 | Status: SHIPPED | OUTPATIENT
Start: 2023-10-24

## 2023-10-24 ASSESSMENT — ENCOUNTER SYMPTOMS
DIARRHEA: 0
CHEST TIGHTNESS: 0
EYE DISCHARGE: 0
ABDOMINAL PAIN: 0
VOMITING: 0
WHEEZING: 0
COLOR CHANGE: 0
CONSTIPATION: 0
NAUSEA: 0
SORE THROAT: 0
COUGH: 0
SHORTNESS OF BREATH: 0

## 2023-10-24 NOTE — TELEPHONE ENCOUNTER
Amisha Del Angel is calling to request a refill on the following medication(s):    Medication Request:  Requested Prescriptions     Pending Prescriptions Disp Refills    insulin aspart (NOVOLOG FLEXPEN) 100 UNIT/ML injection pen 5 Adjustable Dose Pre-filled Pen Syringe 0     Sig: TIDbefore meals 141-180 6units,181-220 8units,221-260 10units,261-300 12units,301-350 14units,351-400 16 units,>400 18units [id number] daily       Last Visit Date (If Applicable):  41/44/6935    Next Visit Date:    1/23/2024

## 2023-11-01 ENCOUNTER — TELEPHONE (OUTPATIENT)
Dept: ONCOLOGY | Age: 83
End: 2023-11-01

## 2023-11-01 ENCOUNTER — TELEPHONE (OUTPATIENT)
Dept: INFUSION THERAPY | Facility: MEDICAL CENTER | Age: 83
End: 2023-11-01

## 2023-11-01 DIAGNOSIS — F41.9 ANXIETY: ICD-10-CM

## 2023-11-01 RX ORDER — SERTRALINE HYDROCHLORIDE 25 MG/1
25 TABLET, FILM COATED ORAL DAILY
Qty: 90 TABLET | Refills: 1 | Status: SHIPPED | OUTPATIENT
Start: 2023-11-01

## 2023-11-01 NOTE — TELEPHONE ENCOUNTER
Autumn Gamez is calling to request a refill on the following medication(s):    Medication Request:  Requested Prescriptions     Pending Prescriptions Disp Refills    sertraline (ZOLOFT) 25 MG tablet [Pharmacy Med Name: SERTRALINE HCL 25 MG Tablet] 90 tablet 1     Sig: TAKE 1 TABLET EVERY DAY       Last Visit Date (If Applicable):  01/19/6415    Next Visit Date:    1/23/2024

## 2023-11-01 NOTE — TELEPHONE ENCOUNTER
I TRIED TO CALL AISHWARYA TO SCHEDULE HER  EVENITY AND HAD TO LEAVE  A MESSAGE TO CALL THE OFFICE TO SCHEDULE.

## 2023-11-02 ENCOUNTER — TELEPHONE (OUTPATIENT)
Dept: FAMILY MEDICINE CLINIC | Age: 83
End: 2023-11-02

## 2023-11-02 DIAGNOSIS — E11.42 DIABETIC POLYNEUROPATHY ASSOCIATED WITH TYPE 2 DIABETES MELLITUS (HCC): ICD-10-CM

## 2023-11-02 DIAGNOSIS — M80.00XS AGE-RELATED OSTEOPOROSIS WITH CURRENT PATHOLOGICAL FRACTURE, SEQUELA: Primary | ICD-10-CM

## 2023-11-02 RX ORDER — GABAPENTIN 300 MG/1
CAPSULE ORAL
Qty: 270 CAPSULE | Refills: 0 | Status: SHIPPED | OUTPATIENT
Start: 2023-11-02 | End: 2023-12-02

## 2023-11-02 NOTE — TELEPHONE ENCOUNTER
Calcium Lab order needed put in patient's chart for 12 visits please before infusion can be scheduled.     Thank you,    Dallas Carey

## 2023-11-02 NOTE — TELEPHONE ENCOUNTER
Bobby Luna is calling to request a refill on the following medication(s):    Medication Request:  Requested Prescriptions     Pending Prescriptions Disp Refills    gabapentin (NEURONTIN) 300 MG capsule 90 capsule 0     Sig: TAKE 1 CAPSULE THREE TIMES DAILY       Last Visit Date (If Applicable):  28/03/3309    Next Visit Date:    1/23/2024

## 2023-11-06 ENCOUNTER — TELEPHONE (OUTPATIENT)
Dept: INFUSION THERAPY | Facility: MEDICAL CENTER | Age: 83
End: 2023-11-06

## 2023-11-06 NOTE — TELEPHONE ENCOUNTER
New order 10/20/23  Valere Livers APRN - CNP  Evenity 105 mg SQ Q 4 weeks  Labs : Calcium  Order noted and chart to front office for processing.

## 2023-11-10 ENCOUNTER — TELEPHONE (OUTPATIENT)
Dept: FAMILY MEDICINE CLINIC | Age: 83
End: 2023-11-10

## 2023-11-10 NOTE — TELEPHONE ENCOUNTER
Patient called wanting to know if she can have Jardiance samples too when daughter picks up her Ozempic today?

## 2023-11-20 ENCOUNTER — HOSPITAL ENCOUNTER (OUTPATIENT)
Dept: INFUSION THERAPY | Facility: MEDICAL CENTER | Age: 83
Discharge: HOME | End: 2023-11-20
Payer: MEDICARE

## 2023-11-20 ENCOUNTER — HOSPITAL ENCOUNTER (OUTPATIENT)
Facility: MEDICAL CENTER | Age: 83
Discharge: HOME | End: 2023-11-20
Payer: MEDICARE

## 2023-11-20 VITALS
DIASTOLIC BLOOD PRESSURE: 69 MMHG | RESPIRATION RATE: 16 BRPM | HEART RATE: 87 BPM | SYSTOLIC BLOOD PRESSURE: 126 MMHG | TEMPERATURE: 97.7 F

## 2023-11-20 DIAGNOSIS — M80.00XS AGE-RELATED OSTEOPOROSIS WITH CURRENT PATHOLOGICAL FRACTURE, SEQUELA: ICD-10-CM

## 2023-11-20 DIAGNOSIS — M80.00XS AGE-RELATED OSTEOPOROSIS WITH CURRENT PATHOLOGICAL FRACTURE, SEQUELA: Primary | ICD-10-CM

## 2023-11-20 LAB — CALCIUM SERPL-MCNC: 9 MG/DL (ref 8.6–10.4)

## 2023-11-20 PROCEDURE — 36415 COLL VENOUS BLD VENIPUNCTURE: CPT

## 2023-11-20 PROCEDURE — 82310 ASSAY OF CALCIUM: CPT

## 2023-11-20 PROCEDURE — 6360000002 HC RX W HCPCS

## 2023-11-20 PROCEDURE — 96372 THER/PROPH/DIAG INJ SC/IM: CPT

## 2023-11-20 RX ADMIN — ROMOSOZUMAB-AQQG 105 MG: 105 INJECTION, SOLUTION SUBCUTANEOUS at 14:55

## 2023-11-20 RX ADMIN — ROMOSOZUMAB-AQQG 105 MG: 105 INJECTION, SOLUTION SUBCUTANEOUS at 14:54

## 2023-11-20 NOTE — PROGRESS NOTES
Patient arrived ambulatory for Evenity injection. Patient denies complaint or concern; denies dental or jaw issues. Order and labs reviewed. Patient tolerated injection well; band-aids applied. Patient discharged, calendar in hand.

## 2023-12-15 ENCOUNTER — TELEPHONE (OUTPATIENT)
Dept: FAMILY MEDICINE CLINIC | Age: 83
End: 2023-12-15

## 2023-12-18 ENCOUNTER — HOSPITAL ENCOUNTER (OUTPATIENT)
Dept: INFUSION THERAPY | Facility: MEDICAL CENTER | Age: 83
Discharge: HOME | End: 2023-12-18
Payer: MEDICARE

## 2023-12-18 ENCOUNTER — HOSPITAL ENCOUNTER (OUTPATIENT)
Facility: MEDICAL CENTER | Age: 83
Discharge: HOME | End: 2023-12-18
Payer: MEDICARE

## 2023-12-18 VITALS
SYSTOLIC BLOOD PRESSURE: 115 MMHG | DIASTOLIC BLOOD PRESSURE: 69 MMHG | HEART RATE: 90 BPM | RESPIRATION RATE: 16 BRPM | TEMPERATURE: 98.3 F

## 2023-12-18 DIAGNOSIS — M80.00XS AGE-RELATED OSTEOPOROSIS WITH CURRENT PATHOLOGICAL FRACTURE, SEQUELA: Primary | ICD-10-CM

## 2023-12-18 DIAGNOSIS — M80.00XS AGE-RELATED OSTEOPOROSIS WITH CURRENT PATHOLOGICAL FRACTURE, SEQUELA: ICD-10-CM

## 2023-12-18 LAB — CALCIUM SERPL-MCNC: 8.9 MG/DL (ref 8.6–10.4)

## 2023-12-18 PROCEDURE — 96372 THER/PROPH/DIAG INJ SC/IM: CPT

## 2023-12-18 PROCEDURE — 82310 ASSAY OF CALCIUM: CPT

## 2023-12-18 PROCEDURE — 6360000002 HC RX W HCPCS

## 2023-12-18 PROCEDURE — 36415 COLL VENOUS BLD VENIPUNCTURE: CPT

## 2023-12-18 RX ADMIN — ROMOSOZUMAB-AQQG 105 MG: 105 INJECTION, SOLUTION SUBCUTANEOUS at 15:59

## 2023-12-18 RX ADMIN — ROMOSOZUMAB-AQQG 105 MG: 105 INJECTION, SOLUTION SUBCUTANEOUS at 15:58

## 2023-12-18 NOTE — PROGRESS NOTES
Patient arrived ambulatory with daughter for Evenity injection. Patient denies complaints or concerns. Labs reviewed. Evenity injections given with band aides applied to sites. Patient and daughter ambulated off unit at discharge. Printed calendar in hand.

## 2024-01-10 DIAGNOSIS — E11.42 DIABETIC POLYNEUROPATHY ASSOCIATED WITH TYPE 2 DIABETES MELLITUS (HCC): ICD-10-CM

## 2024-01-10 NOTE — TELEPHONE ENCOUNTER
Safia Sampson is calling to request a refill on the following medication(s):    Medication Request:  Requested Prescriptions     Pending Prescriptions Disp Refills    gabapentin (NEURONTIN) 300 MG capsule [Pharmacy Med Name: GABAPENTIN 300 MG Capsule] 270 capsule 3     Sig: TAKE 1 CAPSULE THREE TIMES DAILY       Last Visit Date (If Applicable):  10/23/2023    Next Visit Date:    1/23/2024

## 2024-01-11 RX ORDER — GABAPENTIN 300 MG/1
CAPSULE ORAL
Qty: 270 CAPSULE | Refills: 0 | Status: SHIPPED | OUTPATIENT
Start: 2024-01-11 | End: 2024-02-11

## 2024-01-15 ENCOUNTER — HOSPITAL ENCOUNTER (OUTPATIENT)
Facility: MEDICAL CENTER | Age: 84
End: 2024-01-15

## 2024-01-17 ENCOUNTER — HOSPITAL ENCOUNTER (OUTPATIENT)
Dept: INFUSION THERAPY | Facility: MEDICAL CENTER | Age: 84
End: 2024-01-17

## 2024-01-23 ENCOUNTER — OFFICE VISIT (OUTPATIENT)
Dept: FAMILY MEDICINE CLINIC | Age: 84
End: 2024-01-23
Payer: MEDICARE

## 2024-01-23 VITALS
SYSTOLIC BLOOD PRESSURE: 118 MMHG | DIASTOLIC BLOOD PRESSURE: 66 MMHG | BODY MASS INDEX: 17.5 KG/M2 | HEART RATE: 88 BPM | WEIGHT: 92.6 LBS | TEMPERATURE: 97.3 F | OXYGEN SATURATION: 97 %

## 2024-01-23 DIAGNOSIS — Z79.4 TYPE 2 DIABETES MELLITUS WITH DIABETIC POLYNEUROPATHY, WITH LONG-TERM CURRENT USE OF INSULIN (HCC): Primary | ICD-10-CM

## 2024-01-23 DIAGNOSIS — M80.00XD AGE-RELATED OSTEOPOROSIS WITH CURRENT PATHOLOGICAL FRACTURE WITH ROUTINE HEALING, SUBSEQUENT ENCOUNTER: ICD-10-CM

## 2024-01-23 DIAGNOSIS — E78.5 DYSLIPIDEMIA: ICD-10-CM

## 2024-01-23 DIAGNOSIS — R63.6 MILDLY UNDERWEIGHT ADULT: ICD-10-CM

## 2024-01-23 DIAGNOSIS — E11.42 TYPE 2 DIABETES MELLITUS WITH DIABETIC POLYNEUROPATHY, WITH LONG-TERM CURRENT USE OF INSULIN (HCC): Primary | ICD-10-CM

## 2024-01-23 DIAGNOSIS — J44.9 CHRONIC OBSTRUCTIVE PULMONARY DISEASE, UNSPECIFIED COPD TYPE (HCC): ICD-10-CM

## 2024-01-23 LAB — HBA1C MFR BLD: 10.1 %

## 2024-01-23 PROCEDURE — G8419 CALC BMI OUT NRM PARAM NOF/U: HCPCS

## 2024-01-23 PROCEDURE — 83036 HEMOGLOBIN GLYCOSYLATED A1C: CPT

## 2024-01-23 PROCEDURE — 3046F HEMOGLOBIN A1C LEVEL >9.0%: CPT

## 2024-01-23 PROCEDURE — 3023F SPIROM DOC REV: CPT

## 2024-01-23 PROCEDURE — 1123F ACP DISCUSS/DSCN MKR DOCD: CPT

## 2024-01-23 PROCEDURE — G8484 FLU IMMUNIZE NO ADMIN: HCPCS

## 2024-01-23 PROCEDURE — 3074F SYST BP LT 130 MM HG: CPT

## 2024-01-23 PROCEDURE — 1036F TOBACCO NON-USER: CPT

## 2024-01-23 PROCEDURE — 1090F PRES/ABSN URINE INCON ASSESS: CPT

## 2024-01-23 PROCEDURE — 3078F DIAST BP <80 MM HG: CPT

## 2024-01-23 PROCEDURE — G8399 PT W/DXA RESULTS DOCUMENT: HCPCS

## 2024-01-23 PROCEDURE — G8427 DOCREV CUR MEDS BY ELIG CLIN: HCPCS

## 2024-01-23 PROCEDURE — 99214 OFFICE O/P EST MOD 30 MIN: CPT

## 2024-01-23 RX ORDER — SEMAGLUTIDE 1.34 MG/ML
0.5 INJECTION, SOLUTION SUBCUTANEOUS
Qty: 1.5 ML | Refills: 2
Start: 2024-01-23

## 2024-01-23 SDOH — ECONOMIC STABILITY: FOOD INSECURITY: WITHIN THE PAST 12 MONTHS, THE FOOD YOU BOUGHT JUST DIDN'T LAST AND YOU DIDN'T HAVE MONEY TO GET MORE.: NEVER TRUE

## 2024-01-23 SDOH — ECONOMIC STABILITY: FOOD INSECURITY: WITHIN THE PAST 12 MONTHS, YOU WORRIED THAT YOUR FOOD WOULD RUN OUT BEFORE YOU GOT MONEY TO BUY MORE.: NEVER TRUE

## 2024-01-23 SDOH — ECONOMIC STABILITY: INCOME INSECURITY: HOW HARD IS IT FOR YOU TO PAY FOR THE VERY BASICS LIKE FOOD, HOUSING, MEDICAL CARE, AND HEATING?: NOT HARD AT ALL

## 2024-01-23 ASSESSMENT — PATIENT HEALTH QUESTIONNAIRE - PHQ9
1. LITTLE INTEREST OR PLEASURE IN DOING THINGS: 0
2. FEELING DOWN, DEPRESSED OR HOPELESS: 0
SUM OF ALL RESPONSES TO PHQ QUESTIONS 1-9: 0
SUM OF ALL RESPONSES TO PHQ9 QUESTIONS 1 & 2: 0

## 2024-01-23 NOTE — PROGRESS NOTES
Safia Sampson (:  1940) is a 83 y.o. female,Established patient, here for evaluation of the following chief complaint(s):  Hypertension and Diabetes          Subjective   SUBJECTIVE/OBJECTIVE:  Pt here today for DM/HTN f/u, accompanied by   VSS    A1C unchanged, remains 10.1  Pt's daughter admits that pt did not make discussed dietary changes from previous visit    My main concern along w/ DM control is her continued weight loss  W/ her age, low BMI and hx of osteoporosis w/ fracture she is a high fall risk w/ high likelihood for injury  Pt agreeable to changes to med regimen    Has upcoming injection for Evenity, pt received approval for full year's worth of therapy        Review of Systems       Objective   Physical Exam  Vitals and nursing note reviewed.   Constitutional:       General: She is not in acute distress.     Appearance: She is not ill-appearing, toxic-appearing or diaphoretic.   Neck:      Vascular: No carotid bruit.   Cardiovascular:      Rate and Rhythm: Normal rate and regular rhythm.      Pulses: Normal pulses.      Heart sounds: Normal heart sounds.   Pulmonary:      Effort: Pulmonary effort is normal.      Breath sounds: Normal breath sounds.   Musculoskeletal:      Right lower leg: No edema.      Left lower leg: No edema.   Neurological:      Mental Status: She is alert.                 ASSESSMENT/PLAN:  1. Type 2 diabetes mellitus with diabetic polyneuropathy, with long-term current use of insulin (HCC)  -uncontrolled  -plan to increase Jardiance to 25 mg, pt is sample dependent on this at this moment  -reduce Ozempic to 0.5 mg weekly, pt's daughter administers this and is comfortable counting clicks in order to use pt assistance stock they have  -increase Tresiba to 16 units daily  -strongly advised pt to adhere to diet changes we discussed    -     POCT glycosylated hemoglobin (Hb A1C)  -     empagliflozin (JARDIANCE) 25 MG tablet; Take 1 tablet by mouth daily, Disp-30

## 2024-01-24 ENCOUNTER — HOSPITAL ENCOUNTER (OUTPATIENT)
Dept: INFUSION THERAPY | Facility: MEDICAL CENTER | Age: 84
Discharge: HOME | End: 2024-01-24
Payer: MEDICARE

## 2024-01-24 ENCOUNTER — HOSPITAL ENCOUNTER (OUTPATIENT)
Facility: MEDICAL CENTER | Age: 84
Discharge: HOME | End: 2024-01-24
Payer: MEDICARE

## 2024-01-24 DIAGNOSIS — M80.00XS AGE-RELATED OSTEOPOROSIS WITH CURRENT PATHOLOGICAL FRACTURE, SEQUELA: Primary | ICD-10-CM

## 2024-01-24 LAB — CALCIUM SERPL-MCNC: 8.9 MG/DL (ref 8.6–10.4)

## 2024-01-24 PROCEDURE — 82310 ASSAY OF CALCIUM: CPT

## 2024-01-24 PROCEDURE — 36415 COLL VENOUS BLD VENIPUNCTURE: CPT

## 2024-01-24 PROCEDURE — 96372 THER/PROPH/DIAG INJ SC/IM: CPT

## 2024-01-24 PROCEDURE — 6360000002 HC RX W HCPCS

## 2024-01-24 RX ADMIN — ROMOSOZUMAB-AQQG 105 MG: 105 INJECTION, SOLUTION SUBCUTANEOUS at 12:58

## 2024-01-24 RX ADMIN — ROMOSOZUMAB-AQQG 105 MG: 105 INJECTION, SOLUTION SUBCUTANEOUS at 12:57

## 2024-01-30 ENCOUNTER — TELEPHONE (OUTPATIENT)
Dept: FAMILY MEDICINE CLINIC | Age: 84
End: 2024-01-30

## 2024-01-30 DIAGNOSIS — Z79.4 TYPE 2 DIABETES MELLITUS WITH DIABETIC POLYNEUROPATHY, WITH LONG-TERM CURRENT USE OF INSULIN (HCC): Primary | ICD-10-CM

## 2024-01-30 DIAGNOSIS — E11.42 TYPE 2 DIABETES MELLITUS WITH DIABETIC POLYNEUROPATHY, WITH LONG-TERM CURRENT USE OF INSULIN (HCC): Primary | ICD-10-CM

## 2024-01-31 DIAGNOSIS — E11.42 TYPE 2 DIABETES MELLITUS WITH DIABETIC POLYNEUROPATHY, WITH LONG-TERM CURRENT USE OF INSULIN (HCC): Primary | ICD-10-CM

## 2024-01-31 DIAGNOSIS — Z79.4 TYPE 2 DIABETES MELLITUS WITH DIABETIC POLYNEUROPATHY, WITH LONG-TERM CURRENT USE OF INSULIN (HCC): Primary | ICD-10-CM

## 2024-02-12 ENCOUNTER — TELEPHONE (OUTPATIENT)
Dept: FAMILY MEDICINE CLINIC | Age: 84
End: 2024-02-12

## 2024-02-12 DIAGNOSIS — Z79.4 TYPE 2 DIABETES MELLITUS WITH DIABETIC POLYNEUROPATHY, WITH LONG-TERM CURRENT USE OF INSULIN (HCC): Primary | ICD-10-CM

## 2024-02-12 DIAGNOSIS — E11.42 TYPE 2 DIABETES MELLITUS WITH DIABETIC POLYNEUROPATHY, WITH LONG-TERM CURRENT USE OF INSULIN (HCC): Primary | ICD-10-CM

## 2024-02-21 ENCOUNTER — HOSPITAL ENCOUNTER (OUTPATIENT)
Facility: MEDICAL CENTER | Age: 84
Discharge: HOME | End: 2024-02-21
Payer: MEDICARE

## 2024-02-21 ENCOUNTER — HOSPITAL ENCOUNTER (OUTPATIENT)
Dept: INFUSION THERAPY | Facility: MEDICAL CENTER | Age: 84
Discharge: HOME | End: 2024-02-21
Payer: MEDICARE

## 2024-02-21 VITALS
TEMPERATURE: 98.1 F | DIASTOLIC BLOOD PRESSURE: 70 MMHG | RESPIRATION RATE: 16 BRPM | HEART RATE: 103 BPM | SYSTOLIC BLOOD PRESSURE: 130 MMHG

## 2024-02-21 DIAGNOSIS — M80.00XS AGE-RELATED OSTEOPOROSIS WITH CURRENT PATHOLOGICAL FRACTURE, SEQUELA: Primary | ICD-10-CM

## 2024-02-21 LAB
ANION GAP SERPL CALCULATED.3IONS-SCNC: 11 MMOL/L (ref 9–17)
BUN SERPL-MCNC: 11 MG/DL (ref 8–23)
BUN/CREAT SERPL: 28 (ref 9–20)
CALCIUM SERPL-MCNC: 9.1 MG/DL (ref 8.6–10.4)
CHLORIDE SERPL-SCNC: 96 MMOL/L (ref 98–107)
CO2 SERPL-SCNC: 29 MMOL/L (ref 20–31)
CREAT SERPL-MCNC: 0.4 MG/DL (ref 0.5–0.9)
GFR SERPL CREATININE-BSD FRML MDRD: >60 ML/MIN/1.73M2
GLUCOSE SERPL-MCNC: 440 MG/DL (ref 70–99)
POTASSIUM SERPL-SCNC: 4.2 MMOL/L (ref 3.7–5.3)
SODIUM SERPL-SCNC: 136 MMOL/L (ref 135–144)

## 2024-02-21 PROCEDURE — 96372 THER/PROPH/DIAG INJ SC/IM: CPT

## 2024-02-21 PROCEDURE — 80048 BASIC METABOLIC PNL TOTAL CA: CPT

## 2024-02-21 PROCEDURE — 6360000002 HC RX W HCPCS

## 2024-02-21 PROCEDURE — 36415 COLL VENOUS BLD VENIPUNCTURE: CPT

## 2024-02-21 RX ADMIN — ROMOSOZUMAB-AQQG 105 MG: 105 INJECTION, SOLUTION SUBCUTANEOUS at 13:30

## 2024-02-21 RX ADMIN — ROMOSOZUMAB-AQQG 105 MG: 105 INJECTION, SOLUTION SUBCUTANEOUS at 13:31

## 2024-02-21 NOTE — PROGRESS NOTES
Patient arrive ambulatory for Evenity  injection.  Patient denies complaint or concern   Order and labs reviewed.  Informed  April Morales ,CNP office  ([facility]) glucose 440, orders receive to have pt use sliding scale at home .   Writer informed pt and pt's daughter of blood sugar and CNP wishes to use sliding scale at home .   Pt and daughter states understanding .   Patient tolerate Evenity injection well; band-aid applied.  Patient discharged

## 2024-02-25 DIAGNOSIS — E78.00 HYPERCHOLESTEROLEMIA: ICD-10-CM

## 2024-02-26 RX ORDER — ATORVASTATIN CALCIUM 10 MG/1
10 TABLET, FILM COATED ORAL DAILY
Qty: 90 TABLET | Refills: 3 | Status: SHIPPED | OUTPATIENT
Start: 2024-02-26

## 2024-03-07 DIAGNOSIS — Z79.4 TYPE 2 DIABETES MELLITUS WITH DIABETIC POLYNEUROPATHY, WITH LONG-TERM CURRENT USE OF INSULIN (HCC): ICD-10-CM

## 2024-03-07 DIAGNOSIS — E11.42 TYPE 2 DIABETES MELLITUS WITH DIABETIC POLYNEUROPATHY, WITH LONG-TERM CURRENT USE OF INSULIN (HCC): ICD-10-CM

## 2024-03-07 NOTE — TELEPHONE ENCOUNTER
Safia Sampson is calling to request a refill on the following medication(s):    Medication Request:  Requested Prescriptions     Pending Prescriptions Disp Refills    TRUEplus Lancets 33G MISC [Pharmacy Med Name: TRUEPLUS LANCETS 33G] 100 each 11     Sig: TEST BLOOD SUGAR AS NEEDED AS DIRECTED    Alcohol Swabs (DROPSAFE ALCOHOL PREP) 70 % PADS [Pharmacy Med Name: DROPSAFE ALCOHOL PREP PADS 70 % Pad]  11     Sig: USE AS DIRECTED AS NEEDED       Last Visit Date (If Applicable):  1/23/2024    Next Visit Date:    4/23/2024

## 2024-03-08 RX ORDER — ISOPROPYL ALCOHOL 70 ML/100ML
SWAB TOPICAL
Qty: 100 EACH | Refills: 11 | Status: SHIPPED | OUTPATIENT
Start: 2024-03-08

## 2024-03-08 RX ORDER — GLUCOSAM/CHON-MSM1/C/MANG/BOSW 500-416.6
TABLET ORAL
Qty: 100 EACH | Refills: 11 | Status: SHIPPED | OUTPATIENT
Start: 2024-03-08

## 2024-03-13 DIAGNOSIS — Z79.4 TYPE 2 DIABETES MELLITUS WITH DIABETIC POLYNEUROPATHY, WITH LONG-TERM CURRENT USE OF INSULIN (HCC): ICD-10-CM

## 2024-03-13 DIAGNOSIS — E11.42 TYPE 2 DIABETES MELLITUS WITH DIABETIC POLYNEUROPATHY, WITH LONG-TERM CURRENT USE OF INSULIN (HCC): ICD-10-CM

## 2024-03-13 RX ORDER — CALCIUM CITRATE/VITAMIN D3 200MG-6.25
TABLET ORAL
Qty: 250 STRIP | Refills: 3 | Status: SHIPPED | OUTPATIENT
Start: 2024-03-13

## 2024-03-13 NOTE — TELEPHONE ENCOUNTER
Safia Sampson is calling to request a refill on the following medication(s):    Last Visit Date (If Applicable):  1/23/2024    Next Visit Date:    4/23/2024    Medication Request:  Requested Prescriptions     Pending Prescriptions Disp Refills    TRUE METRIX BLOOD GLUCOSE TEST strip [Pharmacy Med Name: TRUE METRIX SELF MONITORING BLOOD GLUCOSE STRIPS   Strip] 250 strip 3     Sig: CHECK BLOOD SUGARS TWICE DAILY AND AS NEEDED FOR SIGNS AND SYMPTOMS OF HYPO / HYPERGLYCEMIA

## 2024-03-19 ENCOUNTER — TELEPHONE (OUTPATIENT)
Dept: FAMILY MEDICINE CLINIC | Age: 84
End: 2024-03-19

## 2024-03-19 DIAGNOSIS — Z79.4 TYPE 2 DIABETES MELLITUS WITH DIABETIC POLYNEUROPATHY, WITH LONG-TERM CURRENT USE OF INSULIN (HCC): ICD-10-CM

## 2024-03-19 DIAGNOSIS — E11.42 TYPE 2 DIABETES MELLITUS WITH DIABETIC POLYNEUROPATHY, WITH LONG-TERM CURRENT USE OF INSULIN (HCC): ICD-10-CM

## 2024-03-19 NOTE — TELEPHONE ENCOUNTER
Patient advised. She said her readings have been good and she will bring the paper with hr to her next appt.

## 2024-03-24 DIAGNOSIS — F41.9 ANXIETY: ICD-10-CM

## 2024-03-25 RX ORDER — SERTRALINE HYDROCHLORIDE 25 MG/1
25 TABLET, FILM COATED ORAL DAILY
Qty: 90 TABLET | Refills: 3 | Status: SHIPPED | OUTPATIENT
Start: 2024-03-25

## 2024-03-25 NOTE — TELEPHONE ENCOUNTER
Safia Sampson is calling to request a refill on the following medication(s):    Medication Request:  Requested Prescriptions     Pending Prescriptions Disp Refills    sertraline (ZOLOFT) 25 MG tablet [Pharmacy Med Name: SERTRALINE HYDROCHLORIDE 25 MG Tablet] 90 tablet 3     Sig: TAKE 1 TABLET EVERY DAY       Last Visit Date (If Applicable):  1/23/2024    Next Visit Date:    4/23/2024                 Statement Selected

## 2024-03-28 ENCOUNTER — CLINICAL DOCUMENTATION (OUTPATIENT)
Facility: HOSPITAL | Age: 84
End: 2024-03-28

## 2024-03-28 ENCOUNTER — HOSPITAL ENCOUNTER (OUTPATIENT)
Age: 84
Setting detail: SPECIMEN
Discharge: HOME | End: 2024-03-28
Payer: MEDICARE

## 2024-03-28 ENCOUNTER — HOSPITAL ENCOUNTER (OUTPATIENT)
Dept: INFUSION THERAPY | Facility: MEDICAL CENTER | Age: 84
Discharge: HOME | End: 2024-03-28
Payer: MEDICARE

## 2024-03-28 VITALS
TEMPERATURE: 98.6 F | HEART RATE: 85 BPM | RESPIRATION RATE: 16 BRPM | SYSTOLIC BLOOD PRESSURE: 114 MMHG | DIASTOLIC BLOOD PRESSURE: 60 MMHG

## 2024-03-28 DIAGNOSIS — M80.00XS AGE-RELATED OSTEOPOROSIS WITH CURRENT PATHOLOGICAL FRACTURE, SEQUELA: Primary | ICD-10-CM

## 2024-03-28 DIAGNOSIS — M80.00XS AGE-RELATED OSTEOPOROSIS WITH CURRENT PATHOLOGICAL FRACTURE, SEQUELA: ICD-10-CM

## 2024-03-28 LAB — CALCIUM SERPL-MCNC: 8.9 MG/DL (ref 8.6–10.4)

## 2024-03-28 PROCEDURE — 96372 THER/PROPH/DIAG INJ SC/IM: CPT

## 2024-03-28 PROCEDURE — 6360000002 HC RX W HCPCS

## 2024-03-28 PROCEDURE — 36415 COLL VENOUS BLD VENIPUNCTURE: CPT

## 2024-03-28 PROCEDURE — 82310 ASSAY OF CALCIUM: CPT

## 2024-03-28 RX ADMIN — ROMOSOZUMAB-AQQG 105 MG: 105 INJECTION, SOLUTION SUBCUTANEOUS at 13:47

## 2024-03-28 NOTE — PROGRESS NOTES
Patient Assistance               Additional notes: Evenity reviewed; no assistance available.

## 2024-03-28 NOTE — PROGRESS NOTES
Patient arrived ambulatory for Evenity injection.  Patient denies complaint or concern; denies dental or jaw issues.   Order and labs reviewed.  Patient tolerated injection well; band-aids applied.  Patient discharged with calendar in hand.

## 2024-04-02 DIAGNOSIS — E11.42 DIABETIC POLYNEUROPATHY ASSOCIATED WITH TYPE 2 DIABETES MELLITUS (HCC): ICD-10-CM

## 2024-04-02 RX ORDER — GABAPENTIN 300 MG/1
CAPSULE ORAL
Qty: 270 CAPSULE | Refills: 0 | Status: SHIPPED | OUTPATIENT
Start: 2024-04-02 | End: 2024-07-02

## 2024-04-02 NOTE — TELEPHONE ENCOUNTER
Safia Sampson is calling to request a refill on the following medication(s):    Medication Request:  Requested Prescriptions     Pending Prescriptions Disp Refills    gabapentin (NEURONTIN) 300 MG capsule [Pharmacy Med Name: GABAPENTIN 300 MG Capsule] 270 capsule 3     Sig: TAKE 1 CAPSULE THREE TIMES DAILY       Last Visit Date (If Applicable):  1/23/2024    Next Visit Date:    4/23/2024

## 2024-04-15 ENCOUNTER — TELEPHONE (OUTPATIENT)
Dept: FAMILY MEDICINE CLINIC | Age: 84
End: 2024-04-15

## 2024-04-15 NOTE — TELEPHONE ENCOUNTER
SUBJECT: Sample    PATIENT/CALLER: Patient    CURRENT SYMPTOMS: Patient calling for samples of jardiance

## 2024-04-17 LAB
ABSOLUTE BASO #: 0.06 K/UL (ref 0–0.2)
ABSOLUTE EOS #: 0.05 K/UL (ref 0–0.5)
ABSOLUTE LYMPH #: 1.76 K/UL (ref 1–4)
ABSOLUTE MONO #: 0.57 K/UL (ref 0.2–1)
ABSOLUTE NEUT #: 3.48 K/UL (ref 1.5–7.5)
ALBUMIN: 4.5 G/DL (ref 3.5–5.2)
ALK PHOSPHATASE: 123 U/L (ref 40–142)
ALT SERPL-CCNC: 41 U/L (ref 5–40)
ANION GAP SERPL CALCULATED.3IONS-SCNC: 8 MEQ/L (ref 7–16)
AST SERPL-CCNC: 40 U/L (ref 9–40)
BASOPHILS RELATIVE PERCENT: 1 %
BILIRUB SERPL-MCNC: 0.5 MG/DL
BUN BLDV-MCNC: 15 MG/DL (ref 8–23)
CALCIUM SERPL-MCNC: 9.3 MG/DL (ref 8.5–10.5)
CHLORIDE BLD-SCNC: 101 MEQ/L (ref 95–107)
CHOLESTEROL/HDL RATIO: 1.5 RATIO
CHOLESTEROL: 166 MG/DL
CO2: 32 MEQ/L (ref 19–31)
CREAT SERPL-MCNC: 0.5 MG/DL (ref 0.6–1.3)
EGFR IF NONAFRICAN AMERICAN: 94 ML/MIN/1.73
EOSINOPHILS RELATIVE PERCENT: 0.8 %
ESTIMATED AVERAGE GLUCOSE: 194 MG/DL
GLUCOSE: 117 MG/DL (ref 70–99)
HBA1C MFR BLD: 8.4 % (ref 4.2–5.6)
HCT VFR BLD CALC: 42.4 % (ref 34–45)
HDLC SERPL-MCNC: 109 MG/DL
HEMOGLOBIN: 14.2 G/DL (ref 11.5–15.5)
IMMATURE GRANULOCYTES %: 0.3 %
LDL CHOLESTEROL CALCULATED: 43 MG/DL
LDL/HDL RATIO: 0.4 RATIO
LYMPHOCYTE %: 29.6 %
MCH RBC QN AUTO: 30.9 PG (ref 25–33)
MCHC RBC AUTO-ENTMCNC: 33.5 G/DL (ref 31–36)
MCV RBC AUTO: 92.4 FL (ref 80–99)
MICROALBUMIN/CREAT 24H UR: <1.2 MG/DL
MONOCYTES # BLD: 9.6 %
NEUTROPHILS RELATIVE PERCENT: 58.7 %
PDW BLD-RTO: 13.2 % (ref 11.5–15)
PLATELETS: 251 K/UL (ref 130–400)
PMV BLD AUTO: 10.5 FL (ref 9.3–13)
POTASSIUM SERPL-SCNC: 5 MEQ/L (ref 3.5–5.4)
RBC: 4.59 M/UL (ref 3.8–5.4)
SODIUM BLD-SCNC: 141 MEQ/L (ref 133–146)
TOTAL PROTEIN: 7 G/DL (ref 6.1–8.3)
TRIGL SERPL-MCNC: 71 MG/DL
TSH SERPL DL<=0.05 MIU/L-ACNC: 2.04 UIU/ML (ref 0.4–4.1)
VLDLC SERPL CALC-MCNC: 14 MG/DL
WBC: 5.9 K/UL (ref 3.5–11)

## 2024-04-23 ENCOUNTER — OFFICE VISIT (OUTPATIENT)
Dept: FAMILY MEDICINE CLINIC | Age: 84
End: 2024-04-23
Payer: MEDICARE

## 2024-04-23 VITALS
DIASTOLIC BLOOD PRESSURE: 74 MMHG | WEIGHT: 97 LBS | OXYGEN SATURATION: 97 % | SYSTOLIC BLOOD PRESSURE: 124 MMHG | BODY MASS INDEX: 18.31 KG/M2 | HEIGHT: 61 IN | TEMPERATURE: 97.8 F | HEART RATE: 84 BPM

## 2024-04-23 DIAGNOSIS — E11.42 TYPE 2 DIABETES MELLITUS WITH DIABETIC POLYNEUROPATHY, WITH LONG-TERM CURRENT USE OF INSULIN (HCC): ICD-10-CM

## 2024-04-23 DIAGNOSIS — I10 PRIMARY HYPERTENSION: ICD-10-CM

## 2024-04-23 DIAGNOSIS — Z00.00 MEDICARE ANNUAL WELLNESS VISIT, SUBSEQUENT: Primary | ICD-10-CM

## 2024-04-23 DIAGNOSIS — Z79.4 TYPE 2 DIABETES MELLITUS WITH DIABETIC POLYNEUROPATHY, WITH LONG-TERM CURRENT USE OF INSULIN (HCC): ICD-10-CM

## 2024-04-23 DIAGNOSIS — Z71.89 ACP (ADVANCE CARE PLANNING): ICD-10-CM

## 2024-04-23 DIAGNOSIS — J44.9 CHRONIC OBSTRUCTIVE PULMONARY DISEASE, UNSPECIFIED COPD TYPE (HCC): ICD-10-CM

## 2024-04-23 PROCEDURE — 1036F TOBACCO NON-USER: CPT

## 2024-04-23 PROCEDURE — 3074F SYST BP LT 130 MM HG: CPT

## 2024-04-23 PROCEDURE — G8399 PT W/DXA RESULTS DOCUMENT: HCPCS

## 2024-04-23 PROCEDURE — G0439 PPPS, SUBSEQ VISIT: HCPCS

## 2024-04-23 PROCEDURE — 1090F PRES/ABSN URINE INCON ASSESS: CPT

## 2024-04-23 PROCEDURE — 99214 OFFICE O/P EST MOD 30 MIN: CPT

## 2024-04-23 PROCEDURE — G8427 DOCREV CUR MEDS BY ELIG CLIN: HCPCS

## 2024-04-23 PROCEDURE — 1123F ACP DISCUSS/DSCN MKR DOCD: CPT

## 2024-04-23 PROCEDURE — G8419 CALC BMI OUT NRM PARAM NOF/U: HCPCS

## 2024-04-23 PROCEDURE — 3078F DIAST BP <80 MM HG: CPT

## 2024-04-23 PROCEDURE — 3052F HG A1C>EQUAL 8.0%<EQUAL 9.0%: CPT

## 2024-04-23 PROCEDURE — 3023F SPIROM DOC REV: CPT

## 2024-04-23 RX ORDER — PREDNISOLONE ACETATE 10 MG/ML
SUSPENSION/ DROPS OPHTHALMIC
COMMUNITY
Start: 2024-03-27

## 2024-04-23 SDOH — ECONOMIC STABILITY: INCOME INSECURITY: HOW HARD IS IT FOR YOU TO PAY FOR THE VERY BASICS LIKE FOOD, HOUSING, MEDICAL CARE, AND HEATING?: NOT HARD AT ALL

## 2024-04-23 SDOH — ECONOMIC STABILITY: FOOD INSECURITY: WITHIN THE PAST 12 MONTHS, YOU WORRIED THAT YOUR FOOD WOULD RUN OUT BEFORE YOU GOT MONEY TO BUY MORE.: NEVER TRUE

## 2024-04-23 SDOH — ECONOMIC STABILITY: FOOD INSECURITY: WITHIN THE PAST 12 MONTHS, THE FOOD YOU BOUGHT JUST DIDN'T LAST AND YOU DIDN'T HAVE MONEY TO GET MORE.: NEVER TRUE

## 2024-04-23 ASSESSMENT — PATIENT HEALTH QUESTIONNAIRE - PHQ9
1. LITTLE INTEREST OR PLEASURE IN DOING THINGS: NOT AT ALL
SUM OF ALL RESPONSES TO PHQ QUESTIONS 1-9: 0
SUM OF ALL RESPONSES TO PHQ QUESTIONS 1-9: 0
2. FEELING DOWN, DEPRESSED OR HOPELESS: NOT AT ALL
SUM OF ALL RESPONSES TO PHQ QUESTIONS 1-9: 0
SUM OF ALL RESPONSES TO PHQ9 QUESTIONS 1 & 2: 0
SUM OF ALL RESPONSES TO PHQ QUESTIONS 1-9: 0

## 2024-04-23 ASSESSMENT — LIFESTYLE VARIABLES
HOW MANY STANDARD DRINKS CONTAINING ALCOHOL DO YOU HAVE ON A TYPICAL DAY: 1 OR 2
HOW OFTEN DO YOU HAVE A DRINK CONTAINING ALCOHOL: 2-3 TIMES A WEEK

## 2024-04-23 NOTE — PATIENT INSTRUCTIONS
Summary for your review.    Other Preventive Recommendations:    A preventive eye exam performed by an eye specialist is recommended every 1-2 years to screen for glaucoma; cataracts, macular degeneration, and other eye disorders.  A preventive dental visit is recommended every 6 months.  Try to get at least 150 minutes of exercise per week or 10,000 steps per day on a pedometer .  Order or download the FREE \"Exercise & Physical Activity: Your Everyday Guide\" from [employer]. Call 1-117.959.6676 or search [employer] online.  You need 3097-6970 mg of calcium and 5268-9404 IU of vitamin D per day. It is possible to meet your calcium requirement with diet alone, but a vitamin D supplement is usually necessary to meet this goal.  When exposed to the sun, use a sunscreen that protects against both UVA and UVB radiation with an SPF of 30 or greater. Reapply every 2 to 3 hours or after sweating, drying off with a towel, or swimming.  Always wear a seat belt when traveling in a car. Always wear a helmet when riding a bicycle or motorcycle.

## 2024-04-23 NOTE — PROGRESS NOTES
Medicare Annual Wellness Visit    Safia Sampson is here for Medicare AWV, Diabetes, and Hypertension    Assessment & Plan   Medicare annual wellness visit, subsequent    Primary hypertension  -stable    Chronic obstructive pulmonary disease, unspecified COPD type (HCC)  -stable    Type 2 diabetes mellitus with diabetic polyneuropathy, with long-term current use of insulin (HCC)  -improving, congratulated on hard work on her diet changes!  -intermittent AM lows noted, pull back on Tresiba from 16 units q day to 12  -will get CGM  -if next A1C is same or better will work on d/c'ing Ozempic     -     Insulin Degludec 200 UNIT/ML SOPN; Inject 12 Units into the skin daily, Disp-9 mL, R-2Adjust Sig    ACP (advance care planning)  -     [facility] Referral to ACP Clinical Specialist    Recommendations for Preventive Services Due: see orders and patient instructions/AVS.  Recommended screening schedule for the next 5-10 years is provided to the patient in written form: see Patient Instructions/AVS.     Return in about 3 months (around 7/23/2024), or if symptoms worsen or fail to improve, for DM.     Subjective   The following acute and/or chronic problems were also addressed today:    See above    Patient's complete Health Risk Assessment and screening values have been reviewed and are found in Flowsheets. The following problems were reviewed today and where indicated follow up appointments were made and/or referrals ordered.    Positive Risk Factor Screenings with Interventions:    Fall Risk:  Do you feel unsteady or are you worried about falling? : (!) yes  2 or more falls in past year?: no  Fall with injury in past year?: (!) yes     Interventions:    Reviewed medications, home hazards, visual acuity, and co-morbidities that can increase risk for falls             Activity, Diet, and Weight:  On average, how many days per week do you engage in moderate to strenuous exercise (like a brisk walk)?: 1 day  On average, how many

## 2024-04-24 ENCOUNTER — CLINICAL DOCUMENTATION (OUTPATIENT)
Dept: SPIRITUAL SERVICES | Age: 84
End: 2024-04-24

## 2024-04-24 NOTE — ACP (ADVANCE CARE PLANNING)
Advance Care Planning   Ambulatory ACP Specialist Patient Outreach    Date:  4/24/2024    ACP Specialist:  Joellen Winston    Outreach call to patient in follow-up to ACP Specialist referral from: April Morales APRN - CNP    [x] PCP  [] Provider   [] Ambulatory Care Management [] Other     For:                  [x] Advance Directive Assistance              [] Complete Portable DNR order              [] Complete POST/POLST/MOST              [] Code Status Discussion             [] Discuss Goals of Care             [x] Early ACP Decision-Making              [] Other (Specify)    Date Referral Received: 4/23/2024    Next Step:   [x] ACP scheduled conversation  [] Outreach again in one week               [] Email / Mail ACP Info Sheets  [] Email / Mail Advance Directive   [] Closing referral.  Routing closure to referring provider/staff and to ACP Specialist .    [] Closure letter mailed to patient with invitation to contact ACP Specialist if / when ready.   [] Other (Specify here):         [x] At this time, Healthcare Decision Maker Is:    Advance Care Planning   Healthcare Decision Maker:    Primary Decision Maker: Odilon Sampson - Child - [phone]    Secondary Decision Maker: Casi Sampson - Child - [phone]      [] Primary agent named in scanned advance directive.    [x] Legal Next of Kin.     [] Unable to determine legal decision maker at this time.       Outreaches:       [x] 1st -  Date:  4/24/2024               Intervention:  [x] Spoke with Patient   [] Left Voice mail [] Email / Mail    [] to-BBBhart  [] Other (Specify) :     Outcomes:  Writer attempted ACP outreach to the one number listed for both - patient's home and mobile ([phone]) - spoke to patient.  Patient is agreeable to a conversation with ACP Specialist Opal Blankenship on Thursday, 5/2/2024 at 1:00 PM.  Patient reports that a copy of ACP documents provided by PCP office.      Thank you for this referral.

## 2024-04-25 ENCOUNTER — HOSPITAL ENCOUNTER (OUTPATIENT)
Dept: INFUSION THERAPY | Facility: MEDICAL CENTER | Age: 84
Discharge: HOME | End: 2024-04-25
Payer: MEDICARE

## 2024-04-25 ENCOUNTER — TELEPHONE (OUTPATIENT)
Dept: FAMILY MEDICINE CLINIC | Age: 84
End: 2024-04-25

## 2024-04-25 ENCOUNTER — HOSPITAL ENCOUNTER (OUTPATIENT)
Age: 84
Setting detail: SPECIMEN
Discharge: HOME | End: 2024-04-25
Payer: MEDICARE

## 2024-04-25 VITALS
HEART RATE: 79 BPM | SYSTOLIC BLOOD PRESSURE: 130 MMHG | DIASTOLIC BLOOD PRESSURE: 66 MMHG | TEMPERATURE: 98 F | RESPIRATION RATE: 16 BRPM

## 2024-04-25 DIAGNOSIS — M80.00XS AGE-RELATED OSTEOPOROSIS WITH CURRENT PATHOLOGICAL FRACTURE, SEQUELA: Primary | ICD-10-CM

## 2024-04-25 DIAGNOSIS — M80.00XS AGE-RELATED OSTEOPOROSIS WITH CURRENT PATHOLOGICAL FRACTURE, SEQUELA: ICD-10-CM

## 2024-04-25 LAB — CALCIUM SERPL-MCNC: 9.3 MG/DL (ref 8.6–10.4)

## 2024-04-25 PROCEDURE — 96372 THER/PROPH/DIAG INJ SC/IM: CPT

## 2024-04-25 PROCEDURE — 82310 ASSAY OF CALCIUM: CPT

## 2024-04-25 PROCEDURE — 36415 COLL VENOUS BLD VENIPUNCTURE: CPT

## 2024-04-25 PROCEDURE — 6360000002 HC RX W HCPCS

## 2024-04-25 RX ADMIN — ROMOSOZUMAB-AQQG 105 MG: 105 INJECTION, SOLUTION SUBCUTANEOUS at 14:17

## 2024-04-25 RX ADMIN — ROMOSOZUMAB-AQQG 105 MG: 105 INJECTION, SOLUTION SUBCUTANEOUS at 14:18

## 2024-04-25 NOTE — TELEPHONE ENCOUNTER
Medication Management Service ([facility])  [facility]  [phone]     CLINICAL PHARMACY NOTE:    Spoke with patient's PCP (April Morales, PA - CNP) regarding PAP for SGLT2i therapy and CGM eligibility.  Patient has been started on empagliflozin 25mg daily. Provider would like patient to continue with medication but would be too much for patient.      Patient has recently participated in the [employer] PAP program in 2023 (Tresiba, Ozempic, and pen needles).  Patient has not completed a re-enrollment application for 2024.  Yearly income for 2022 was estimated at $40,811.04.  Would need to clarify income for 2024 but with using 2022 numbers for 1 person household,  patient would not be eligible for the BI Cares program for Jardiance.      See income eligibility below:      Patient may meet the [place] Eligibility for Farxiga which is 300% of the FPL.   $45,180 would be the cut-off for a 1 person household.      If patient's income has not change since the above number in 2022, appears that patient would meet the income eligibility for [employer].  Recommended to PCP that patient try to apply with [employer]ME unless income is found to meet BI Cares program for Jardiance.    Additionally PCP had asked if patient would be eligible for CGM.  Patient with [employer] Medicare and on daily.  She would meet the Medicare eligibility of once daily insulin for Medicare.  Destini 3 orders to be submitted by the [employer] platform to [employer].  Phone call to [employer] to that the DME is able to accept patient's insurance.      Above information shared with VALERIE Lala who will follow up with patient regarding need to submit for PAP for  both progams (Lore Nordisk-Tresiba & pen needles/plan to stop Ozempic due to weight loss) and with AZ[employer]ME for Farxiga.  If patient would meet BI Cares eligibility than the plan would be to apply for Jardiance 25mg tablets.  Also plan for MA to update patient regarding orders being sent

## 2024-04-25 NOTE — PROGRESS NOTES
Pt here for Evenity  Arrives ambulatory  Denies complaints/concerns  Labs reviewed, CA 9.3  Tx complete without incident  Pt discharged in stable condition  Returns 5/23 for Evenity.

## 2024-05-02 ENCOUNTER — CLINICAL DOCUMENTATION (OUTPATIENT)
Dept: SPIRITUAL SERVICES | Age: 84
End: 2024-05-02

## 2024-05-06 ENCOUNTER — TELEPHONE (OUTPATIENT)
Dept: FAMILY MEDICINE CLINIC | Age: 84
End: 2024-05-06

## 2024-05-06 NOTE — TELEPHONE ENCOUNTER
Medication Management Service ([facility])  [facility]  [phone]     CLINICAL PHARMACY NOTE:    Spoke with patient's PCP (April Morales, PA - CNP) regarding plan to stop Ozempic and start SGLT2i.  Out of pocket for an SGLT2i is too high for patient.  Patient will need to apply for PAP in order to continue with therapy.  Patient is currently using Jardiance office samples.  Patient continues with Tresiba daily.  Has been approved for PAP with [employer] in the past.  Has not completed re-enrollment application for 2024.    Phone call to patient's daughter in law (Casi) to follow up on patient's eligibility for SGLT2i therapy.  Uncertain as to if patient will meet the income eligibility for the either program      With recent participation in the [employer] PAP program in 2023 (Tresiba, Ozempic, and pen needles).  Yearly income for 2022 was estimated at $40,811.04.  Would need to clarify income for 2024 but with using 2022 numbers for 1 person household, patient would not be eligible for the BI Cares program for Jardiance.       See income eligibility below:       Patient may meet the [place]&[employer] Eligibility for Farxiga which is 300% of the FPL. $45,180 would be the cut-off for a 1 person household.      If patient's income has not changed since the above number in 2022, appears that patient would meet the income eligibility for AZ&ME (Farxiga). Both applications (BI and AZ&ME) mailed to Casi for review and to verify patient's income with her .  Casi will let me know regarding next steps with completing PAP applications.  Anticipating needing to have patient come in and sign applications (SGLT2i (Farxiga vs Jardiance) and Lore Nordisk (Tresiba and pen needles).      Additionally noted that patient has received [employer] CGM supplies late last week.  Casi has not opened the box yet to see what was sent.  Questions regarding the [employer] application.  Appears patient would need to use her cell

## 2024-05-10 ENCOUNTER — TELEPHONE (OUTPATIENT)
Dept: FAMILY MEDICINE CLINIC | Age: 84
End: 2024-05-10

## 2024-05-10 NOTE — TELEPHONE ENCOUNTER
Patient has not put Destini on yet. She just does not want to to use it. To many changes in the last few years. She will continue to check her blood sugar with finger pokes

## 2024-05-14 ENCOUNTER — TELEPHONE (OUTPATIENT)
Dept: FAMILY MEDICINE CLINIC | Age: 84
End: 2024-05-14

## 2024-05-15 NOTE — TELEPHONE ENCOUNTER
Samples set up front, Margarette and I are working on getting this switched to pt assistance in place of Ozempic

## 2024-05-22 ENCOUNTER — TELEPHONE (OUTPATIENT)
Dept: FAMILY MEDICINE CLINIC | Age: 84
End: 2024-05-22

## 2024-05-22 NOTE — TELEPHONE ENCOUNTER
Medication Management Service ([facility])  [facility]  [phone]     An application has been sent for patient assistance program for Farxiga in May 2024 through [employer]ME. Writer called [employer] to confirm application status for patient assistance program for Farxiga. The application center message confirmed that the application is approved, and it is approved until December 31 2024. It can be re-enrolled starting November 1 2024.     Esteban SheltonD  PGY1 Pharmacy Resident-[facility]   Medication Management Service  [phone]      ========================================================================    For Pharmacy Admin Tracking Only    Program: Medical Group  CPA in place:  No  Intervention Detail: Patient Access Assistance/Sample Provided  Time Spent (min): 20

## 2024-05-23 ENCOUNTER — HOSPITAL ENCOUNTER (OUTPATIENT)
Dept: INFUSION THERAPY | Facility: MEDICAL CENTER | Age: 84
End: 2024-05-23

## 2024-05-30 ENCOUNTER — HOSPITAL ENCOUNTER (OUTPATIENT)
Facility: MEDICAL CENTER | Age: 84
Discharge: HOME | End: 2024-05-30
Payer: MEDICARE

## 2024-05-30 ENCOUNTER — HOSPITAL ENCOUNTER (OUTPATIENT)
Dept: INFUSION THERAPY | Facility: MEDICAL CENTER | Age: 84
Discharge: HOME | End: 2024-05-30
Payer: MEDICARE

## 2024-05-30 VITALS
DIASTOLIC BLOOD PRESSURE: 72 MMHG | HEART RATE: 88 BPM | RESPIRATION RATE: 18 BRPM | TEMPERATURE: 98.4 F | SYSTOLIC BLOOD PRESSURE: 121 MMHG

## 2024-05-30 DIAGNOSIS — M80.00XS AGE-RELATED OSTEOPOROSIS WITH CURRENT PATHOLOGICAL FRACTURE, SEQUELA: Primary | ICD-10-CM

## 2024-05-30 DIAGNOSIS — M80.00XS AGE-RELATED OSTEOPOROSIS WITH CURRENT PATHOLOGICAL FRACTURE, SEQUELA: ICD-10-CM

## 2024-05-30 LAB — CALCIUM SERPL-MCNC: 9 MG/DL (ref 8.6–10.4)

## 2024-05-30 PROCEDURE — 96372 THER/PROPH/DIAG INJ SC/IM: CPT

## 2024-05-30 PROCEDURE — 82310 ASSAY OF CALCIUM: CPT

## 2024-05-30 PROCEDURE — 36415 COLL VENOUS BLD VENIPUNCTURE: CPT

## 2024-05-30 PROCEDURE — 6360000002 HC RX W HCPCS

## 2024-05-30 RX ADMIN — ROMOSOZUMAB-AQQG 105 MG: 105 INJECTION, SOLUTION SUBCUTANEOUS at 10:52

## 2024-05-30 NOTE — PROGRESS NOTES
Pt arrives per amb with visitor and CA 9.0 and pt denies any jaw pain or problems with dentures or gums and no jaw pain and states oriented to evenity.  Orders reviewed and pt tolerated injections well and no bleeding at sites and bandaids applied and no reactions or complaints.  Pt discharged per amb with visitor with calendar with next appt.

## 2024-06-03 ENCOUNTER — TELEPHONE (OUTPATIENT)
Dept: FAMILY MEDICINE CLINIC | Age: 84
End: 2024-06-03

## 2024-06-03 NOTE — TELEPHONE ENCOUNTER
Medication Management Service ([facility])  [facility]  [phone]     CLINICAL PHARMACY NOTE:    Office received fax notice from [employer] regarding next shipment of medication being sent on 05/21/2024 to patient's home.  Tracking number looked up and shows deliver on 05/24/2024.  See below:             No additional follow up needed at this time.  Fax uploaded to media.      Margarette Hankins PharmMoniqueD., BCACP  Clinical Pharmacist  [facility] Medication Management Service  [phone]  6/3/2024  5:26 PM      ======================================  For Pharmacy Admin Tracking Only    Program: Medical Group  CPA in place:  No  Intervention Detail: Patient Access Assistance/Sample Provided  Time Spent (min): 10

## 2024-06-10 ENCOUNTER — CLINICAL DOCUMENTATION (OUTPATIENT)
Dept: SPIRITUAL SERVICES | Age: 84
End: 2024-06-10

## 2024-06-10 NOTE — ACP (ADVANCE CARE PLANNING)
Advance Care Planning   Ambulatory ACP Specialist Patient Outreach    Date:  6/10/2024    ACP Specialist:  DEVIN Amezquita    Outreach call to patient in follow-up to ACP Specialist referral from:April Morales APRN - CNP    [] PCP  [] Provider   [] Ambulatory Care Management [] Other     For:                  [x] Advance Directive Assistance              [] Complete Portable DNR order              [] Complete POST/POLST/MOST              [] Code Status Discussion             [] Discuss Goals of Care             [x] Early ACP Decision-Making              [x] Other (Specify) early ACP, pt has packet     Date Referral Received: 4/23/2024     Next Step:   [] ACP scheduled conversation  [] Outreach again in one week               [] Email / Mail ACP Info Sheets  [] Email / Mail Advance Directive   [x] Closing referral.  Routing closure to referring provider/staff and to ACP Specialist .    [] Closure letter mailed to patient with invitation to contact ACP Specialist if / when ready.   [] Other (Specify here):         [x] At this time, Healthcare Decision Maker Is:   Healthcare Decision Maker:    Primary Decision Maker: Odilon Sampson - Child - [phone]          [] Primary agent named in scanned advance directive.    [x] Legal Next of Kin.     [] Unable to determine legal decision maker at this time.    Outreaches:            [x]  Additional Outreach -  Date:  6/10/24   (Specify Dates & special circumstances):    Outcomes: ACP Specialist spoke to patient and confirmed that patient received the [place] DNR order form in the mail. Patient said she is planning to discuss the DNR order with her PCP at her next visit in July.     Patient also shared that she has completed AD documents and plans to have documents notarized before her PCP appointment. Referral will be closed.        Thank you for this referral.

## 2024-06-26 ENCOUNTER — HOSPITAL ENCOUNTER (OUTPATIENT)
Facility: MEDICAL CENTER | Age: 84
End: 2024-06-26

## 2024-06-27 ENCOUNTER — HOSPITAL ENCOUNTER (OUTPATIENT)
Dept: INFUSION THERAPY | Facility: MEDICAL CENTER | Age: 84
End: 2024-06-27

## 2024-07-02 ENCOUNTER — HOSPITAL ENCOUNTER (OUTPATIENT)
Facility: MEDICAL CENTER | Age: 84
Discharge: HOME | End: 2024-07-02
Payer: MEDICARE

## 2024-07-02 ENCOUNTER — HOSPITAL ENCOUNTER (OUTPATIENT)
Dept: INFUSION THERAPY | Facility: MEDICAL CENTER | Age: 84
Discharge: HOME | End: 2024-07-02
Payer: MEDICARE

## 2024-07-02 VITALS — DIASTOLIC BLOOD PRESSURE: 71 MMHG | SYSTOLIC BLOOD PRESSURE: 148 MMHG | HEART RATE: 80 BPM | TEMPERATURE: 98.3 F

## 2024-07-02 DIAGNOSIS — M80.00XS AGE-RELATED OSTEOPOROSIS WITH CURRENT PATHOLOGICAL FRACTURE, SEQUELA: ICD-10-CM

## 2024-07-02 DIAGNOSIS — M80.00XS AGE-RELATED OSTEOPOROSIS WITH CURRENT PATHOLOGICAL FRACTURE, SEQUELA: Primary | ICD-10-CM

## 2024-07-02 LAB — CALCIUM SERPL-MCNC: 9.5 MG/DL (ref 8.6–10.4)

## 2024-07-02 PROCEDURE — 36415 COLL VENOUS BLD VENIPUNCTURE: CPT

## 2024-07-02 PROCEDURE — 82310 ASSAY OF CALCIUM: CPT

## 2024-07-02 PROCEDURE — 96372 THER/PROPH/DIAG INJ SC/IM: CPT

## 2024-07-02 PROCEDURE — 6360000002 HC RX W HCPCS

## 2024-07-02 RX ADMIN — ROMOSOZUMAB-AQQG 105 MG: 105 INJECTION, SOLUTION SUBCUTANEOUS at 14:49

## 2024-07-02 RX ADMIN — ROMOSOZUMAB-AQQG 105 MG: 105 INJECTION, SOLUTION SUBCUTANEOUS at 14:50

## 2024-07-02 NOTE — PROGRESS NOTES
Patient arrived ambulatory with family member for Evenity injections.  Patient denied complaints or concerns, denies dental or jaw issues.  Labs reviewed.  Evenity injections given with band aides applied.  Patient ambulated off unit with family member at discharge. Printed calendar in hand.

## 2024-07-12 DIAGNOSIS — E11.42 DIABETIC POLYNEUROPATHY ASSOCIATED WITH TYPE 2 DIABETES MELLITUS (HCC): ICD-10-CM

## 2024-07-12 RX ORDER — GABAPENTIN 300 MG/1
CAPSULE ORAL
Qty: 270 CAPSULE | Refills: 3 | Status: SHIPPED | OUTPATIENT
Start: 2024-07-12 | End: 2024-10-12

## 2024-07-12 NOTE — TELEPHONE ENCOUNTER
Safia Sampson is calling to request a refill on the following medication(s):    Medication Request:  Requested Prescriptions     Pending Prescriptions Disp Refills    gabapentin (NEURONTIN) 300 MG capsule [Pharmacy Med Name: GABAPENTIN 300 MG Capsule] 270 capsule 3     Sig: TAKE 1 CAPSULE THREE TIMES DAILY       Last Visit Date (If Applicable):  4/23/2024    Next Visit Date:    7/23/2024

## 2024-07-18 ENCOUNTER — TELEPHONE (OUTPATIENT)
Dept: FAMILY MEDICINE CLINIC | Age: 84
End: 2024-07-18

## 2024-07-18 NOTE — TELEPHONE ENCOUNTER
Medication Management Service ([facility])  [facility]  [phone]     CLINICAL PHARMACY NOTE:     Fax received from [employer] regarding next shipment of Farxiga 10 tablets.  Shipment was sent via [employer]  on 07/17/2024 with delivery to patient's home address anticipated in 1-2 business days. Tracking number [id number].  Document uploaded into EPIC.      Margarette Hankins PharmMoniqueD., BCACP  Clinical Pharmacist  [facility] Medication Management Service  [phone]  7/18/2024  6:37 PM      ===========================================  For Pharmacy Admin Tracking Only    Program: Medical Group  CPA in place:  No  Time Spent (min): 10

## 2024-07-23 ENCOUNTER — OFFICE VISIT (OUTPATIENT)
Dept: FAMILY MEDICINE CLINIC | Age: 84
End: 2024-07-23
Payer: MEDICARE

## 2024-07-23 VITALS
SYSTOLIC BLOOD PRESSURE: 122 MMHG | BODY MASS INDEX: 18.44 KG/M2 | TEMPERATURE: 98.6 F | WEIGHT: 96.8 LBS | OXYGEN SATURATION: 97 % | HEART RATE: 81 BPM | DIASTOLIC BLOOD PRESSURE: 62 MMHG

## 2024-07-23 DIAGNOSIS — E78.5 DYSLIPIDEMIA: ICD-10-CM

## 2024-07-23 DIAGNOSIS — Z79.4 TYPE 2 DIABETES MELLITUS WITH DIABETIC POLYNEUROPATHY, WITH LONG-TERM CURRENT USE OF INSULIN (HCC): Primary | ICD-10-CM

## 2024-07-23 DIAGNOSIS — E11.42 TYPE 2 DIABETES MELLITUS WITH DIABETIC POLYNEUROPATHY, WITH LONG-TERM CURRENT USE OF INSULIN (HCC): Primary | ICD-10-CM

## 2024-07-23 DIAGNOSIS — I10 PRIMARY HYPERTENSION: ICD-10-CM

## 2024-07-23 DIAGNOSIS — E78.00 HYPERCHOLESTEROLEMIA: ICD-10-CM

## 2024-07-23 DIAGNOSIS — Z71.89 ACP (ADVANCE CARE PLANNING): ICD-10-CM

## 2024-07-23 LAB — HBA1C MFR BLD: 9.2 %

## 2024-07-23 PROCEDURE — G8427 DOCREV CUR MEDS BY ELIG CLIN: HCPCS

## 2024-07-23 PROCEDURE — 1090F PRES/ABSN URINE INCON ASSESS: CPT

## 2024-07-23 PROCEDURE — 99214 OFFICE O/P EST MOD 30 MIN: CPT

## 2024-07-23 PROCEDURE — 83036 HEMOGLOBIN GLYCOSYLATED A1C: CPT

## 2024-07-23 PROCEDURE — G8399 PT W/DXA RESULTS DOCUMENT: HCPCS

## 2024-07-23 PROCEDURE — 1036F TOBACCO NON-USER: CPT

## 2024-07-23 PROCEDURE — 3074F SYST BP LT 130 MM HG: CPT

## 2024-07-23 PROCEDURE — G8419 CALC BMI OUT NRM PARAM NOF/U: HCPCS

## 2024-07-23 PROCEDURE — 3046F HEMOGLOBIN A1C LEVEL >9.0%: CPT

## 2024-07-23 PROCEDURE — 1123F ACP DISCUSS/DSCN MKR DOCD: CPT

## 2024-07-23 PROCEDURE — 3078F DIAST BP <80 MM HG: CPT

## 2024-07-23 RX ORDER — DAPAGLIFLOZIN 10 MG/1
10 TABLET, FILM COATED ORAL EVERY MORNING
COMMUNITY

## 2024-07-23 RX ORDER — CYCLOSPORINE 0.5 MG/ML
EMULSION OPHTHALMIC
COMMUNITY
Start: 2024-06-04

## 2024-07-23 NOTE — PROGRESS NOTES
Safia Sampson (:  1940) is a 84 y.o. female,Established patient, here for evaluation of the following chief complaint(s):  Diabetes          Subjective   SUBJECTIVE/OBJECTIVE:  Pt here today for DM f/u, accompanied by daughter  VSS    A1C worsened again, 9.2  Pt and her daughter report this is continued poor snacking habits and her sneaking food that is well known to be high in sugar  We had attempted to get CGM in order to monitor glucose closer, pt refused to wear this    Pt assistance for Farxiga was approved, pt is doing well on this  Will stop Ozempic as the home reduced dosing of counting clicks likely was resulting in minimal to no medication delivery  Previously Tresiba was reduced from 16 units AM to 12 d/t some lows in the evening and next morning  Will increase to 14 units daily and monitor from there  Main focus is to avoid hypoglycemia in this pt as she has had multiple falls w/ significant injury in recent past    ACP completed, pt has chosen to change her code status to DNR-CC, signed today and pt to bring copy into office after notary signs living will        Review of Systems       Objective   Physical Exam  Vitals and nursing note reviewed.   Constitutional:       General: She is not in acute distress.     Appearance: Normal appearance. She is not ill-appearing, toxic-appearing or diaphoretic.   Cardiovascular:      Rate and Rhythm: Normal rate and regular rhythm.      Pulses: Normal pulses.      Heart sounds: Normal heart sounds.   Pulmonary:      Effort: Pulmonary effort is normal.      Breath sounds: Normal breath sounds.   Neurological:      Mental Status: She is alert.              Assessment & Plan   ASSESSMENT/PLAN:  1. Type 2 diabetes mellitus with diabetic polyneuropathy, with long-term current use of insulin (HCC)  -above goal, this is all diet related  -pt aware of how much her snacking habits affects her A1C w/ the improvements she has had before w/ no medication changes    -

## 2024-07-31 ENCOUNTER — HOSPITAL ENCOUNTER (OUTPATIENT)
Facility: MEDICAL CENTER | Age: 84
Discharge: HOME | End: 2024-07-31
Payer: MEDICARE

## 2024-07-31 ENCOUNTER — HOSPITAL ENCOUNTER (OUTPATIENT)
Dept: INFUSION THERAPY | Facility: MEDICAL CENTER | Age: 84
Discharge: HOME | End: 2024-07-31
Payer: MEDICARE

## 2024-07-31 VITALS
DIASTOLIC BLOOD PRESSURE: 61 MMHG | SYSTOLIC BLOOD PRESSURE: 128 MMHG | TEMPERATURE: 98.2 F | RESPIRATION RATE: 16 BRPM | HEART RATE: 92 BPM

## 2024-07-31 DIAGNOSIS — M80.00XS AGE-RELATED OSTEOPOROSIS WITH CURRENT PATHOLOGICAL FRACTURE, SEQUELA: Primary | ICD-10-CM

## 2024-07-31 DIAGNOSIS — E11.42 TYPE 2 DIABETES MELLITUS WITH DIABETIC POLYNEUROPATHY, WITH LONG-TERM CURRENT USE OF INSULIN (HCC): ICD-10-CM

## 2024-07-31 DIAGNOSIS — Z79.4 TYPE 2 DIABETES MELLITUS WITH DIABETIC POLYNEUROPATHY, WITH LONG-TERM CURRENT USE OF INSULIN (HCC): ICD-10-CM

## 2024-07-31 DIAGNOSIS — M80.00XS AGE-RELATED OSTEOPOROSIS WITH CURRENT PATHOLOGICAL FRACTURE, SEQUELA: ICD-10-CM

## 2024-07-31 LAB — CALCIUM SERPL-MCNC: 8.8 MG/DL (ref 8.6–10.4)

## 2024-07-31 PROCEDURE — 96372 THER/PROPH/DIAG INJ SC/IM: CPT

## 2024-07-31 PROCEDURE — 82310 ASSAY OF CALCIUM: CPT

## 2024-07-31 PROCEDURE — 6360000002 HC RX W HCPCS

## 2024-07-31 PROCEDURE — 36415 COLL VENOUS BLD VENIPUNCTURE: CPT

## 2024-07-31 RX ORDER — CALCIUM CITRATE/VITAMIN D3 200MG-6.25
TABLET ORAL
Qty: 100 STRIP | Refills: 11 | Status: SHIPPED | OUTPATIENT
Start: 2024-07-31

## 2024-07-31 RX ADMIN — ROMOSOZUMAB-AQQG 105 MG: 105 INJECTION, SOLUTION SUBCUTANEOUS at 13:44

## 2024-07-31 NOTE — TELEPHONE ENCOUNTER
Safia Sampson is calling to request a refill on the following medication(s):    Medication Request:  Requested Prescriptions     Pending Prescriptions Disp Refills    blood glucose test strips (TRUE METRIX BLOOD GLUCOSE TEST) strip [Pharmacy Med Name: TRUE METRIX SELF MONITORING BLOOD GLUCOSE STRIPS   Strip] 100 strip 11     Sig: TEST BLOOD SUGAR AS NEEDED AS DIRECTED       Last Visit Date (If Applicable):  7/23/2024    Next Visit Date:    10/24/24

## 2024-08-28 ENCOUNTER — HOSPITAL ENCOUNTER (OUTPATIENT)
Facility: MEDICAL CENTER | Age: 84
Discharge: HOME | End: 2024-08-28
Payer: MEDICARE

## 2024-08-28 ENCOUNTER — HOSPITAL ENCOUNTER (OUTPATIENT)
Dept: INFUSION THERAPY | Facility: MEDICAL CENTER | Age: 84
Discharge: HOME | End: 2024-08-28
Payer: MEDICARE

## 2024-08-28 VITALS
DIASTOLIC BLOOD PRESSURE: 64 MMHG | HEART RATE: 80 BPM | RESPIRATION RATE: 16 BRPM | TEMPERATURE: 98.1 F | SYSTOLIC BLOOD PRESSURE: 113 MMHG

## 2024-08-28 DIAGNOSIS — M80.00XS AGE-RELATED OSTEOPOROSIS WITH CURRENT PATHOLOGICAL FRACTURE, SEQUELA: ICD-10-CM

## 2024-08-28 DIAGNOSIS — M80.00XS AGE-RELATED OSTEOPOROSIS WITH CURRENT PATHOLOGICAL FRACTURE, SEQUELA: Primary | ICD-10-CM

## 2024-08-28 LAB — CALCIUM SERPL-MCNC: 8.9 MG/DL (ref 8.6–10.4)

## 2024-08-28 PROCEDURE — 36415 COLL VENOUS BLD VENIPUNCTURE: CPT

## 2024-08-28 PROCEDURE — 6360000002 HC RX W HCPCS

## 2024-08-28 PROCEDURE — 82310 ASSAY OF CALCIUM: CPT

## 2024-08-28 PROCEDURE — 96372 THER/PROPH/DIAG INJ SC/IM: CPT

## 2024-08-28 RX ADMIN — ROMOSOZUMAB-AQQG 105 MG: 105 INJECTION, SOLUTION SUBCUTANEOUS at 13:59

## 2024-08-28 NOTE — PROGRESS NOTES
Pt arrives per amb with visitor and labs and orders reviewed and CA 8.9 today.  Pt denies any jaw pain or recent or planned dental work or any problems with gums and pt tolerated injections well and no bleeding at sites and no reactions or complaints and pt given calendar with next appt and pt discharged per amb with visitor.

## 2024-09-25 ENCOUNTER — HOSPITAL ENCOUNTER (OUTPATIENT)
Facility: MEDICAL CENTER | Age: 84
Discharge: HOME | End: 2024-09-25
Payer: MEDICARE

## 2024-09-25 ENCOUNTER — HOSPITAL ENCOUNTER (OUTPATIENT)
Dept: INFUSION THERAPY | Facility: MEDICAL CENTER | Age: 84
Discharge: HOME | End: 2024-09-25
Payer: MEDICARE

## 2024-09-25 VITALS
SYSTOLIC BLOOD PRESSURE: 126 MMHG | DIASTOLIC BLOOD PRESSURE: 65 MMHG | TEMPERATURE: 98.4 F | RESPIRATION RATE: 18 BRPM | HEART RATE: 67 BPM

## 2024-09-25 DIAGNOSIS — M80.00XS AGE-RELATED OSTEOPOROSIS WITH CURRENT PATHOLOGICAL FRACTURE, SEQUELA: ICD-10-CM

## 2024-09-25 DIAGNOSIS — M80.00XS AGE-RELATED OSTEOPOROSIS WITH CURRENT PATHOLOGICAL FRACTURE, SEQUELA: Primary | ICD-10-CM

## 2024-09-25 LAB — CALCIUM SERPL-MCNC: 9.2 MG/DL (ref 8.6–10.4)

## 2024-09-25 PROCEDURE — 6360000002 HC RX W HCPCS

## 2024-09-25 PROCEDURE — 36415 COLL VENOUS BLD VENIPUNCTURE: CPT

## 2024-09-25 PROCEDURE — 82310 ASSAY OF CALCIUM: CPT

## 2024-09-25 PROCEDURE — 96372 THER/PROPH/DIAG INJ SC/IM: CPT

## 2024-09-25 RX ADMIN — ROMOSOZUMAB-AQQG 105 MG: 105 INJECTION, SOLUTION SUBCUTANEOUS at 13:44

## 2024-09-25 NOTE — PROGRESS NOTES
Patient here for Evenity injecvtions  Arrives ambulatory with daughter  Denies complaint/concern  Denies jaw/dental issues   Labs reviewed Ca 9.2  Evenity given without incident;band-aids applied to sites  Pt discharged with family & will schedule next appt at

## 2024-09-26 ENCOUNTER — TELEPHONE (OUTPATIENT)
Dept: FAMILY MEDICINE CLINIC | Age: 84
End: 2024-09-26

## 2024-09-26 DIAGNOSIS — M80.00XD AGE-RELATED OSTEOPOROSIS WITH CURRENT PATHOLOGICAL FRACTURE WITH ROUTINE HEALING, SUBSEQUENT ENCOUNTER: ICD-10-CM

## 2024-09-26 RX ORDER — ROMOSOZUMAB-AQQG 105 MG/1.17ML
210 INJECTION, SOLUTION SUBCUTANEOUS ONCE
Qty: 2.24 ML | Refills: 2 | Status: SHIPPED | OUTPATIENT
Start: 2024-09-26 | End: 2024-09-26

## 2024-10-21 ENCOUNTER — TELEPHONE (OUTPATIENT)
Dept: FAMILY MEDICINE CLINIC | Age: 84
End: 2024-10-21

## 2024-10-21 NOTE — TELEPHONE ENCOUNTER
Medication Management Service ([facility])  [facility]  [phone]     CLINICAL PHARMACY NOTE:      Fax received from [employer] regarding next shipment of Farxiga which is expected to be delivered on 09/12/2024 to patient's home address. Fax uploaded into the chart.    Margarette Hankins PharmMoniqueD., BCACP  Clinical Pharmacist  [facility] Medication Management Service  [phone]  10/21/2024  5:36 PM      ========================================  For Pharmacy Admin Tracking Only    Program: Medical Group  CPA in place:  No   Time Spent (min): 10

## 2024-10-24 ENCOUNTER — OFFICE VISIT (OUTPATIENT)
Dept: FAMILY MEDICINE CLINIC | Age: 84
End: 2024-10-24

## 2024-10-24 ENCOUNTER — HOSPITAL ENCOUNTER (OUTPATIENT)
Age: 84
Setting detail: SPECIMEN
Discharge: HOME | End: 2024-10-24

## 2024-10-24 VITALS
BODY MASS INDEX: 18.96 KG/M2 | DIASTOLIC BLOOD PRESSURE: 64 MMHG | OXYGEN SATURATION: 96 % | TEMPERATURE: 97.3 F | HEIGHT: 61 IN | SYSTOLIC BLOOD PRESSURE: 122 MMHG | WEIGHT: 100.4 LBS | HEART RATE: 81 BPM

## 2024-10-24 DIAGNOSIS — M80.00XD AGE-RELATED OSTEOPOROSIS WITH CURRENT PATHOLOGICAL FRACTURE WITH ROUTINE HEALING, SUBSEQUENT ENCOUNTER: ICD-10-CM

## 2024-10-24 DIAGNOSIS — Z79.4 TYPE 2 DIABETES MELLITUS WITH DIABETIC POLYNEUROPATHY, WITH LONG-TERM CURRENT USE OF INSULIN (HCC): Primary | ICD-10-CM

## 2024-10-24 DIAGNOSIS — E11.42 TYPE 2 DIABETES MELLITUS WITH DIABETIC POLYNEUROPATHY, WITH LONG-TERM CURRENT USE OF INSULIN (HCC): Primary | ICD-10-CM

## 2024-10-24 DIAGNOSIS — R74.8 ELEVATED LIVER ENZYMES: ICD-10-CM

## 2024-10-24 DIAGNOSIS — F10.10 ALCOHOL ABUSE: ICD-10-CM

## 2024-10-24 LAB
ALBUMIN SERPL-MCNC: 4.2 G/DL (ref 3.5–5.2)
ALBUMIN/GLOB SERPL: 1 {RATIO} (ref 1–2.5)
ALP SERPL-CCNC: 92 U/L (ref 35–104)
ALT SERPL-CCNC: 65 U/L (ref 10–35)
AST SERPL-CCNC: 73 U/L (ref 10–35)
BILIRUB DIRECT SERPL-MCNC: 0.2 MG/DL (ref 0–0.2)
BILIRUB INDIRECT SERPL-MCNC: 0.2 MG/DL (ref 0–1)
BILIRUB SERPL-MCNC: 0.4 MG/DL (ref 0–1.2)
GLOBULIN SER CALC-MCNC: 3 G/DL
HBA1C MFR BLD: 9.5 %
PROT SERPL-MCNC: 7.2 G/DL (ref 6.6–8.7)

## 2024-10-24 RX ORDER — ACETAMINOPHEN 160 MG
2000 TABLET,DISINTEGRATING ORAL DAILY
COMMUNITY

## 2024-10-24 RX ORDER — ROMOSOZUMAB-AQQG 105 MG/1.17ML
210 INJECTION, SOLUTION SUBCUTANEOUS ONCE
Qty: 2.24 ML | Refills: 0 | Status: SHIPPED | OUTPATIENT
Start: 2024-10-24 | End: 2024-10-24

## 2024-10-24 NOTE — PROGRESS NOTES
quittin.0    Smokeless tobacco: Never   Vaping Use    Vaping status: Never Used   Substance and Sexual Activity    Alcohol use: Yes     Comment: 4 shots of coconut rum with 1 16 ounce beer, every three nights.    Drug use: No     Social Determinants of Health     Financial Resource Strain: Low Risk  (2024)    Overall Financial Resource Strain (CARDIA)     Difficulty of Paying Living Expenses: Not hard at all   Food Insecurity: No Food Insecurity (2024)    Hunger Vital Sign     Worried About Running Out of Food in the Last Year: Never true     Ran Out of Food in the Last Year: Never true   Transportation Needs: Unknown (2024)    PRAPARE - Transportation     Lack of Transportation (Non-Medical): No   Physical Activity: Insufficiently Active (2024)    Exercise Vital Sign     Days of Exercise per Week: 1 day     Minutes of Exercise per Session: 10 min   Stress: No Stress Concern Present (2020)    Received from [facility]    [place] [place] [employer] - Occupational Stress Questionnaire     Feeling of Stress : Not at all   Social Connections: Moderately Integrated (2020)    Received from [employer] [employer]    Social Connection and Isolation Panel [NHANES]     Frequency of Communication with Friends and Family: More than three times a week     Frequency of Social Gatherings with Friends and Family: Never     Attends Scientologist Services: 1 to 4 times per year     Active Member of Clubs or Organizations: No     Attends Club or Organization Meetings: Never     Marital Status:     [facility] from [facility]    [place] Safety & Environment   Housing Stability: Unknown (2024)    Housing Stability Vital Sign     Unstable Housing in the Last Year: No        Results  Laboratory Studies  A1c is 9.5.      PHYSICAL EXAM     /64 (Site: Left Upper Arm, Position:

## 2024-10-31 ENCOUNTER — TELEPHONE (OUTPATIENT)
Dept: FAMILY MEDICINE CLINIC | Age: 84
End: 2024-10-31

## 2024-10-31 DIAGNOSIS — M80.00XD AGE-RELATED OSTEOPOROSIS WITH CURRENT PATHOLOGICAL FRACTURE WITH ROUTINE HEALING, SUBSEQUENT ENCOUNTER: Primary | ICD-10-CM

## 2024-10-31 RX ORDER — ROMOSOZUMAB-AQQG 105 MG/1.17ML
210 INJECTION, SOLUTION SUBCUTANEOUS ONCE
Qty: 2.24 ML | Refills: 0 | Status: SHIPPED | OUTPATIENT
Start: 2024-10-31 | End: 2024-10-31

## 2024-11-12 DIAGNOSIS — M80.00XS AGE-RELATED OSTEOPOROSIS WITH CURRENT PATHOLOGICAL FRACTURE, SEQUELA: Primary | ICD-10-CM

## 2024-11-12 RX ORDER — DENOSUMAB 60 MG/ML
60 INJECTION SUBCUTANEOUS ONCE
Qty: 1 ML | Refills: 0 | Status: SHIPPED | OUTPATIENT
Start: 2024-11-12 | End: 2024-11-12

## 2024-11-13 ENCOUNTER — TELEPHONE (OUTPATIENT)
Dept: FAMILY MEDICINE CLINIC | Age: 84
End: 2024-11-13

## 2024-11-13 NOTE — TELEPHONE ENCOUNTER
[facility] called and they stated they would need an order for calcium and creatinine as well. I asked if she could use the same calcium order that is already being addressed and she stated as long as it gets entered as a standing order as the prolia injection is scheduled for the 19th.

## 2024-11-14 DIAGNOSIS — M81.0 AGE-RELATED OSTEOPOROSIS WITHOUT CURRENT PATHOLOGICAL FRACTURE: Primary | ICD-10-CM

## 2024-11-14 NOTE — TELEPHONE ENCOUNTER
I called [facility] to let them know the orders have been placed as requested and spoke with Arian

## 2024-11-15 ENCOUNTER — TELEPHONE (OUTPATIENT)
Dept: INFUSION THERAPY | Facility: MEDICAL CENTER | Age: 84
End: 2024-11-15

## 2024-11-15 NOTE — TELEPHONE ENCOUNTER
New order 11/13/24  Dr. Maria C Anand  Prolia 60 mg SQ  Order noted and chart to front office for processing.

## 2024-11-19 ENCOUNTER — HOSPITAL ENCOUNTER (OUTPATIENT)
Dept: INFUSION THERAPY | Facility: MEDICAL CENTER | Age: 84
End: 2024-11-19

## 2024-11-20 ENCOUNTER — HOSPITAL ENCOUNTER (OUTPATIENT)
Dept: INFUSION THERAPY | Facility: MEDICAL CENTER | Age: 84
End: 2024-11-20

## 2024-11-21 ENCOUNTER — TELEPHONE (OUTPATIENT)
Dept: ONCOLOGY | Age: 84
End: 2024-11-21

## 2024-11-21 NOTE — TELEPHONE ENCOUNTER
I TRIED TO CALL [facility] TO SCHEDULE HER LAB WORK AND PROLIA AND HAD TO LEAVE A MESSAGE TO CALL THE OFFICE TO SCHEDULE.

## 2024-11-22 NOTE — TELEPHONE ENCOUNTER
I CALLED AND LEFT AISHWARYA A MESSAGE TO CALL AND SCHEDULE HER LABS AND PROLIA INJECTION AND HAD TO LEAVE A MESSAGE TO CALL THE OFFICE TO SCHEDULE.

## 2024-11-23 ENCOUNTER — HOSPITAL ENCOUNTER (OUTPATIENT)
Facility: MEDICAL CENTER | Age: 84
End: 2024-11-23

## 2024-11-25 ENCOUNTER — HOSPITAL ENCOUNTER (OUTPATIENT)
Dept: INFUSION THERAPY | Facility: MEDICAL CENTER | Age: 84
Discharge: HOME | End: 2024-11-25

## 2024-11-26 ENCOUNTER — TELEPHONE (OUTPATIENT)
Dept: FAMILY MEDICINE CLINIC | Age: 84
End: 2024-11-26

## 2024-11-26 NOTE — TELEPHONE ENCOUNTER
Medication Management Service ([facility])  [facility]  [phone]    24 6:20 PM    Pharmacist Consult - Medication Patient Assistance Program    Patient's PCP: Maria C Anand MD    Verbal order from provider to complete PAP for patient    PCP Office:   [place] [facility] [address], [place] [address]  Pharmacist Phone: [phone]  Clinic Phone: [phone]    Safia Sampson is a 84 y.o. female. PharmD working with patient to complete the  Re-Enrollment application for Tresiba 200units/mL pens and pen needles through [employer] (P: [phone]).  Directions:   Tresiba 200units/mL-Inject 14 units into the skin once daily  Use a pen needle for each insulin injection    : 1940  Patient Phone: [phone]  Patient Address:   [address] [place] [address]   [place], [place] [address]    Application Type: Renewal for     Application completed on behalf of patient and completed online. Received consent from patient to sign application on their behalf..    Patient enrolled in PAP online through manufacturers website    Patient Section:        Provider Section:          Patient understands that she is to contact PharmD with any updates received regarding her re-enrollment into the program.  Patient verbalized understanding and had no further questions or concerns on this for me at this time.    Margarette Koo, Alfred, BCACP  =====================================================  For Pharmacy Admin Tracking Only    Program: Medical Group  CPA in place:  Yes  Recommendation Provided To: Patient/Caregiver: 1 via Telephone  Intervention Detail: Patient Access Assistance/Sample Provided  Intervention Accepted By: Patient/Caregiver: 1  Time Spent (min): 30

## 2024-11-26 NOTE — TELEPHONE ENCOUNTER
Medication Management Service ([facility])  [phone]     CLINICAL PHARMACY NOTE:    Spoke with patient by telephone.  Patient confirms that she is currently taking Farxiga 10mg daily and receives from [facility].  Patient is also taking Tresiba 14 units daily.  Pt receives both Tresiba and pen needles from [employer].      Plan to complete the [employer] Re-Enrollment online.  [facility] will need to be completed by paper form. Patient expresses understanding that she will need to come into the office to sign the [employer] application.  PharmD to contact patient once the [employer] application is ready for her to review and sign.       Margarette Hankins, Pharm.D., BCACP  Clinical Pharmacist  [facility] Medication Management Service  [phone]  11/26/2024  4:05 PM     hide

## 2024-11-26 NOTE — TELEPHONE ENCOUNTER
Medication Management Service ([facility])  [facility]  [phone]    24 5:25 PM    Pharmacist Consult - Medication Patient Assistance Program    Patient's PCP: Maria C Anand MD      PCP Office:   [address], [place] [address]  Pharmacist Phone: [phone]  Clinic Phone: [phone]    Safia Sampson is a 84 y.o. female. PharmD working with patient to complete the  Re-Enrollment application for Farxiga through [employer] (P: [phone]).  Directions:   Farxiga 10mg tablets-Take 1 tablet by mouth daily    : 1940  Patient Phone: [phone]  Patient Address:   [address] [place] [address]   [place] [address]    Application Type: Renewal for     Application completed with patient and completed on paper. Patient signed application in office.    Application filled out  Patient will need to come into the office to sign application.  PharmD to call patient to set up time to come in .      Patient verbalized understanding and had no further questions or concerns on this for me at this time.    Margarette Koo, Alfred, BCACP  =====================================================  For Pharmacy Admin Tracking Only    Program: Medical Group  CPA in place:  No  Recommendation Provided To: Patient/Caregiver: 1 via Telephone  Intervention Detail: Patient Access Assistance/Sample Provided  Intervention Accepted By: Patient/Caregiver: 1  Time Spent (min): 30

## 2024-12-05 ENCOUNTER — HOSPITAL ENCOUNTER (OUTPATIENT)
Facility: MEDICAL CENTER | Age: 84
End: 2024-12-05

## 2024-12-10 ENCOUNTER — HOSPITAL ENCOUNTER (OUTPATIENT)
Dept: INFUSION THERAPY | Facility: MEDICAL CENTER | Age: 84
End: 2024-12-10

## 2025-01-03 DIAGNOSIS — E78.00 HYPERCHOLESTEROLEMIA: ICD-10-CM

## 2025-01-03 RX ORDER — ATORVASTATIN CALCIUM 10 MG/1
10 TABLET, FILM COATED ORAL DAILY
Qty: 90 TABLET | Refills: 3 | Status: SHIPPED | OUTPATIENT
Start: 2025-01-03

## 2025-01-03 NOTE — TELEPHONE ENCOUNTER
Safia Sampson is calling to request a refill on the following medication(s):    Medication Request:  Requested Prescriptions     Pending Prescriptions Disp Refills    atorvastatin (LIPITOR) 10 MG tablet 90 tablet 3     Sig: Take 1 tablet by mouth daily       Last Visit Date (If Applicable):  10/24/2024    Next Visit Date:    1/24/2025

## 2025-01-07 ENCOUNTER — TELEPHONE (OUTPATIENT)
Dept: FAMILY MEDICINE CLINIC | Age: 85
End: 2025-01-07

## 2025-01-07 NOTE — TELEPHONE ENCOUNTER
[facility] will not ship patients tresiba because they are saying she has to pay 95.00 in order to get it and she is on patient assistance. Please advise

## 2025-01-24 ENCOUNTER — OFFICE VISIT (OUTPATIENT)
Dept: FAMILY MEDICINE CLINIC | Age: 85
End: 2025-01-24

## 2025-01-24 VITALS
HEART RATE: 81 BPM | OXYGEN SATURATION: 96 % | WEIGHT: 101.2 LBS | SYSTOLIC BLOOD PRESSURE: 134 MMHG | BODY MASS INDEX: 19.28 KG/M2 | TEMPERATURE: 98 F | DIASTOLIC BLOOD PRESSURE: 66 MMHG

## 2025-01-24 DIAGNOSIS — Z13.1 SCREENING FOR DIABETES MELLITUS: ICD-10-CM

## 2025-01-24 DIAGNOSIS — R74.8 ELEVATED LIVER ENZYMES: Primary | ICD-10-CM

## 2025-01-24 DIAGNOSIS — M81.0 AGE-RELATED OSTEOPOROSIS WITHOUT CURRENT PATHOLOGICAL FRACTURE: ICD-10-CM

## 2025-01-24 DIAGNOSIS — Z13.21 ENCOUNTER FOR VITAMIN DEFICIENCY SCREENING: ICD-10-CM

## 2025-01-24 DIAGNOSIS — Z13.220 SCREENING FOR LIPID DISORDERS: ICD-10-CM

## 2025-01-24 DIAGNOSIS — E11.42 TYPE 2 DIABETES MELLITUS WITH DIABETIC POLYNEUROPATHY, WITH LONG-TERM CURRENT USE OF INSULIN (HCC): ICD-10-CM

## 2025-01-24 DIAGNOSIS — F10.10 ALCOHOL ABUSE: ICD-10-CM

## 2025-01-24 DIAGNOSIS — Z79.4 TYPE 2 DIABETES MELLITUS WITH DIABETIC POLYNEUROPATHY, WITH LONG-TERM CURRENT USE OF INSULIN (HCC): ICD-10-CM

## 2025-01-24 DIAGNOSIS — J44.9 CHRONIC OBSTRUCTIVE PULMONARY DISEASE, UNSPECIFIED COPD TYPE (HCC): ICD-10-CM

## 2025-01-24 DIAGNOSIS — Z13.0 SCREENING FOR DEFICIENCY ANEMIA: ICD-10-CM

## 2025-01-24 DIAGNOSIS — Z13.29 SCREENING FOR THYROID DISORDER: ICD-10-CM

## 2025-01-24 DIAGNOSIS — M80.00XS AGE-RELATED OSTEOPOROSIS WITH CURRENT PATHOLOGICAL FRACTURE, SEQUELA: ICD-10-CM

## 2025-01-24 LAB — HBA1C MFR BLD: 9.1 %

## 2025-01-24 RX ORDER — DENOSUMAB 60 MG/ML
INJECTION SUBCUTANEOUS
Qty: 1 ML | Refills: 11 | Status: SHIPPED | OUTPATIENT
Start: 2025-01-24

## 2025-01-24 SDOH — ECONOMIC STABILITY: FOOD INSECURITY: WITHIN THE PAST 12 MONTHS, YOU WORRIED THAT YOUR FOOD WOULD RUN OUT BEFORE YOU GOT MONEY TO BUY MORE.: NEVER TRUE

## 2025-01-24 SDOH — ECONOMIC STABILITY: FOOD INSECURITY: WITHIN THE PAST 12 MONTHS, THE FOOD YOU BOUGHT JUST DIDN'T LAST AND YOU DIDN'T HAVE MONEY TO GET MORE.: NEVER TRUE

## 2025-01-24 ASSESSMENT — PATIENT HEALTH QUESTIONNAIRE - PHQ9
SUM OF ALL RESPONSES TO PHQ QUESTIONS 1-9: 0
SUM OF ALL RESPONSES TO PHQ9 QUESTIONS 1 & 2: 0
SUM OF ALL RESPONSES TO PHQ QUESTIONS 1-9: 0
SUM OF ALL RESPONSES TO PHQ QUESTIONS 1-9: 0
1. LITTLE INTEREST OR PLEASURE IN DOING THINGS: NOT AT ALL
SUM OF ALL RESPONSES TO PHQ QUESTIONS 1-9: 0
2. FEELING DOWN, DEPRESSED OR HOPELESS: NOT AT ALL

## 2025-01-25 NOTE — PROGRESS NOTES
[facility]  [facility]  [address]  [place] [address]     Date of Visit:  2025  Patient Name: Safia Sampson   Patient :  1940     CHIEF COMPLAINT:     Safia Sampson is a 84 y.o. female who presents today for an general visit to be evaluated for the following condition(s):  Chief Complaint   Patient presents with    Diabetes       REVIEW OF SYSTEM      Review of Systems    HISTORY OF PRESENT ILLNESS     History of Present Illness  The patient presents for evaluation of diabetes mellitus, osteoporosis, and elevated liver enzymes. She is accompanied by her daughter-in-law.    She reports a generally good health status, with the exception of her A1c levels, which have been elevated due to dietary indiscretions during the holiday season. She acknowledges the need to reduce her sugar intake and expresses confidence in her ability to do so. She has been adhering to a daily insulin regimen of 14 units, which she has been on for an extended period. Her blood glucose levels have recently ranged from 89 to 160, with the highest recorded level being 160. She has been making efforts to reduce her alcohol consumption, limiting her intake to 3 fingers of rum every 3 to 4 nights, typically consumed at bedtime, and has significantly reduced her beer consumption. She admits to struggling with a sweet tooth, particularly during the holiday season. She has been monitoring her blood glucose levels before meals and maintains a log of these readings. She has been using non-sugar creamer in her coffee and has been consuming honey nut cereals. She has been receiving Tresiba through patient assistance, with her daughter-in-law typically collecting it on her behalf. She is currently awaiting a refill of her Tresiba prescription.    She has been receiving Prolia injections for her osteoporosis, initially on a monthly basis, but this was later changed to a quarterly schedule. However, she has

## 2025-01-27 DIAGNOSIS — E11.42 TYPE 2 DIABETES MELLITUS WITH DIABETIC POLYNEUROPATHY, WITH LONG-TERM CURRENT USE OF INSULIN (HCC): ICD-10-CM

## 2025-01-27 DIAGNOSIS — Z79.4 TYPE 2 DIABETES MELLITUS WITH DIABETIC POLYNEUROPATHY, WITH LONG-TERM CURRENT USE OF INSULIN (HCC): ICD-10-CM

## 2025-01-27 NOTE — TELEPHONE ENCOUNTER
Marlena Sosa  [address] [facility] [address]    Dr. Flako Sparks  [address] [place] [address]  ** Report to entrance 6 on the 1st Floor, [facility]    Date of Procedure: 10/27/23  Check in at: 1:30 PM   Procedure at: 3:00 PM    Please call the GI clinic at [phone] with any questions.  If you need to cancel or reschedule your procedure for any reason, please contact us at least  3 days prior to your procedure.    Your laxative prescription Nulytely  may be picked up from:  [facility]  At the time your procedure is scheduled the prescription has been sent to your pharmacy of choice. If your prescription is not picked up within 1 week, the prescription will be placed on file at the pharmacy.  Please ask the pharmacy to prepare medication that is on file prior to calling the clinic.    Colonoscopy Preparation Instructions    Please follow these instructions. Disregard instructions that are provided on the medication package.    **Please make arrangements for a responsible adult to drive you home. (A responsible adult is someone age 18 or older who can receive and understand instructions, stay with you, and call for assistance as instructed).**     Regarding Your Medications Prior to Your Procedure:    If these medication instructions are not followed, your procedure may need to be canceled for your own safety.     IF YOU TAKE ANY OF THESE MEDICATIONS YOU MUST HOLD THEM FOR A FULL 7 DAYS PRIOR TO THE PROCEDURE:  semaglutide (Ozempic, Wegovy, Rybelsus), dulaglutide (Trulicity), liraglutide (Victoza), exanatide (Byetta), or tirzepatide (Mounjaro).  If you take the medication Phentermine (Adipex-P or Lomaira), do not take it for 7 days prior to your procedure.  Do not take aspirin or aspirin-containing products for 7 days prior to the procedure if used for pain relief or preventative measures. If you have coronary artery disease, recent stent placement, or history of    Safia Sampson is calling to request a refill on the following medication(s):    Medication Request:  Requested Prescriptions     Pending Prescriptions Disp Refills    Insulin Degludec 200 UNIT/ML SOPN 9 mL 0     Sig: Inject 16 Units into the skin daily . See voucher codes for billing in below notes.       Last Visit Date (If Applicable):  1/24/2025    Next Visit Date:    2/26/2025    Patient has been approved for PAP through [employer] for 2025 for both Tresiba and pen needles.  Phone call to [employer].  Unclear why medication has not shipped.  Medication will begin to process today.  [employer] offered voucher to patient.  New prescription will need to be sent to the pharmacy for both Tresiba and pen needles.  Voucher information listed below.    BIN: [id number]  PCN: [id number]  GRP: [id number]  ID:  Tresiba: [id number]  Pen Needles:  [id number]    Above information shared with patient.  Patient is not in need of pen needles.  Requesting only that a prescription for Tresiba be sent to the pharmacy.    Margarette Hankins, Pharm.D., [facility]  Clinical Pharmacist  [facility] Medication Management Service  [phone]  1/27/2025  12:28 PM      ===============================================  For Pharmacy Admin Tracking Only    Program: Medical Group  CPA in place:  No  Recommendation Provided To: Patient/Caregiver: 1 via Telephone  Intervention Detail: Patient Access Assistance/Sample Provided  Intervention Accepted By: Patient/Caregiver: 1  Time Spent (min):  40                stroke then you can continue aspirin and only hold it the day of the procedure.  Do not take meloxicam and naproxen (Aleve) for 96 hours prior to your procedure.  Do not take short acting anti-inflammatory medications such as ibuprofen (Advil, Motrin), diclofenac, or ketorolac for 24 hours prior to your procedure.    Do not take naltrexone for 3 days prior to your procedure.  Do not take cannabidiol (CBD) products for 3 days prior to the procedure. Do not smoke marijuana for 2 weeks prior to the procedure.  If you take diabetic medication:  Please contact the GI clinic if you begin taking any new diabetic medications or if you take insulin and instructions are not listed.  Supplement Instructions:  Do not take any iron or iron-containing multivitamins beginning five days prior to your procedure.     The Day Before the Procedure:    Start a clear liquid diet at breakfast. Continue a clear liquid diet for the entire day in unlimited amounts. Clear liquids are listed below as follows:    Clear Liquid Diet:  Do not consume anything red or purple.  Beverages: soft drinks/soda, Gatorade or Daniel-Aid, clear fruit juices without pulp, water, tea, coffee (no milk or non dairy creamer).  Broths: chicken, beef or vegetable.  Desserts: hard candies, Jell-O, Popsicles. (No fruit bars or sherbet)    In the morning, add tap water to the laxative container, shake well and place in the refrigerator.   Lemonade flavor Crystal Lite mix, obtained at the grocery store, can also be used to improve the flavor. Once water is added, the solution is only good for 48 hours.   At approximately 5:00 PM (or when you are home for the evening), begin drinking the Nulytely solution. Drink 8 oz. every 20 minutes until you have consumed half of the gallon of the solution.   Walking will help the laxative move through the colon.     The Day of the Procedure:    At 7:00 AM begin drinking the remaining Nulytely solution. Drink 8 oz. every 10 minutes  until you have consumed the remainder of the gallon of the solution.   You may take your morning medications with a sip of water, unless otherwise directed.    You may continue clear liquids until 4 hours before the procedure start time.   Do not have anything by mouth after 11:00 AM.    If stools are not clear yellow the morning of the procedure, please call the GI lab at [phone].              About Your Colonoscopy  What is colonoscopy?    Colonoscopy is a procedure that allows your doctor to clearly see the lining of your colon (large bowel). A flexible tube, about the thickness of your finger, is put into the rectum and moved slowly through the entire colon. A special camera in the tube allows the doctor to see any problem areas in the colon.    Why is a colonoscopy needed?    A colonoscopy can be done:     1.   As a screening test for colon cancer. The American Cancer Society recommends colon screening for every adult starting at age 50, sometimes earlier, if you have a family history of colon cancer or polyps.   Ask your doctor when you should be screened.     2.  To find out what is causing symptoms such as rectal bleeding or changes in bowel habits (X-rays alone may not find the problem).    3.  As a regular follow-up exam for patients with previous polyps, colon cancer, or a family history of colon cancer.     How do I prepare for the colonoscopy?    For the best possible exam, the colon must be completely empty. Your doctor will give you detailed instructions for a cleansing routine and diet to follow. Or, you will be told what time to arrive at the hospital to begin the cleansing routine before your colonoscopy.    Can I take my medicines?    Most medicines can be taken as usual, but some can interfere with the preparation or the exam. Please talk with your doctor at least a week before the exam. Ask about taking your medicines, especially if you take aspirin products, anticoagulants (blood  thinners), arthritis or blood pressure medicines, insulin or iron products.    What happens during the colonoscopy?    Your doctor will give you medicine through a vein in your arm to help you relax and stay comfortable during the exam. You will lie on your side or on your back while the flexible tube is moved slowly through the large bowel. As the tube is slowly withdrawn, the doctor looks at the lining of the large bowel. You may feel some cramping or gas but the medicine should keep you comfortable. The exam usually takes about 20 to 30 minutes.          What is a polypectomy?    Sometimes polyps are found during a colonoscopy.     Polyps are abnormal growths of tissue found on the colon lining. If your doctor thinks a polyp should be removed, a small wire loop or snare will be passed through the tube and the polyp will be cut out.  You will not feel this. Most polyps are benign (not cancer). But some may contain an area of cancer or may develop into cancer.     Removal of colon polyps is an important way to prevent colon cancer. People who have had a history of polyps may need to have follow-up colonoscopies to check if new polyps have formed. These follow-up exams usually occur in one to five years of your first exam (your doctor will tell you when you need to schedule another exam). Some people who have large polyps or cancerous polyps may need to have surgery. Your doctor will educate and prepare you for this step, if needed.    What happens after the colonoscopy?     Your doctor will explain the results to you.    You may have some cramping or bloating because of the air put into the colon during the exam. This should go away quickly with passage of gas.    Generally, you should be able to eat and drink as usual after the exam.     If you were given medicines to help you relax during the exam, someone must take you home.  For your own safety, please have a friend or family member drive you. If you do not  have a ride home, your procedure may need to be rescheduled.    Going home      You should not drive or operate any machinery for 24 hours. Even if you feel alert, your judgment and reflexes may be slower from the sedative medicine.    Do not make legally binding decisions for the next 24 hours.    Do not drink alcohol for the next 24 hours.      Are there complications of colonoscopy?    Complications after a colonoscopy are rare, but can occur.     What are the risks of colonoscopy?    While this is a relatively safe and routine procedure, there are risks associated with it.  The average risk of potential complications is reported to be less than 1%.  Please note this percent is NOT zero.  Complications can and do occur.    These can include, but are not limited to:    Perforation:  A tear or hole in the colon.  Average risk is generally less than 0.1%.  Emergency surgery may be required to repair this.  Bleeding after the removal of a polyp can occur, generally less than 0.5% risk.  This will often stop on its own, but may require blood transfusion, repeat colonoscopy, or surgery.  Risk of infection or injury to internal organs is extremely low.  Complications from the sedation, which can include (and are not limited to): breathing or blood pressure problems, pneumonia infection, heart problems or heart attack, stroke, etc.  While there are risks as mentioned above, having a colonoscopy to identify and remove polyps can prevent up to 90% of colorectal cancers.    When should I call the doctor?    Although complications after colonoscopy are not common, it is important to know the early signs of any possible complication. Call the doctor who performed your colonoscopy if you notice:     Severe abdominal pain   Fever and chills   Bloody bowel movement; bleeding can occur several days after polyp removal   Distended and hard abdomen   If you have any questions or concerns    Need more Information?    Please talk  with your doctor or the office staff if you have questions about the colonoscopy.  For any questions regarding cost, billing and insurance coverage, please call [phone].  If you have questions that have not been answered, please discuss them with the nurse or doctor before the exam begins.                                                          About Your EGD    What is an EGD?    EGD stands for “esophagogastroduodenoscopy.”  It is a test in which your doctor can look closely at the upper part of your digestive tract:  Esophagus (tube that sends food from mouth to stomach)  Stomach  Duodenum (first part of the small bowel)    An EGD is done using a long, thin flexible tube called an endoscope.  So, this test is also known as “upper endoscopy.”     Why is an EGD needed?    An EGD lets your doctor see the lining of your esophagus, stomach and duodenum.  This can be done for several reasons:    It can confirm an abnormal area that was seen on an X-ray.   It can find abnormal areas too small to be seen on an X-ray.   The doctor can take a biopsy (small piece of tissue) of any areas through the endoscope.  If a biopsy is done, the tissue is sent to the lab to be studied.  (Biopsies are done for many reasons and do not always mean cancer.)   Other instruments can be passed through the endoscope without causing pain.  A small brush can be used to wipe cells from an abnormal area. The cells are then sent to the lab to be studied.  A tube or balloon can be used to open up a narrow area.  Bleeding may be controlled by cautery (use of heat) or placement of clips.    Before your EGD    Please plan to have someone take you home.  You cannot drive after your test because the medicine given will make you drowsy.  You may not feel tired, but your judgment and reflexes may be slower than normal.       Do not take aspirin, aspirin products or arthritis medicines on the day of your test.  If you take a blood thinner or  diabetes medicine, ask your doctor whether you should take these.  Tell your doctor and nurse if you are allergic to any drugs before coming for the test.    During your EGD    The back of your throat will be numbed by medication that may be sprayed or gargled.  The doctor will give you medicine to relax you before the endoscope is passed.  While you are in a comfortable position, the endoscope will be passed through your mouth.  Each part of the esophagus, stomach and duodenum will be studied.  This usually takes less than 10 minutes.  You should feel little or no discomfort.  Most patients are asleep.  The tube will not interfere with your breathing.  The medicine should prevent you from gagging.    Going home    You should not drive or operate machinery for 24 hours.  Even if you feel alert, your judgement and reflexes may be slower from the sedative medicine.  Do not make legally binding decisions for the next 24 hours.  Do not drink alcohol for the next 24 hours.  Your throat may be a little sore.  Using lozenges or gargling with warm salt water can help the discomfort.  You may eat and drink as usual unless your doctor tells you to change your diet.  Call your doctor if you have chest pain, trouble swallowing, fever over 101°F, stomach pain, trouble breathing, you vomit blood or coffeeground-like material, or your bowel movement is black or bloody.                                                                  INSURANCE COVERAGE REGARDING PAYMENT  FOR YOUR COLONOSCOPY    **To obtain a pre-service estimate of your procedure - call [phone] to reach the **    Colon Cancer is the second leading cause of death among cancers, per the American Cancer Society.  It is preventable.  Early detection is the key.  Your doctor will determine which tests need to be done for prevention and/or treatment.    If during the course of a screening colonoscopy, our physician finds an abnormality, performs  a biopsy or polypectomy (removal of polyp), your insurance company most likely will consider the procedure to be a diagnostic exam and no longer a screening procedure.    Every insurance company is different.  We encourage you to call your insurance company and ask them \"if during the course of a screening colonoscopy, an abnormality is discovered and the physician performs a biopsy or polypectomy, will the procedure fall under your screening benefits or under diagnostic benefits\".  Generally, screening benefits and diagnostic benefits are paid at different levels.  This varies with each insurance company, so we want you to be aware of this prior to your procedure.  You do not have to call your insurance company if you have Medicare.    The authorization staff at [facility] will precertify your colonoscopy.  However, precertification, which serves as a notification is never a guarantee of payment.  If you have questions regarding precertification for your procedure please contact your insurance company.                                             Dr. Flako Sparks    10/11/2023    Marlena Sosa  [address] [facility] [address]                    Dear Ms. Sosa    Please review the enclosed information.    Thank you.

## 2025-01-29 ENCOUNTER — TELEPHONE (OUTPATIENT)
Dept: FAMILY MEDICINE CLINIC | Age: 85
End: 2025-01-29

## 2025-01-29 NOTE — TELEPHONE ENCOUNTER
Medication Management Service ([facility])  [facility]  [phone]    01/29/25 11:56 AM    CLINICAL PHARMACY NOTE:    PharmD contacted [facility] to check the status of a Tresiba prescription covered by a [employer] voucher. [facility] confirmed that the medication had been ordered and was expected to be filled today (1/29/2025).    Following this, PharmD called the patient to provide an update. The patient stated that they had already picked up the prescription from [employer] and that it was dispensed as a generic equivalent of Tresiba.    Beha Awwad, BSPS  PharmD Candidate of 2025  [facility] Medication Management Service  [phone]  01/29/25

## 2025-02-13 DIAGNOSIS — F41.9 ANXIETY: ICD-10-CM

## 2025-02-13 DIAGNOSIS — Z79.4 TYPE 2 DIABETES MELLITUS WITH DIABETIC POLYNEUROPATHY, WITH LONG-TERM CURRENT USE OF INSULIN (HCC): Primary | ICD-10-CM

## 2025-02-13 DIAGNOSIS — E11.42 TYPE 2 DIABETES MELLITUS WITH DIABETIC POLYNEUROPATHY, WITH LONG-TERM CURRENT USE OF INSULIN (HCC): Primary | ICD-10-CM

## 2025-02-14 NOTE — TELEPHONE ENCOUNTER
Safia Sampson is calling to request a refill on the following medication(s):    Medication Request:  Requested Prescriptions     Pending Prescriptions Disp Refills    Insulin Pen Needle (NOVOFINE PEN NEEDLE) 32G X 6 MM MISC [Pharmacy Med Name: NOVOFINE PEN NDL 32G 1/4IN (6MM)] 300 each 0    sertraline (ZOLOFT) 25 MG tablet [Pharmacy Med Name: SERTRALINE  25MG TAB] 90 tablet 0       Last Visit Date (If Applicable):  1/24/2025    Next Visit Date:    2/26/2025

## 2025-02-15 RX ORDER — SERTRALINE HYDROCHLORIDE 25 MG/1
25 TABLET, FILM COATED ORAL DAILY
Qty: 90 TABLET | Refills: 0 | Status: SHIPPED | OUTPATIENT
Start: 2025-02-15 | End: 2025-05-16

## 2025-02-15 RX ORDER — PEN NEEDLE, DIABETIC 32 GX 1/4"
1 NEEDLE, DISPOSABLE MISCELLANEOUS 3 TIMES DAILY
Qty: 300 EACH | Refills: 0 | Status: SHIPPED | OUTPATIENT
Start: 2025-02-15

## 2025-02-19 LAB
BASOPHILS ABSOLUTE: 0.04 /ΜL
BASOPHILS RELATIVE PERCENT: 0.7 %
CHOLESTEROL, TOTAL: 166 MG/DL
CHOLESTEROL/HDL RATIO: 1.6
EOSINOPHILS ABSOLUTE: 0.07 /ΜL
EOSINOPHILS RELATIVE PERCENT: 1.2 %
ESTIMATED AVERAGE GLUCOSE: 223
FOLATE: >20
HBA1C MFR BLD: 9.4 %
HCT VFR BLD CALC: 42.6 % (ref 36–46)
HDLC SERPL-MCNC: 106 MG/DL (ref 35–70)
HEMOGLOBIN: 13.9 G/DL (ref 12–16)
LDL CHOLESTEROL: 47
LYMPHOCYTES ABSOLUTE: 2.09 /ΜL
LYMPHOCYTES RELATIVE PERCENT: 34.7 %
MCH RBC QN AUTO: 30.8 PG
MCHC RBC AUTO-ENTMCNC: 32.6 G/DL
MCV RBC AUTO: 94 FL
MONOCYTES ABSOLUTE: 0.53 /ΜL
MONOCYTES RELATIVE PERCENT: 8.8 %
NEUTROPHILS ABSOLUTE: 3.27 /ΜL
NEUTROPHILS RELATIVE PERCENT: 54.3 %
NONHDLC SERPL-MCNC: ABNORMAL MG/DL
PDW BLD-RTO: 12.7 %
PLATELET # BLD: 229 K/ΜL
PMV BLD AUTO: NORMAL FL
RBC # BLD: 4.52 10^6/ΜL
TRIGL SERPL-MCNC: 63 MG/DL
TSH SERPL DL<=0.05 MIU/L-ACNC: 2.34 UIU/ML
VITAMIN B-12: 502
VITAMIN D 25-HYDROXY: 43.2
VITAMIN D2, 25 HYDROXY: NORMAL
VITAMIN D3,25 HYDROXY: NORMAL
VLDLC SERPL CALC-MCNC: 13 MG/DL
WBC # BLD: 6 10^3/ML

## 2025-02-24 DIAGNOSIS — Z79.4 TYPE 2 DIABETES MELLITUS WITH DIABETIC POLYNEUROPATHY, WITH LONG-TERM CURRENT USE OF INSULIN (HCC): ICD-10-CM

## 2025-02-24 DIAGNOSIS — E11.42 TYPE 2 DIABETES MELLITUS WITH DIABETIC POLYNEUROPATHY, WITH LONG-TERM CURRENT USE OF INSULIN (HCC): ICD-10-CM

## 2025-02-24 DIAGNOSIS — Z13.29 SCREENING FOR THYROID DISORDER: ICD-10-CM

## 2025-02-24 DIAGNOSIS — Z13.220 SCREENING FOR LIPID DISORDERS: ICD-10-CM

## 2025-02-24 DIAGNOSIS — M81.0 AGE-RELATED OSTEOPOROSIS WITHOUT CURRENT PATHOLOGICAL FRACTURE: ICD-10-CM

## 2025-02-24 DIAGNOSIS — Z13.1 SCREENING FOR DIABETES MELLITUS: ICD-10-CM

## 2025-02-24 DIAGNOSIS — Z13.0 SCREENING FOR DEFICIENCY ANEMIA: ICD-10-CM

## 2025-02-24 DIAGNOSIS — Z13.21 ENCOUNTER FOR VITAMIN DEFICIENCY SCREENING: ICD-10-CM

## 2025-02-24 DIAGNOSIS — M80.00XS AGE-RELATED OSTEOPOROSIS WITH CURRENT PATHOLOGICAL FRACTURE, SEQUELA: ICD-10-CM

## 2025-02-26 ENCOUNTER — OFFICE VISIT (OUTPATIENT)
Dept: FAMILY MEDICINE CLINIC | Age: 85
End: 2025-02-26
Payer: MEDICARE

## 2025-02-26 VITALS
HEART RATE: 83 BPM | WEIGHT: 102.6 LBS | HEIGHT: 61 IN | OXYGEN SATURATION: 97 % | BODY MASS INDEX: 19.37 KG/M2 | DIASTOLIC BLOOD PRESSURE: 58 MMHG | SYSTOLIC BLOOD PRESSURE: 110 MMHG | TEMPERATURE: 97.6 F

## 2025-02-26 DIAGNOSIS — M81.0 AGE RELATED OSTEOPOROSIS, UNSPECIFIED PATHOLOGICAL FRACTURE PRESENCE: ICD-10-CM

## 2025-02-26 DIAGNOSIS — E11.42 TYPE 2 DIABETES MELLITUS WITH DIABETIC POLYNEUROPATHY, WITH LONG-TERM CURRENT USE OF INSULIN (HCC): Primary | ICD-10-CM

## 2025-02-26 DIAGNOSIS — Z79.4 TYPE 2 DIABETES MELLITUS WITH DIABETIC POLYNEUROPATHY, WITH LONG-TERM CURRENT USE OF INSULIN (HCC): Primary | ICD-10-CM

## 2025-02-26 PROCEDURE — 1090F PRES/ABSN URINE INCON ASSESS: CPT | Performed by: STUDENT IN AN ORGANIZED HEALTH CARE EDUCATION/TRAINING PROGRAM

## 2025-02-26 PROCEDURE — 1123F ACP DISCUSS/DSCN MKR DOCD: CPT | Performed by: STUDENT IN AN ORGANIZED HEALTH CARE EDUCATION/TRAINING PROGRAM

## 2025-02-26 PROCEDURE — 3078F DIAST BP <80 MM HG: CPT | Performed by: STUDENT IN AN ORGANIZED HEALTH CARE EDUCATION/TRAINING PROGRAM

## 2025-02-26 PROCEDURE — 1159F MED LIST DOCD IN RCRD: CPT | Performed by: STUDENT IN AN ORGANIZED HEALTH CARE EDUCATION/TRAINING PROGRAM

## 2025-02-26 PROCEDURE — G8427 DOCREV CUR MEDS BY ELIG CLIN: HCPCS | Performed by: STUDENT IN AN ORGANIZED HEALTH CARE EDUCATION/TRAINING PROGRAM

## 2025-02-26 PROCEDURE — 1036F TOBACCO NON-USER: CPT | Performed by: STUDENT IN AN ORGANIZED HEALTH CARE EDUCATION/TRAINING PROGRAM

## 2025-02-26 PROCEDURE — G8399 PT W/DXA RESULTS DOCUMENT: HCPCS | Performed by: STUDENT IN AN ORGANIZED HEALTH CARE EDUCATION/TRAINING PROGRAM

## 2025-02-26 PROCEDURE — 99214 OFFICE O/P EST MOD 30 MIN: CPT | Performed by: STUDENT IN AN ORGANIZED HEALTH CARE EDUCATION/TRAINING PROGRAM

## 2025-02-26 PROCEDURE — 3074F SYST BP LT 130 MM HG: CPT | Performed by: STUDENT IN AN ORGANIZED HEALTH CARE EDUCATION/TRAINING PROGRAM

## 2025-02-26 PROCEDURE — G8420 CALC BMI NORM PARAMETERS: HCPCS | Performed by: STUDENT IN AN ORGANIZED HEALTH CARE EDUCATION/TRAINING PROGRAM

## 2025-02-26 PROCEDURE — 3046F HEMOGLOBIN A1C LEVEL >9.0%: CPT | Performed by: STUDENT IN AN ORGANIZED HEALTH CARE EDUCATION/TRAINING PROGRAM

## 2025-02-26 NOTE — PROGRESS NOTES
[facility]  [facility]  [address]  [place] [address]     Date of Visit:  2025  Patient Name: Safia Sampson   Patient :  1940     CHIEF COMPLAINT:     Safia Sampson is a 84 y.o. female who presents today for an general visit to be evaluated for the following condition(s):  Chief Complaint   Patient presents with    Diabetes       REVIEW OF SYSTEM      Review of Systems    HISTORY OF PRESENT ILLNESS     History of Present Illness  The patient presents for evaluation of diabetes mellitus. She is accompanied by her daughter.    She has been monitoring her blood glucose levels diligently, recording them before and after meals. She typically consumes 2 meals per day, with occasional snacks such as strawberries, cottage cheese, or peaches. Her eating schedule varies, with dinner sometimes consumed as late as 7:00 PM. She checks her blood glucose levels approximately 4 to 5 hours post-meal. Since the increase in her insulin dosage, she has experienced lower morning readings, although she reports no associated symptoms. She recalls a reading of 150 on 2025, but does not remember the circumstances. She admits to occasionally consuming alcohol, approximately every 2 to 3 nights, but has been making efforts to reduce this habit. She has been without her osteoporosis injection for nearly 6 months. She has not received her Tresiba insulin due to shipment delays, but was provided with a generic version as a temporary measure. She continues to consume applesauce, pretzels, air-puffed popcorn, and Honey Nut Cheerios, despite being aware of their potential impact on her blood glucose levels. She expresses a desire to improve her dietary habits and reduce her alcohol intake. She declines the offer to consult with a nutritionist, stating that she is aware of the necessary dietary modifications. She reports a constant feeling of hunger and expresses concern about her inability to

## 2025-03-04 ENCOUNTER — TELEPHONE (OUTPATIENT)
Dept: FAMILY MEDICINE CLINIC | Age: 85
End: 2025-03-04

## 2025-03-04 DIAGNOSIS — M80.00XS AGE-RELATED OSTEOPOROSIS WITH CURRENT PATHOLOGICAL FRACTURE, SEQUELA: ICD-10-CM

## 2025-03-04 RX ORDER — DENOSUMAB 60 MG/ML
INJECTION SUBCUTANEOUS
Qty: 1 ML | Refills: 11 | Status: SHIPPED | OUTPATIENT
Start: 2025-03-04 | End: 2025-03-06

## 2025-03-06 DIAGNOSIS — M80.00XS AGE-RELATED OSTEOPOROSIS WITH CURRENT PATHOLOGICAL FRACTURE, SEQUELA: Primary | ICD-10-CM

## 2025-03-06 RX ORDER — DENOSUMAB 60 MG/ML
60 INJECTION SUBCUTANEOUS ONCE
Qty: 180 ML | Refills: 1 | Status: SHIPPED | OUTPATIENT
Start: 2025-03-06 | End: 2025-03-06

## 2025-03-07 ENCOUNTER — TELEPHONE (OUTPATIENT)
Dept: INFUSION THERAPY | Facility: MEDICAL CENTER | Age: 85
End: 2025-03-07

## 2025-03-07 ENCOUNTER — TELEPHONE (OUTPATIENT)
Dept: ONCOLOGY | Age: 85
End: 2025-03-07

## 2025-03-07 NOTE — TELEPHONE ENCOUNTER
I TRIED TO CALL AISHWARYA TO SCHEDULE HER PROLIA INJECTON & LAB WORK AND HAD TO LEAVE A MESSAGE TO CALL THE OFFICE TO SCHEDULE.

## 2025-03-07 NOTE — TELEPHONE ENCOUNTER
New Order 3/4/25  Maria C Anand MD   Prolia 60 mg SQ for 1 dose  Labs: Calcium & Creatinine prior to dose  Order noted & chart to front office for processing

## 2025-03-20 ENCOUNTER — OFFICE VISIT (OUTPATIENT)
Dept: FAMILY MEDICINE CLINIC | Age: 85
End: 2025-03-20
Payer: MEDICARE

## 2025-03-20 VITALS
TEMPERATURE: 97.4 F | OXYGEN SATURATION: 94 % | BODY MASS INDEX: 19.51 KG/M2 | SYSTOLIC BLOOD PRESSURE: 120 MMHG | WEIGHT: 102.4 LBS | HEART RATE: 78 BPM | DIASTOLIC BLOOD PRESSURE: 58 MMHG

## 2025-03-20 DIAGNOSIS — M54.50 ACUTE RIGHT-SIDED LOW BACK PAIN WITHOUT SCIATICA: Primary | ICD-10-CM

## 2025-03-20 DIAGNOSIS — M81.0 AGE RELATED OSTEOPOROSIS, UNSPECIFIED PATHOLOGICAL FRACTURE PRESENCE: ICD-10-CM

## 2025-03-20 LAB
BILIRUBIN, POC: NORMAL
BLOOD URINE, POC: NORMAL
CLARITY, POC: CLEAR
COLOR, POC: YELLOW
GLUCOSE URINE, POC: >1000 MG/DL
KETONES, POC: NORMAL MG/DL
LEUKOCYTE EST, POC: NORMAL
NITRITE, POC: NORMAL
PH, POC: 6.5
PROTEIN, POC: NORMAL MG/DL
SPECIFIC GRAVITY, POC: 1.01
UROBILINOGEN, POC: 0.2 MG/DL

## 2025-03-20 PROCEDURE — 1160F RVW MEDS BY RX/DR IN RCRD: CPT | Performed by: NURSE PRACTITIONER

## 2025-03-20 PROCEDURE — 99214 OFFICE O/P EST MOD 30 MIN: CPT | Performed by: NURSE PRACTITIONER

## 2025-03-20 PROCEDURE — 81003 URINALYSIS AUTO W/O SCOPE: CPT | Performed by: NURSE PRACTITIONER

## 2025-03-20 PROCEDURE — 1090F PRES/ABSN URINE INCON ASSESS: CPT | Performed by: NURSE PRACTITIONER

## 2025-03-20 PROCEDURE — 1123F ACP DISCUSS/DSCN MKR DOCD: CPT | Performed by: NURSE PRACTITIONER

## 2025-03-20 PROCEDURE — G8399 PT W/DXA RESULTS DOCUMENT: HCPCS | Performed by: NURSE PRACTITIONER

## 2025-03-20 PROCEDURE — G8420 CALC BMI NORM PARAMETERS: HCPCS | Performed by: NURSE PRACTITIONER

## 2025-03-20 PROCEDURE — 1159F MED LIST DOCD IN RCRD: CPT | Performed by: NURSE PRACTITIONER

## 2025-03-20 PROCEDURE — G8427 DOCREV CUR MEDS BY ELIG CLIN: HCPCS | Performed by: NURSE PRACTITIONER

## 2025-03-20 PROCEDURE — 3074F SYST BP LT 130 MM HG: CPT | Performed by: NURSE PRACTITIONER

## 2025-03-20 PROCEDURE — 3078F DIAST BP <80 MM HG: CPT | Performed by: NURSE PRACTITIONER

## 2025-03-20 PROCEDURE — 1036F TOBACCO NON-USER: CPT | Performed by: NURSE PRACTITIONER

## 2025-03-20 RX ORDER — LIDOCAINE 50 MG/G
1 PATCH TOPICAL DAILY
Qty: 30 PATCH | Refills: 0 | Status: SHIPPED | OUTPATIENT
Start: 2025-03-20 | End: 2025-04-19

## 2025-03-20 ASSESSMENT — ENCOUNTER SYMPTOMS: BACK PAIN: 1

## 2025-03-20 NOTE — PROGRESS NOTES
Opal Barreto, [facility]  [address]  [place]  [address]  [phone]  [phone]    Safia Sampson is a 84 y.o. female who is here with c/o of:    Chief Complaint: Other (Rt Side pain x 2 weeks no known injury per pt)      Patient Accompanied by: patient and daughter    HPI - Safia Sampson is here today with c/o:    History of Present Illness  The patient is an 84-year-old female who presents with complaints of right-sided pain.    She has been experiencing this discomfort for approximately 2 weeks, which she describes as a sharp pain that intensifies with certain movements. She reports no recent falls or injuries. She also reports no urinary symptoms such as increased frequency or urgency, and no presence of blood in her urine. Additionally, she has not experienced any systemic symptoms such as fevers or chills, and no gastrointestinal symptoms such as nausea or vomiting. Her bowel movements remain regular. She frequently bends over to reach her books, which are located on the right side of her chair. She also does a lot of bending over when she tucks her sheets under the mattress.    Supplemental Information  She has a known diagnosis of osteoporosis.       Health Maintenance Due   Topic Date Due    Diabetic Alb to Cr ratio (uACR) test  Never done    Pneumococcal 50+ years Vaccine (2 of 2 - PPSV23) 12/22/2015    Respiratory Syncytial Virus (RSV) Pregnant or age 60 yrs+ (1 - 1-dose 75+ series) Never done    Flu vaccine (1) 08/01/2024    COVID-19 Vaccine (1 - 2024-25 season) Never done    Annual Wellness Visit (Medicare Advantage)  01/01/2025    GFR test (Diabetes, CKD 3-4, OR last GFR 15-59)  04/17/2025        Patient Active Problem List:     COPD (chronic obstructive pulmonary disease) (HCC)     Hypertension     Dyslipidemia     Osteoporosis     Osteoarthritis     Type 2 diabetes mellitus with diabetic polyneuropathy (HCC)     Duodenal adenoma     Diabetic polyneuropathy

## 2025-03-25 ENCOUNTER — HOSPITAL ENCOUNTER (OUTPATIENT)
Dept: GENERAL RADIOLOGY | Age: 85
Discharge: HOME | End: 2025-03-27
Payer: MEDICARE

## 2025-03-25 ENCOUNTER — HOSPITAL ENCOUNTER (OUTPATIENT)
Age: 85
Discharge: HOME | End: 2025-03-27
Payer: MEDICARE

## 2025-03-25 DIAGNOSIS — M54.50 ACUTE RIGHT-SIDED LOW BACK PAIN WITHOUT SCIATICA: ICD-10-CM

## 2025-03-25 PROCEDURE — 72100 X-RAY EXAM L-S SPINE 2/3 VWS: CPT

## 2025-03-31 ENCOUNTER — RESULTS FOLLOW-UP (OUTPATIENT)
Dept: FAMILY MEDICINE CLINIC | Age: 85
End: 2025-03-31

## 2025-03-31 DIAGNOSIS — M43.9 COMPRESSION DEFORMITY OF VERTEBRA: Primary | ICD-10-CM

## 2025-03-31 DIAGNOSIS — M54.50 ACUTE RIGHT-SIDED LOW BACK PAIN WITHOUT SCIATICA: ICD-10-CM

## 2025-04-01 ENCOUNTER — HOSPITAL ENCOUNTER (OUTPATIENT)
Facility: MEDICAL CENTER | Age: 85
Discharge: HOME | End: 2025-04-01
Payer: MEDICARE

## 2025-04-01 ENCOUNTER — HOSPITAL ENCOUNTER (OUTPATIENT)
Dept: INFUSION THERAPY | Facility: MEDICAL CENTER | Age: 85
Discharge: HOME | End: 2025-04-01
Payer: MEDICARE

## 2025-04-01 VITALS
TEMPERATURE: 97.9 F | DIASTOLIC BLOOD PRESSURE: 67 MMHG | RESPIRATION RATE: 18 BRPM | HEART RATE: 86 BPM | SYSTOLIC BLOOD PRESSURE: 125 MMHG

## 2025-04-01 DIAGNOSIS — M80.00XS AGE-RELATED OSTEOPOROSIS WITH CURRENT PATHOLOGICAL FRACTURE, SEQUELA: Primary | ICD-10-CM

## 2025-04-01 DIAGNOSIS — M81.0 AGE-RELATED OSTEOPOROSIS WITHOUT CURRENT PATHOLOGICAL FRACTURE: ICD-10-CM

## 2025-04-01 LAB
CALCIUM SERPL-MCNC: 9.7 MG/DL (ref 8.8–10.2)
CREAT SERPL-MCNC: 0.5 MG/DL (ref 0.5–0.9)
GFR, ESTIMATED: >90 ML/MIN/1.73M2

## 2025-04-01 PROCEDURE — 82565 ASSAY OF CREATININE: CPT

## 2025-04-01 PROCEDURE — 6360000002 HC RX W HCPCS: Performed by: STUDENT IN AN ORGANIZED HEALTH CARE EDUCATION/TRAINING PROGRAM

## 2025-04-01 PROCEDURE — 96372 THER/PROPH/DIAG INJ SC/IM: CPT

## 2025-04-01 PROCEDURE — 36415 COLL VENOUS BLD VENIPUNCTURE: CPT

## 2025-04-01 PROCEDURE — 82310 ASSAY OF CALCIUM: CPT

## 2025-04-01 RX ADMIN — DENOSUMAB 60 MG: 60 INJECTION SUBCUTANEOUS at 15:41

## 2025-04-01 ASSESSMENT — PAIN SCALES - GENERAL: PAINLEVEL_OUTOF10: 5

## 2025-04-01 ASSESSMENT — PAIN DESCRIPTION - LOCATION: LOCATION: BACK

## 2025-04-01 NOTE — PROGRESS NOTES
Patient presents for Prolia injection. VS as charted. Patient denies any current dental or jaw issues. No other concerns at this time. Patient has chronic back pain but no worse than baseline.     Labs and order reviewed. Patient provided education on Prolia and potential side effects, denies any questions at this time. Prolia administered to left arm, patient tolerated well with no adverse reaction, and band-aid applied. Patient discharged ambulatory with calendar in hand.

## 2025-04-14 DIAGNOSIS — E11.42 TYPE 2 DIABETES MELLITUS WITH DIABETIC POLYNEUROPATHY, WITH LONG-TERM CURRENT USE OF INSULIN (HCC): ICD-10-CM

## 2025-04-14 DIAGNOSIS — Z79.4 TYPE 2 DIABETES MELLITUS WITH DIABETIC POLYNEUROPATHY, WITH LONG-TERM CURRENT USE OF INSULIN (HCC): ICD-10-CM

## 2025-04-14 NOTE — TELEPHONE ENCOUNTER
Safia Sampson is calling to request a refill on the following medication(s):    Medication Request:  Requested Prescriptions     Pending Prescriptions Disp Refills    blood glucose test strips (ACCU-CHEK GUIDE) strip [Pharmacy Med Name: ACCU-CHEK GUIDE TEST STRIPS (50)] 300 strip 0    Accu-Chek Softclix Lancets MISC [Pharmacy Med Name: ACCU-CHEK SOFTCLIX LANCETS (100)] 300 each 0     Si each by Does not apply route in the morning, at noon, and at bedtime    Alcohol Swabs (B-D SINGLE USE SWABS REGULAR) PADS [Pharmacy Med Name: BD ALCOHOL SWAB PAD] 300 each 0     Si each by Does not apply route in the morning, at noon, and at bedtime    Blood Glucose Monitoring Suppl (ACCU-CHEK GUIDE ME) w/Device KIT [Pharmacy Med Name: ACCU-CHEK GUIDE-ME KIT] 1 kit 0       Last Visit Date (If Applicable):  2025    Next Visit Date:    2025

## 2025-04-16 RX ORDER — BLOOD-GLUCOSE METER
1 EACH MISCELLANEOUS DAILY
Qty: 1 KIT | Refills: 0 | Status: SHIPPED | OUTPATIENT
Start: 2025-04-16

## 2025-04-16 RX ORDER — ISOPROPYL ALCOHOL 0.75 G/1
1 SWAB TOPICAL 3 TIMES DAILY
Qty: 300 EACH | Refills: 0 | Status: SHIPPED | OUTPATIENT
Start: 2025-04-16

## 2025-04-16 RX ORDER — LANCETS
1 EACH MISCELLANEOUS 3 TIMES DAILY
Qty: 300 EACH | Refills: 0 | Status: SHIPPED | OUTPATIENT
Start: 2025-04-16

## 2025-04-16 RX ORDER — BLOOD SUGAR DIAGNOSTIC
STRIP MISCELLANEOUS
Qty: 300 STRIP | Refills: 0 | Status: SHIPPED | OUTPATIENT
Start: 2025-04-16

## 2025-04-24 ENCOUNTER — OFFICE VISIT (OUTPATIENT)
Dept: FAMILY MEDICINE CLINIC | Age: 85
End: 2025-04-24
Payer: MEDICARE

## 2025-04-24 VITALS
HEART RATE: 81 BPM | BODY MASS INDEX: 18.96 KG/M2 | SYSTOLIC BLOOD PRESSURE: 104 MMHG | OXYGEN SATURATION: 98 % | TEMPERATURE: 97.5 F | DIASTOLIC BLOOD PRESSURE: 60 MMHG | WEIGHT: 100.4 LBS | HEIGHT: 61 IN

## 2025-04-24 DIAGNOSIS — Z00.00 MEDICARE ANNUAL WELLNESS VISIT, SUBSEQUENT: Primary | ICD-10-CM

## 2025-04-24 DIAGNOSIS — E11.42 DIABETIC POLYNEUROPATHY ASSOCIATED WITH TYPE 2 DIABETES MELLITUS (HCC): ICD-10-CM

## 2025-04-24 PROCEDURE — G8420 CALC BMI NORM PARAMETERS: HCPCS | Performed by: STUDENT IN AN ORGANIZED HEALTH CARE EDUCATION/TRAINING PROGRAM

## 2025-04-24 PROCEDURE — G0439 PPPS, SUBSEQ VISIT: HCPCS | Performed by: STUDENT IN AN ORGANIZED HEALTH CARE EDUCATION/TRAINING PROGRAM

## 2025-04-24 PROCEDURE — 3078F DIAST BP <80 MM HG: CPT | Performed by: STUDENT IN AN ORGANIZED HEALTH CARE EDUCATION/TRAINING PROGRAM

## 2025-04-24 PROCEDURE — 3074F SYST BP LT 130 MM HG: CPT | Performed by: STUDENT IN AN ORGANIZED HEALTH CARE EDUCATION/TRAINING PROGRAM

## 2025-04-24 PROCEDURE — G8399 PT W/DXA RESULTS DOCUMENT: HCPCS | Performed by: STUDENT IN AN ORGANIZED HEALTH CARE EDUCATION/TRAINING PROGRAM

## 2025-04-24 PROCEDURE — 1036F TOBACCO NON-USER: CPT | Performed by: STUDENT IN AN ORGANIZED HEALTH CARE EDUCATION/TRAINING PROGRAM

## 2025-04-24 PROCEDURE — G8427 DOCREV CUR MEDS BY ELIG CLIN: HCPCS | Performed by: STUDENT IN AN ORGANIZED HEALTH CARE EDUCATION/TRAINING PROGRAM

## 2025-04-24 PROCEDURE — 1123F ACP DISCUSS/DSCN MKR DOCD: CPT | Performed by: STUDENT IN AN ORGANIZED HEALTH CARE EDUCATION/TRAINING PROGRAM

## 2025-04-24 PROCEDURE — 99213 OFFICE O/P EST LOW 20 MIN: CPT | Performed by: STUDENT IN AN ORGANIZED HEALTH CARE EDUCATION/TRAINING PROGRAM

## 2025-04-24 PROCEDURE — 1090F PRES/ABSN URINE INCON ASSESS: CPT | Performed by: STUDENT IN AN ORGANIZED HEALTH CARE EDUCATION/TRAINING PROGRAM

## 2025-04-24 PROCEDURE — 1159F MED LIST DOCD IN RCRD: CPT | Performed by: STUDENT IN AN ORGANIZED HEALTH CARE EDUCATION/TRAINING PROGRAM

## 2025-04-24 PROCEDURE — 3046F HEMOGLOBIN A1C LEVEL >9.0%: CPT | Performed by: STUDENT IN AN ORGANIZED HEALTH CARE EDUCATION/TRAINING PROGRAM

## 2025-04-24 RX ORDER — GABAPENTIN 300 MG/1
CAPSULE ORAL
Qty: 270 CAPSULE | Refills: 3 | Status: SHIPPED | OUTPATIENT
Start: 2025-04-24 | End: 2025-07-25

## 2025-04-24 ASSESSMENT — PATIENT HEALTH QUESTIONNAIRE - PHQ9
SUM OF ALL RESPONSES TO PHQ QUESTIONS 1-9: 0
1. LITTLE INTEREST OR PLEASURE IN DOING THINGS: NOT AT ALL
2. FEELING DOWN, DEPRESSED OR HOPELESS: NOT AT ALL
SUM OF ALL RESPONSES TO PHQ QUESTIONS 1-9: 0

## 2025-04-24 NOTE — PROGRESS NOTES
Medicare Annual Wellness Visit    Safia HARDY Sampson is here for Medicare AW and Discuss Medications (Gabapentin )    Assessment & Plan  Diabetes:       1. Diabetes mellitus, uncontrolled  - Noted improvement, morning glucose in 100s, postprandial 200-250  - Prefers finger pricking over CGM  - Follow-up in 1 month    2. Numbness and tingling, uncontrolled  - Persistent numbness and tingling in arms and legs despite gabapentin 300 mg TID  - Slight hand contractures bilaterally, strength 5+ in extremities, no leg swelling  - Reduce gabapentin to once daily for a week, then stop; restart if symptoms worsen  - Hold off on omega-3 acids  Medicare annual wellness visit, subsequent  Diabetic polyneuropathy associated with type 2 diabetes mellitus (HCC)  -     gabapentin (NEURONTIN) 300 MG capsule; TAKE 1 CAPSULE  DAILY, Disp-270 capsule, R-3Normal    Results       No follow-ups on file.     Subjective     History of Present Illness  85-year-old female with uncontrolled diabetes, presenting for annual wellness visit. Reduced sweets and alcohol intake. Accompanied by daughter-in-law.    Reports persistent numbness in arms and legs despite gabapentin 300 mg TID, desires to discontinue. Inquires about omega-3 acids benefits.    Patient's complete Health Risk Assessment and screening values have been reviewed and are found in Flowsheets. The following problems were reviewed today and where indicated follow up appointments were made and/or referrals ordered.    Positive Risk Factor Screenings with Interventions:              Inactivity:  On average, how many days per week do you engage in moderate to strenuous exercise (like a brisk walk)?: 1 day (!) Abnormal  On average, how many minutes do you engage in exercise at this level?: 0 min  Interventions:  Advise to get up and walk 10 minutes an hour around the house          ADL's:   Patient reports needing help with:  Select all that apply: (!) Bathing  Select all that apply: (!)

## 2025-04-25 NOTE — PATIENT INSTRUCTIONS
Learning About Being Active as an Older Adult  Why is being active important as you get older?     Being active is one of the best things you can do for your health. And it's never too late to start. Being active--or getting active, if you aren't already--has definite benefits. It can:  Give you more energy,  Keep your mind sharp.  Improve balance to reduce your risk of falls.  Help you manage chronic illness with fewer medicines.  No matter how old you are, how fit you are, or what health problems you have, there is a form of activity that will work for you. And the more physical activity you can do, the better your overall health will be.  What kinds of activity can help you stay healthy?  Being more active will make your daily activities easier. Physical activity includes planned exercise and things you do in daily life. There are four types of activity:  Aerobic.  Doing aerobic activity makes your heart and lungs strong.  Includes walking, dancing, and gardening.  Aim for at least 2½ hours spread throughout the week.  It improves your energy and can help you sleep better.  Muscle-strengthening.  This type of activity can help maintain muscle and strengthen bones.  Includes climbing stairs, using resistance bands, and lifting or carrying heavy loads.  Aim for at least twice a week.  It can help protect the knees and other joints.  Stretching.  Stretching gives you better range of motion in joints and muscles.  Includes upper arm stretches, calf stretches, and gentle yoga.  Aim for at least twice a week, preferably after your muscles are warmed up from other activities.  It can help you function better in daily life.  Balancing.  This helps you stay coordinated and have good posture.  Includes heel-to-toe walking, lidia chi, and certain types of yoga.  Aim for at least 3 days a week.  It can reduce your risk of falling.  Even if you have a hard time meeting the recommendations, it's better to be more active

## 2025-05-01 DIAGNOSIS — F41.9 ANXIETY: ICD-10-CM

## 2025-05-01 RX ORDER — SERTRALINE HYDROCHLORIDE 25 MG/1
25 TABLET, FILM COATED ORAL DAILY
Qty: 90 TABLET | Refills: 3 | Status: SHIPPED | OUTPATIENT
Start: 2025-05-01

## 2025-05-01 NOTE — TELEPHONE ENCOUNTER
Safia Sampson is calling to request a refill on the following medication(s):    Medication Request:  Requested Prescriptions     Pending Prescriptions Disp Refills    sertraline (ZOLOFT) 25 MG tablet [Pharmacy Med Name: Sertraline HCl Oral Tablet 25 MG] 90 tablet 3     Sig: TAKE 1 TABLET EVERY DAY       Last Visit Date (If Applicable):  4/24/2025    Next Visit Date:    6/5/2025          Last refilled 2/15/2025 Rx Pending

## 2025-05-05 DIAGNOSIS — E11.42 TYPE 2 DIABETES MELLITUS WITH DIABETIC POLYNEUROPATHY, WITH LONG-TERM CURRENT USE OF INSULIN (HCC): ICD-10-CM

## 2025-05-05 DIAGNOSIS — Z79.4 TYPE 2 DIABETES MELLITUS WITH DIABETIC POLYNEUROPATHY, WITH LONG-TERM CURRENT USE OF INSULIN (HCC): ICD-10-CM

## 2025-05-06 RX ORDER — PEN NEEDLE, DIABETIC 32GX 5/32"
NEEDLE, DISPOSABLE MISCELLANEOUS
Qty: 300 EACH | Refills: 3 | Status: SHIPPED | OUTPATIENT
Start: 2025-05-06

## 2025-05-06 NOTE — TELEPHONE ENCOUNTER
Safia Sampson is calling to request a refill on the following medication(s):    Medication Request:  Requested Prescriptions     Pending Prescriptions Disp Refills    DROPLET PEN NEEDLES 32G X 6 MM MISC [Pharmacy Med Name: Droplet Pen Needles Miscellaneous 32G X 6 MM] 300 each 3     Sig: USE TO INJECT UNDER THE SKIN THREE TIMES DAILY       Last Visit Date (If Applicable):  4/24/2025    Next Visit Date:    6/5/2025

## 2025-06-05 ENCOUNTER — OFFICE VISIT (OUTPATIENT)
Dept: FAMILY MEDICINE CLINIC | Age: 85
End: 2025-06-05
Payer: MEDICARE

## 2025-06-05 VITALS
OXYGEN SATURATION: 96 % | HEART RATE: 85 BPM | WEIGHT: 101 LBS | TEMPERATURE: 97.2 F | DIASTOLIC BLOOD PRESSURE: 54 MMHG | SYSTOLIC BLOOD PRESSURE: 108 MMHG | BODY MASS INDEX: 19.24 KG/M2

## 2025-06-05 DIAGNOSIS — E11.42 TYPE 2 DIABETES MELLITUS WITH DIABETIC POLYNEUROPATHY, WITH LONG-TERM CURRENT USE OF INSULIN (HCC): Primary | ICD-10-CM

## 2025-06-05 DIAGNOSIS — Z79.4 TYPE 2 DIABETES MELLITUS WITH DIABETIC POLYNEUROPATHY, WITH LONG-TERM CURRENT USE OF INSULIN (HCC): Primary | ICD-10-CM

## 2025-06-05 LAB — HBA1C MFR BLD: 9 %

## 2025-06-05 PROCEDURE — 1123F ACP DISCUSS/DSCN MKR DOCD: CPT | Performed by: STUDENT IN AN ORGANIZED HEALTH CARE EDUCATION/TRAINING PROGRAM

## 2025-06-05 PROCEDURE — 3052F HG A1C>EQUAL 8.0%<EQUAL 9.0%: CPT | Performed by: STUDENT IN AN ORGANIZED HEALTH CARE EDUCATION/TRAINING PROGRAM

## 2025-06-05 PROCEDURE — 83036 HEMOGLOBIN GLYCOSYLATED A1C: CPT | Performed by: STUDENT IN AN ORGANIZED HEALTH CARE EDUCATION/TRAINING PROGRAM

## 2025-06-05 PROCEDURE — G8427 DOCREV CUR MEDS BY ELIG CLIN: HCPCS | Performed by: STUDENT IN AN ORGANIZED HEALTH CARE EDUCATION/TRAINING PROGRAM

## 2025-06-05 PROCEDURE — G8420 CALC BMI NORM PARAMETERS: HCPCS | Performed by: STUDENT IN AN ORGANIZED HEALTH CARE EDUCATION/TRAINING PROGRAM

## 2025-06-05 PROCEDURE — 1159F MED LIST DOCD IN RCRD: CPT | Performed by: STUDENT IN AN ORGANIZED HEALTH CARE EDUCATION/TRAINING PROGRAM

## 2025-06-05 PROCEDURE — 99213 OFFICE O/P EST LOW 20 MIN: CPT | Performed by: STUDENT IN AN ORGANIZED HEALTH CARE EDUCATION/TRAINING PROGRAM

## 2025-06-05 PROCEDURE — 3078F DIAST BP <80 MM HG: CPT | Performed by: STUDENT IN AN ORGANIZED HEALTH CARE EDUCATION/TRAINING PROGRAM

## 2025-06-05 PROCEDURE — G8399 PT W/DXA RESULTS DOCUMENT: HCPCS | Performed by: STUDENT IN AN ORGANIZED HEALTH CARE EDUCATION/TRAINING PROGRAM

## 2025-06-05 PROCEDURE — 3074F SYST BP LT 130 MM HG: CPT | Performed by: STUDENT IN AN ORGANIZED HEALTH CARE EDUCATION/TRAINING PROGRAM

## 2025-06-05 PROCEDURE — 1036F TOBACCO NON-USER: CPT | Performed by: STUDENT IN AN ORGANIZED HEALTH CARE EDUCATION/TRAINING PROGRAM

## 2025-06-05 PROCEDURE — 1090F PRES/ABSN URINE INCON ASSESS: CPT | Performed by: STUDENT IN AN ORGANIZED HEALTH CARE EDUCATION/TRAINING PROGRAM

## 2025-06-06 NOTE — PROGRESS NOTES
[facility]  [facility]  [address] [facility] [address]     Date of Visit:  2025  Patient Name: Safia Sampson   Patient :  1940     CHIEF COMPLAINT:     Safia Sampson is a 85 y.o. female who presents today for an general visit to be evaluated for the following condition(s):  Chief Complaint   Patient presents with    Diabetes       REVIEW OF SYSTEM      Review of Systems    HISTORY OF PRESENT ILLNESS     History of Present Illness  The patient presents for evaluation of diabetes.    She is working to improve her A1c levels, reducing alcohol consumption to 1-2 beers and a few shots every 5 days. Her diet includes various foods such as Chinese food, chicken, broccoli, brown rice, pasta, potatoes, bread, spaghetti, mac and cheese, mashed potatoes, bananas, cereal, blueberries, strawberries, cheese, salami, Ritz crackers, and honey nut Cheerios. Occasionally consumes watermelon and honey in oatmeal. No orange juice. Experiences hunger pangs and concerns about nocturnal hypoglycemia, has glucose tablets available. Currently on Tresiba 16 units, Farxiga in the morning, and sertraline in the afternoon. Discontinued gabapentin without changes. Previously tried Ozempic but stopped due to excessive weight loss.    Takes atorvastatin.    PAST SURGICAL HISTORY:  Partial hysterectomy 2009    SOCIAL HISTORY  Drinks alcohol occasionally, typically 1-2 beers and a couple of shots, spaced out over at least 5 days.      REVIEWED INFORMATION      Allergies   Allergen Reactions    Nubain [Nalbuphine Hcl]     Hydroxyzine Other (See Comments), Nausea Only and Nausea And Vomiting    Vistaril [Hydroxyzine Hcl] Nausea And Vomiting       Patient Active Problem List   Diagnosis    COPD (chronic obstructive pulmonary disease) (HCC)    Hypertension    Dyslipidemia    Osteoporosis    Osteoarthritis    Type 2 diabetes mellitus with diabetic polyneuropathy (HCC)    Duodenal adenoma    Diabetic

## 2025-06-29 DIAGNOSIS — Z79.4 TYPE 2 DIABETES MELLITUS WITH DIABETIC POLYNEUROPATHY, WITH LONG-TERM CURRENT USE OF INSULIN (HCC): ICD-10-CM

## 2025-06-29 DIAGNOSIS — E11.42 TYPE 2 DIABETES MELLITUS WITH DIABETIC POLYNEUROPATHY, WITH LONG-TERM CURRENT USE OF INSULIN (HCC): ICD-10-CM

## 2025-06-30 RX ORDER — ISOPROPYL ALCOHOL 70 ML/100ML
SWAB TOPICAL
Qty: 50 EACH | Refills: 3 | Status: SHIPPED | OUTPATIENT
Start: 2025-06-30

## 2025-06-30 NOTE — TELEPHONE ENCOUNTER
Safia Sampson is calling to request a refill on the following medication(s):    Medication Request:  Requested Prescriptions     Pending Prescriptions Disp Refills    Alcohol Swabs (DROPSAFE ALCOHOL PREP) 70 % PADS [Pharmacy Med Name: DropSafe Alcohol Prep Pad 70 %] 50 each 3     Sig: USE IN THE MORNING, AT NOON, AND AT BEDTIME       Last Visit Date (If Applicable):  6/5/2025    Next Visit Date:    9/5/2025

## 2025-07-29 RX ORDER — LANCETS
EACH MISCELLANEOUS
Qty: 300 EACH | Refills: 3 | Status: SHIPPED | OUTPATIENT
Start: 2025-07-29

## 2025-07-29 NOTE — TELEPHONE ENCOUNTER
Safia Sampson is calling to request a refill on the following medication(s):    Medication Request:  Requested Prescriptions     Pending Prescriptions Disp Refills    Accu-Chek Softclix Lancets MISC [Pharmacy Med Name: ACCU-CHEK SOFTCLIX LANCETS] 300 each 3     Sig: TEST IN THE MORNING, AT NOON, AND AT BEDTIME       Last Visit Date (If Applicable):  6/5/2025    Next Visit Date:    9/5/2025

## 2025-09-05 ENCOUNTER — HOSPITAL ENCOUNTER (OUTPATIENT)
Age: 85
Setting detail: SPECIMEN
Discharge: HOME | End: 2025-09-05

## 2025-09-05 DIAGNOSIS — Z79.4 TYPE 2 DIABETES MELLITUS WITH DIABETIC POLYNEUROPATHY, WITH LONG-TERM CURRENT USE OF INSULIN (HCC): ICD-10-CM

## 2025-09-05 DIAGNOSIS — E11.42 TYPE 2 DIABETES MELLITUS WITH DIABETIC POLYNEUROPATHY, WITH LONG-TERM CURRENT USE OF INSULIN (HCC): ICD-10-CM

## 2025-09-05 LAB
CREAT UR-MCNC: 18.8 MG/DL (ref 28–217)
MICROALBUMIN UR-MCNC: <12 MG/L (ref 0–20)
MICROALBUMIN/CREAT UR-RTO: ABNORMAL MCG/MG CREAT (ref 0–25)

## (undated) DEVICE — CO2 CANNULA,SUPERSOFT, ADLT,7'O2,7'CO2: Brand: MEDLINE

## (undated) DEVICE — SPONGE GZ W4XL4IN COT 4 PLY WVN

## (undated) DEVICE — FORCEPS REPROCESS BIOP RADL JAW 4 W/NDL

## (undated) DEVICE — BLOCK BITE ENDOSCP AD 21 MM W/ DIL BLU LF DISP

## (undated) DEVICE — MEDI-VAC NON-CONDUCTIVE SUCTION TUBING 7MM X 6.1M (20 FT.) L: Brand: CARDINAL HEALTH

## (undated) DEVICE — EMESIS BASIN: Brand: DEROYAL

## (undated) DEVICE — FORCEPS BX L240CM WRK CHN 2.8MM STD CAP W/ NDL MIC MESH

## (undated) DEVICE — TUBING, SUCTION, 1/4" X 12', STRAIGHT: Brand: MEDLINE

## (undated) DEVICE — Z DISCONTINUED USE 2624852 GLOVE SURG 7 PF TEXT NEOPRNE BRN STRL NEOLON 2G LF

## (undated) DEVICE — Device: Brand: DEFENDO VALVE AND CONNECTOR KIT

## (undated) DEVICE — MEDICINE CUP, GRADUATED, STER: Brand: MEDLINE

## (undated) DEVICE — SOLUTION IV IRRIG WATER 1000ML POUR BRL 2F7114

## (undated) DEVICE — BLOCK BITE 60FR RUBBER ADLT DENTAL

## (undated) DEVICE — 4-PORT MANIFOLD: Brand: NEPTUNE 2